# Patient Record
Sex: MALE | Race: WHITE | NOT HISPANIC OR LATINO | Employment: FULL TIME | URBAN - METROPOLITAN AREA
[De-identification: names, ages, dates, MRNs, and addresses within clinical notes are randomized per-mention and may not be internally consistent; named-entity substitution may affect disease eponyms.]

---

## 2017-10-10 ENCOUNTER — PATIENT MESSAGE (OUTPATIENT)
Dept: FAMILY MEDICINE | Facility: CLINIC | Age: 42
End: 2017-10-10

## 2017-10-10 DIAGNOSIS — Z00.00 ROUTINE MEDICAL EXAM: Primary | ICD-10-CM

## 2017-10-10 NOTE — TELEPHONE ENCOUNTER
"Fasting labs in, okay to schedule patient for his "physical" anytime and might be good to schedule him at 4 PM as an established patient and please make the appointment 40 minutes which will cover the 4:20 appointment slot as well and he can be the last the patient of the day    Contact him if he wants to since a later appointment may work better for his schedule  "

## 2017-10-24 ENCOUNTER — LAB VISIT (OUTPATIENT)
Dept: LAB | Facility: HOSPITAL | Age: 42
End: 2017-10-24
Attending: INTERNAL MEDICINE
Payer: COMMERCIAL

## 2017-10-24 DIAGNOSIS — Z00.00 ROUTINE MEDICAL EXAM: ICD-10-CM

## 2017-10-24 LAB
25(OH)D3+25(OH)D2 SERPL-MCNC: 24 NG/ML
ALBUMIN SERPL BCP-MCNC: 3.9 G/DL
ALP SERPL-CCNC: 66 U/L
ALT SERPL W/O P-5'-P-CCNC: 18 U/L
ANION GAP SERPL CALC-SCNC: 5 MMOL/L
AST SERPL-CCNC: 18 U/L
BASOPHILS # BLD AUTO: 0.04 K/UL
BASOPHILS NFR BLD: 0.7 %
BILIRUB SERPL-MCNC: 0.8 MG/DL
BUN SERPL-MCNC: 15 MG/DL
CALCIUM SERPL-MCNC: 9.3 MG/DL
CHLORIDE SERPL-SCNC: 106 MMOL/L
CHOLEST SERPL-MCNC: 186 MG/DL
CHOLEST/HDLC SERPL: 4.7 {RATIO}
CO2 SERPL-SCNC: 30 MMOL/L
CREAT SERPL-MCNC: 1 MG/DL
DIFFERENTIAL METHOD: NORMAL
EOSINOPHIL # BLD AUTO: 0.2 K/UL
EOSINOPHIL NFR BLD: 3.9 %
ERYTHROCYTE [DISTWIDTH] IN BLOOD BY AUTOMATED COUNT: 12.2 %
EST. GFR  (AFRICAN AMERICAN): >60 ML/MIN/1.73 M^2
EST. GFR  (NON AFRICAN AMERICAN): >60 ML/MIN/1.73 M^2
ESTIMATED AVG GLUCOSE: 94 MG/DL
GLUCOSE SERPL-MCNC: 86 MG/DL
HBA1C MFR BLD HPLC: 4.9 %
HCT VFR BLD AUTO: 41.1 %
HDLC SERPL-MCNC: 40 MG/DL
HDLC SERPL: 21.5 %
HGB BLD-MCNC: 14.3 G/DL
LDLC SERPL CALC-MCNC: 124.4 MG/DL
LYMPHOCYTES # BLD AUTO: 2 K/UL
LYMPHOCYTES NFR BLD: 35.4 %
MCH RBC QN AUTO: 29.5 PG
MCHC RBC AUTO-ENTMCNC: 34.8 G/DL
MCV RBC AUTO: 85 FL
MONOCYTES # BLD AUTO: 0.7 K/UL
MONOCYTES NFR BLD: 11.4 %
NEUTROPHILS # BLD AUTO: 2.8 K/UL
NEUTROPHILS NFR BLD: 48.6 %
NONHDLC SERPL-MCNC: 146 MG/DL
PLATELET # BLD AUTO: 188 K/UL
PMV BLD AUTO: 10 FL
POTASSIUM SERPL-SCNC: 4 MMOL/L
PROT SERPL-MCNC: 7.1 G/DL
RBC # BLD AUTO: 4.84 M/UL
SODIUM SERPL-SCNC: 141 MMOL/L
TRIGL SERPL-MCNC: 108 MG/DL
TSH SERPL DL<=0.005 MIU/L-ACNC: 1.56 UIU/ML
WBC # BLD AUTO: 5.71 K/UL

## 2017-10-24 PROCEDURE — 80061 LIPID PANEL: CPT

## 2017-10-24 PROCEDURE — 36415 COLL VENOUS BLD VENIPUNCTURE: CPT

## 2017-10-24 PROCEDURE — 80053 COMPREHEN METABOLIC PANEL: CPT

## 2017-10-24 PROCEDURE — 85025 COMPLETE CBC W/AUTO DIFF WBC: CPT

## 2017-10-24 PROCEDURE — 84443 ASSAY THYROID STIM HORMONE: CPT

## 2017-10-24 PROCEDURE — 82306 VITAMIN D 25 HYDROXY: CPT

## 2017-10-24 PROCEDURE — 83036 HEMOGLOBIN GLYCOSYLATED A1C: CPT

## 2017-11-02 ENCOUNTER — OFFICE VISIT (OUTPATIENT)
Dept: FAMILY MEDICINE | Facility: CLINIC | Age: 42
End: 2017-11-02
Payer: COMMERCIAL

## 2017-11-02 VITALS
OXYGEN SATURATION: 99 % | DIASTOLIC BLOOD PRESSURE: 80 MMHG | SYSTOLIC BLOOD PRESSURE: 120 MMHG | HEART RATE: 60 BPM | HEIGHT: 72 IN | BODY MASS INDEX: 24.16 KG/M2 | WEIGHT: 178.38 LBS | TEMPERATURE: 98 F

## 2017-11-02 DIAGNOSIS — Z00.00 ROUTINE MEDICAL EXAM: Primary | ICD-10-CM

## 2017-11-02 DIAGNOSIS — J30.2 SEASONAL ALLERGIC RHINITIS, UNSPECIFIED CHRONICITY, UNSPECIFIED TRIGGER: ICD-10-CM

## 2017-11-02 DIAGNOSIS — E78.6 HIGH-DENSITY LIPOPROTEIN DEFICIENCY: ICD-10-CM

## 2017-11-02 DIAGNOSIS — Z80.0 FAMILY HISTORY OF COLON CANCER: ICD-10-CM

## 2017-11-02 DIAGNOSIS — E55.9 VITAMIN D DEFICIENCY DISEASE: ICD-10-CM

## 2017-11-02 PROCEDURE — 99999 PR PBB SHADOW E&M-EST. PATIENT-LVL III: CPT | Mod: PBBFAC,,, | Performed by: INTERNAL MEDICINE

## 2017-11-02 PROCEDURE — 99396 PREV VISIT EST AGE 40-64: CPT | Mod: S$GLB,,, | Performed by: INTERNAL MEDICINE

## 2017-11-02 RX ORDER — ACETAMINOPHEN 500 MG
1 TABLET ORAL DAILY
COMMUNITY

## 2017-11-02 NOTE — PROGRESS NOTES
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  Exercises regularly without problems.  Does get some occasional palpitations and elevated heart rate  which response to vagal maneuvers.  His EKG done shows an incomplete right bundle branch block which was not there in 2011.  He has no cardiopulmonary symptoms.  He does have a strong family history of colon polyps we discussed possibly pursuing a colonoscopy at age 45 or so.  No first degree relatives with colon cancer better paternal grandfather had colon cancer at age 53 and an uncle with colon cancer.  His father and 2 uncles had colon polyps.  He has chronic ALLERGIES and sees Dr. Reed in ENT.  He has had nosebleeds with Flonase.  Labs reviewed revealing some vitamin D deficient and we discussed 2000 unit supplement which worked in the past and with the drop in vitamin D of supplement he has restarted.  He was not been able to exercise in the remote past secondary to an Achilles tendon injury and his HDL did drop to 38.      ROS:   CONST: weight stable. EYES: no vision change. ENT: no sore throat. CV: no chest pain w/ exertion. RESP: no shortness of breath. GI: no nausea, vomiting, diarrhea. No dysphagia. : no urinary issues. MUSCULOSKELETAL: no new myalgias or arthralgias. SKIN: no new changes. NEURO: no focal deficits. PSYCH: no new issues. ENDOCRINE: no polyuria. HEME: no lymph nodes. ALLERGY: no general pruritis.    Past medical history:  1.  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  Hearing loss secondary to prior ear infections  3.  Anaphylaxis to penicillin when treated for meningitis  4.  Possible SVT or other tachyarrhythmia that responds to vagal maneuvers  5.  Incomplete right bundle-branch block on EKG  6.  Vitamin D deficiency  7.  Low HDL at 38  8.  Achilles tendinitis  9.  Chronic ALLERGIES and history of sinusitis, Dr. Reed    Past  "surgical history:  1.  Tonsillectomy    Family history: Mother  at age 42 of breast cancer.  Father   of COPD exacerbation and had underlying heart disease by an angiogram.  Father had colon polyps and hypertension.  One sister with benign breast disease.  Colon polyps in father and an uncle and a paternal grandfather and a paternal uncle with colon cancer.      Social history: Rare alcohol at home maybe once or twice a week.  Never smoked.   with children.  Active in martial arts.  Works as a psychiatrist within our system next    Vital signs as above  Gen: no distress  EYES: conjunctiva clear, non-icteric, PERRL  ENT: nose clear, nasal mucosa normal, oropharynx clear and moist, teeth good  NECK:supple, thyroid non-palpable  RESP: effort is good, lungs clear  CV: heart RRR w/o murmur, gallops or rubs; no carotid bruits, no edema  GI: abdomen soft, non-distended, non-tender, no hepatosplenomegaly  MS: gait normal, no clubbing or cyanosis of the digits  SKIN: no rashes, warm to touch      Labs reviewed     Ramon was seen today for annual exam.    Diagnoses and all orders for this visit:    Routine medical exam, continue to exercise, periodic lab work, restart vitamin D, consider colonoscopy at age 45 given the fairly strong family history of polyps and colon cancer and he would be agreeable to that    Vitamin D deficiency disease    High-density lipoprotein deficiency    Family history of colon cancer    Seasonal allergic rhinitis, unspecified chronicity, unspecified trigger     "This note will not be shared with the patient."  "

## 2018-10-29 ENCOUNTER — PATIENT MESSAGE (OUTPATIENT)
Dept: FAMILY MEDICINE | Facility: CLINIC | Age: 43
End: 2018-10-29

## 2018-10-29 NOTE — TELEPHONE ENCOUNTER
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  Perhaps call over there and assess if they have it,adacel, or at the [facility] which might be on his way home etc.  He probably does not need to come all this way.

## 2018-10-30 ENCOUNTER — PATIENT MESSAGE (OUTPATIENT)
Dept: FAMILY MEDICINE | Facility: CLINIC | Age: 43
End: 2018-10-30

## 2018-11-27 ENCOUNTER — PATIENT MESSAGE (OUTPATIENT)
Dept: FAMILY MEDICINE | Facility: CLINIC | Age: 43
End: 2018-11-27

## 2018-11-27 DIAGNOSIS — Z00.00 ROUTINE MEDICAL EXAM: Primary | ICD-10-CM

## 2018-11-29 ENCOUNTER — PATIENT MESSAGE (OUTPATIENT)
Dept: FAMILY MEDICINE | Facility: CLINIC | Age: 43
End: 2018-11-29

## 2018-12-12 ENCOUNTER — LAB VISIT (OUTPATIENT)
Dept: LAB | Facility: HOSPITAL | Age: 43
End: 2018-12-12
Attending: INTERNAL MEDICINE
Payer: COMMERCIAL

## 2018-12-12 DIAGNOSIS — Z00.00 ROUTINE MEDICAL EXAM: ICD-10-CM

## 2018-12-12 LAB
25(OH)D3+25(OH)D2 SERPL-MCNC: 27 NG/ML
ALBUMIN SERPL BCP-MCNC: 4.3 G/DL
ALP SERPL-CCNC: 58 U/L
ALT SERPL W/O P-5'-P-CCNC: 26 U/L
ANION GAP SERPL CALC-SCNC: 5 MMOL/L
AST SERPL-CCNC: 20 U/L
BASOPHILS # BLD AUTO: 0.02 K/UL
BASOPHILS NFR BLD: 0.4 %
BILIRUB SERPL-MCNC: 0.6 MG/DL
BUN SERPL-MCNC: 18 MG/DL
CALCIUM SERPL-MCNC: 9.4 MG/DL
CHLORIDE SERPL-SCNC: 105 MMOL/L
CHOLEST SERPL-MCNC: 207 MG/DL
CHOLEST/HDLC SERPL: 5.2 {RATIO}
CO2 SERPL-SCNC: 30 MMOL/L
CREAT SERPL-MCNC: 1 MG/DL
DIFFERENTIAL METHOD: NORMAL
EOSINOPHIL # BLD AUTO: 0.1 K/UL
EOSINOPHIL NFR BLD: 2.2 %
ERYTHROCYTE [DISTWIDTH] IN BLOOD BY AUTOMATED COUNT: 12.6 %
EST. GFR  (AFRICAN AMERICAN): >60 ML/MIN/1.73 M^2
EST. GFR  (NON AFRICAN AMERICAN): >60 ML/MIN/1.73 M^2
ESTIMATED AVG GLUCOSE: 97 MG/DL
GLUCOSE SERPL-MCNC: 90 MG/DL
HBA1C MFR BLD HPLC: 5 %
HCT VFR BLD AUTO: 43 %
HDLC SERPL-MCNC: 40 MG/DL
HDLC SERPL: 19.3 %
HGB BLD-MCNC: 14.9 G/DL
LDLC SERPL CALC-MCNC: 144.4 MG/DL
LYMPHOCYTES # BLD AUTO: 1.9 K/UL
LYMPHOCYTES NFR BLD: 36.3 %
MCH RBC QN AUTO: 29.6 PG
MCHC RBC AUTO-ENTMCNC: 34.7 G/DL
MCV RBC AUTO: 85 FL
MONOCYTES # BLD AUTO: 0.6 K/UL
MONOCYTES NFR BLD: 11.2 %
NEUTROPHILS # BLD AUTO: 2.5 K/UL
NEUTROPHILS NFR BLD: 49.9 %
NONHDLC SERPL-MCNC: 167 MG/DL
PLATELET # BLD AUTO: 201 K/UL
PMV BLD AUTO: 10.6 FL
POTASSIUM SERPL-SCNC: 4.1 MMOL/L
PROT SERPL-MCNC: 7.3 G/DL
RBC # BLD AUTO: 5.04 M/UL
SODIUM SERPL-SCNC: 140 MMOL/L
TRIGL SERPL-MCNC: 113 MG/DL
TSH SERPL DL<=0.005 MIU/L-ACNC: 1.29 UIU/ML
WBC # BLD AUTO: 5.09 K/UL

## 2018-12-12 PROCEDURE — 83036 HEMOGLOBIN GLYCOSYLATED A1C: CPT

## 2018-12-12 PROCEDURE — 80061 LIPID PANEL: CPT

## 2018-12-12 PROCEDURE — 85025 COMPLETE CBC W/AUTO DIFF WBC: CPT

## 2018-12-12 PROCEDURE — 80053 COMPREHEN METABOLIC PANEL: CPT

## 2018-12-12 PROCEDURE — 36415 COLL VENOUS BLD VENIPUNCTURE: CPT

## 2018-12-12 PROCEDURE — 82306 VITAMIN D 25 HYDROXY: CPT

## 2018-12-12 PROCEDURE — 84443 ASSAY THYROID STIM HORMONE: CPT

## 2018-12-17 ENCOUNTER — OFFICE VISIT (OUTPATIENT)
Dept: FAMILY MEDICINE | Facility: CLINIC | Age: 43
End: 2018-12-17
Payer: COMMERCIAL

## 2018-12-17 VITALS
WEIGHT: 180.75 LBS | HEIGHT: 71 IN | SYSTOLIC BLOOD PRESSURE: 126 MMHG | OXYGEN SATURATION: 98 % | DIASTOLIC BLOOD PRESSURE: 80 MMHG | TEMPERATURE: 98 F | HEART RATE: 61 BPM | BODY MASS INDEX: 25.31 KG/M2

## 2018-12-17 DIAGNOSIS — Z00.00 ROUTINE MEDICAL EXAM: Primary | ICD-10-CM

## 2018-12-17 DIAGNOSIS — E55.9 VITAMIN D DEFICIENCY DISEASE: ICD-10-CM

## 2018-12-17 DIAGNOSIS — E78.6 HIGH-DENSITY LIPOPROTEIN DEFICIENCY: ICD-10-CM

## 2018-12-17 DIAGNOSIS — Z80.0 FAMILY HISTORY OF COLON CANCER: ICD-10-CM

## 2018-12-17 PROCEDURE — 99999 PR PBB SHADOW E&M-EST. PATIENT-LVL III: CPT | Mod: PBBFAC,,, | Performed by: INTERNAL MEDICINE

## 2018-12-17 PROCEDURE — 99396 PREV VISIT EST AGE 40-64: CPT | Mod: S$GLB,,, | Performed by: INTERNAL MEDICINE

## 2018-12-17 NOTE — PROGRESS NOTES
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  .  He has no cardiopulmonary symptoms.  He does have a strong family history of colon polyps we discussed possibly pursuing a colonoscopy at age 45 or so.  No first degree relatives with colon cancer better paternal grandfather had colon cancer at age 53 and an uncle with colon cancer.  His father and 2 uncles had colon polyps.  He has chronic ALLERGIES and sees Dr. Reed in ENT.  He has had nosebleeds with Flonase.  Labs reviewed revealing some vitamin D deficient and we discussed increasing vitamin-D supplement of 5000 units  He was not been able to exercise in the past few months due to his work schedule.  and his HDL did drop to 38 but now 40.      ROS:   CONST: weight stable. EYES: no vision change. ENT: no sore throat. CV: no chest pain w/ exertion. RESP: no shortness of breath. GI: no nausea, vomiting, diarrhea. No dysphagia. : no urinary issues. MUSCULOSKELETAL: no new myalgias or arthralgias. SKIN: no new changes. NEURO: no focal deficits. PSYCH: no new issues. ENDOCRINE: no polyuria. HEME: no lymph nodes. ALLERGY: no general pruritis.    Past medical history:  1.  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  Hearing loss secondary to prior ear infections  3.  Anaphylaxis to penicillin when treated for meningitis  4.  Possible SVT or other tachyarrhythmia that responds to vagal maneuvers  5.  Incomplete right bundle-branch block on EKG  6.  Vitamin D deficiency  7.  Low HDL at 38  8.  Achilles tendinitis  9.  Chronic ALLERGIES and history of sinusitis, Dr. Reed    Past surgical history:  1.  Tonsillectomy    Family history: Mother  at age 42 of breast cancer.  Father   of COPD exacerbation and had underlying heart disease by an angiogram.  Father had colon polyps and hypertension.  One sister with benign breast disease.  Colon polyps in father  "and an uncle and a paternal grandfather and a paternal uncle with colon cancer.      Social history: Rare alcohol at home maybe once or twice a week.  Never smoked.   with children.  Active in martial arts.  Works as a psychiatrist within our system next    Vital signs as above  Gen: no distress  EYES: conjunctiva clear, non-icteric, PERRL  ENT: nose clear, nasal mucosa normal, oropharynx clear and moist, teeth good  NECK:supple, thyroid non-palpable  RESP: effort is good, lungs clear  CV: heart RRR w/o murmur, gallops or rubs; no carotid bruits, no edema  GI: abdomen soft, non-distended, non-tender, no hepatosplenomegaly  MS: gait normal, no clubbing or cyanosis of the digits  SKIN: no rashes, warm to touch      Labs reviewed     Ramon was seen today for annual exam.        Routine medical exam, continue to exercise, periodic lab work, increase vitamin-D to 5000 units, planning for colonoscopy at age 45 given the fairly strong family history of polyps and colon cancer and he would be agreeable to that    "This note will not be shared with the patient."      "

## 2019-01-14 ENCOUNTER — OFFICE VISIT (OUTPATIENT)
Dept: FAMILY MEDICINE | Facility: CLINIC | Age: 44
End: 2019-01-14
Payer: COMMERCIAL

## 2019-01-14 DIAGNOSIS — R68.89 FLU-LIKE SYMPTOMS: ICD-10-CM

## 2019-01-14 DIAGNOSIS — J01.01 ACUTE RECURRENT MAXILLARY SINUSITIS: Primary | ICD-10-CM

## 2019-01-14 LAB
CTP QC/QA: YES
FLUAV AG NPH QL: NEGATIVE
FLUBV AG NPH QL: NEGATIVE

## 2019-01-14 PROCEDURE — 96372 THER/PROPH/DIAG INJ SC/IM: CPT | Mod: S$GLB,,, | Performed by: FAMILY MEDICINE

## 2019-01-14 PROCEDURE — 99214 OFFICE O/P EST MOD 30 MIN: CPT | Mod: 25,S$GLB,, | Performed by: FAMILY MEDICINE

## 2019-01-14 PROCEDURE — 96372 PR INJECTION,THERAP/PROPH/DIAG2ST, IM OR SUBCUT: ICD-10-PCS | Mod: S$GLB,,, | Performed by: FAMILY MEDICINE

## 2019-01-14 PROCEDURE — 99214 PR OFFICE/OUTPT VISIT, EST, LEVL IV, 30-39 MIN: ICD-10-PCS | Mod: 25,S$GLB,, | Performed by: FAMILY MEDICINE

## 2019-01-14 PROCEDURE — 87804 POCT INFLUENZA A/B: ICD-10-PCS | Mod: QW,S$GLB,, | Performed by: FAMILY MEDICINE

## 2019-01-14 PROCEDURE — 87804 INFLUENZA ASSAY W/OPTIC: CPT | Mod: QW,S$GLB,, | Performed by: FAMILY MEDICINE

## 2019-01-14 PROCEDURE — 99999 PR PBB SHADOW E&M-EST. PATIENT-LVL II: ICD-10-PCS | Mod: PBBFAC,,, | Performed by: FAMILY MEDICINE

## 2019-01-14 PROCEDURE — 99999 PR PBB SHADOW E&M-EST. PATIENT-LVL II: CPT | Mod: PBBFAC,,, | Performed by: FAMILY MEDICINE

## 2019-01-14 RX ORDER — DEXAMETHASONE SODIUM PHOSPHATE 4 MG/ML
8 INJECTION, SOLUTION INTRA-ARTICULAR; INTRALESIONAL; INTRAMUSCULAR; INTRAVENOUS; SOFT TISSUE
Status: COMPLETED | OUTPATIENT
Start: 2019-01-14 | End: 2019-01-14

## 2019-01-14 RX ORDER — LEVOFLOXACIN 750 MG/1
750 TABLET ORAL DAILY
Qty: 10 TABLET | Refills: 0 | Status: SHIPPED | OUTPATIENT
Start: 2019-01-14 | End: 2019-02-18

## 2019-01-14 RX ADMIN — DEXAMETHASONE SODIUM PHOSPHATE 8 MG: 4 INJECTION, SOLUTION INTRA-ARTICULAR; INTRALESIONAL; INTRAMUSCULAR; INTRAVENOUS; SOFT TISSUE at 12:01

## 2019-01-14 NOTE — PROGRESS NOTES
(12 PM) - Decadron was given IM in the right gluteus hola. Tolerated well. 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

## 2019-01-14 NOTE — PROGRESS NOTES
Routine Office Visit    Patient Name: Ramon Carlos    : 1975  MRN: [id number]    Subjective:  Ramon is a 43 y.o. male who presents today for:    1. Sinus pressure and fever  Patient presenting today with fevers, chills, and congestion as well as facial pain.  He states that symptoms began the week before juanito and he thought it was a cold.  Symptoms didn't improve, so he started a round of doxycycline.  There has been no improvement.  He has pain on palpation of his right face and states that his teeth and gums are sore.  There is a dry cough.  His wife has also been sick and is currently on her 3rd round of antibiotics.      Past Medical History  Past Medical History:   Diagnosis Date    Allergic rhinitis, seasonal     Calcaneus fracture     Family history of colon cancer     High-density lipoprotein deficiency     Incomplete right bundle branch block     Meningitis     Rotator cuff injury     Vitamin D deficiency disease        Past Surgical History  Past Surgical History:   Procedure Laterality Date    ADENOIDECTOMY      TONSILLECTOMY         Family History  Family History   Problem Relation Age of Onset    Cancer Mother         breast    Hyperlipidemia Father     Hypertension Father     Cancer Paternal Uncle         colon    Hyperlipidemia Maternal Grandmother     Hypertension Maternal Grandmother     Alcohol abuse Maternal Grandfather     Depression Paternal Grandmother     Cancer Paternal Grandfather         colon       Social History  Social History     Socioeconomic History    Marital status:      Spouse name: Not on file    Number of children: Not on file    Years of education: Not on file    Highest education level: Not on file   Social Needs    Financial resource strain: Not on file    Food insecurity - worry: Not on file    Food insecurity - inability: Not on file    Transportation needs - medical: Not on file    Transportation needs - non-medical:  Not on file   Occupational History    Not on file   Tobacco Use    Smoking status: Never Smoker    Smokeless tobacco: Never Used   Substance and Sexual Activity    Alcohol use: Yes     Alcohol/week: 0.0 oz     Comment: social    Drug use: No    Sexual activity: Not on file   Other Topics Concern    Not on file   Social History Narrative    Not on file       Current Medications  Current Outpatient Medications on File Prior to Visit   Medication Sig Dispense Refill    ascorbic acid (VITAMIN C) 100 MG tablet Take 1000 mg daily      ascorbic acid (VITAMIN C) 100 MG tablet Take 100 mg by mouth once daily.      azelastine (ASTELIN) 137 mcg (0.1 %) nasal spray 1 spray (137 mcg total) by Nasal route 2 (two) times daily. 30 mL 0    cholecalciferol, vitamin D3, (VITAMIN D3) 2,000 unit Cap Take 1 capsule by mouth once daily.      fexofenadine (ALLEGRA) 180 MG tablet Take 180 mg by mouth once daily.        fish oil-omega-3 fatty acids 300-1,000 mg capsule Take 2 g by mouth once daily.      multivitamin capsule Take 1 capsule by mouth once daily.       Current Facility-Administered Medications on File Prior to Visit   Medication Dose Route Frequency Provider Last Rate Last Dose    DIPH,PERTUSS(ACEL),TET VAC(PF)(ADULT)(ADACEL)(TDaP)  0.5 mL Intramuscular vaccine x 1 dose Bandar Michel MD           Allergies   Review of patient's allergies indicates:   Allergen Reactions    Codeine     Pcn [penicillins]     Sulfa (sulfonamide antibiotics)        Review of Systems (Pertinent positives)  Review of Systems   Constitutional: Positive for chills, fever and malaise/fatigue. Negative for weight loss.   HENT: Positive for congestion and sinus pain. Negative for ear pain and sore throat.    Eyes: Negative.    Respiratory: Positive for cough and hemoptysis. Negative for shortness of breath and wheezing.    Cardiovascular: Negative for chest pain.   Gastrointestinal: Negative for heartburn.   Musculoskeletal:  Positive for myalgias.   Skin: Negative for rash.   Neurological: Positive for headaches.   Endo/Heme/Allergies: Negative for environmental allergies.         There were no vitals taken for this visit.    GENERAL APPEARANCE: in no apparent distress and well developed and well nourished  HEENT: PERRLA EOMI, Sclera clear, anicteric, Oropharynx clear, no lesions, Neck supple with midline trachea; pain on palpation of or right maxillary sinus  NECK: normal, supple, no adenopathy, thyroid normal in size  RESPIRATORY: appears well, vitals normal, no respiratory distress, acyanotic, normal RR, chest clear, no wheezing, crepitations, rhonchi, normal symmetric air entry  HEART: regular rate and rhythm, S1, S2 normal, no murmur, click, rub or gallop.    ABDOMEN: abdomen is soft without tenderness, no masses, no hernias, no organomegaly, no rebound, no guarding. Suprapubic tenderness absent. No CVA tenderness.  SKIN: no rashes, no wounds, no other lesions  PSYCH: Alert, oriented x 3, thought content appropriate, speech normal, pleasant and cooperative, good eye contact, well groomed    Assessment/Plan:  Ramon Carlos is a 43 y.o. male who presents today for :    Diagnoses and all orders for this visit:    Acute recurrent maxillary sinusitis  -     levoFLOXacin (LEVAQUIN) 750 MG tablet; Take 1 tablet (750 mg total) by mouth once daily.  -     dexamethasone injection 8 mg    Flu-like symptoms  -     POCT Influenza A/B      1.  Rapid flu negative  2.  Failed doxycycline therapy and has severe PCN allergy, so will start on Levaquin  3.  Recommended he get CT sinuses if symptoms persist  4.  Decadron for symptom relief      Kyler Melara MD

## 2019-02-18 ENCOUNTER — OFFICE VISIT (OUTPATIENT)
Dept: ORTHOPEDICS | Facility: CLINIC | Age: 44
End: 2019-02-18
Payer: COMMERCIAL

## 2019-02-18 ENCOUNTER — TELEPHONE (OUTPATIENT)
Dept: SPORTS MEDICINE | Facility: CLINIC | Age: 44
End: 2019-02-18

## 2019-02-18 VITALS
DIASTOLIC BLOOD PRESSURE: 80 MMHG | HEIGHT: 71 IN | WEIGHT: 178.38 LBS | SYSTOLIC BLOOD PRESSURE: 122 MMHG | HEART RATE: 69 BPM | BODY MASS INDEX: 24.97 KG/M2

## 2019-02-18 DIAGNOSIS — M25.521 RIGHT ELBOW PAIN: Primary | ICD-10-CM

## 2019-02-18 PROCEDURE — 99999 PR PBB SHADOW E&M-EST. PATIENT-LVL III: CPT | Mod: PBBFAC,,, | Performed by: ORTHOPAEDIC SURGERY

## 2019-02-18 PROCEDURE — 99203 PR OFFICE/OUTPT VISIT, NEW, LEVL III, 30-44 MIN: ICD-10-PCS | Mod: S$GLB,,, | Performed by: ORTHOPAEDIC SURGERY

## 2019-02-18 PROCEDURE — 99203 OFFICE O/P NEW LOW 30 MIN: CPT | Mod: S$GLB,,, | Performed by: ORTHOPAEDIC SURGERY

## 2019-02-18 PROCEDURE — 99999 PR PBB SHADOW E&M-EST. PATIENT-LVL III: ICD-10-PCS | Mod: PBBFAC,,, | Performed by: ORTHOPAEDIC SURGERY

## 2019-02-18 PROCEDURE — 3008F PR BODY MASS INDEX (BMI) DOCUMENTED: ICD-10-PCS | Mod: CPTII,S$GLB,, | Performed by: ORTHOPAEDIC SURGERY

## 2019-02-18 PROCEDURE — 3008F BODY MASS INDEX DOCD: CPT | Mod: CPTII,S$GLB,, | Performed by: ORTHOPAEDIC SURGERY

## 2019-02-18 NOTE — PROGRESS NOTES
Chief Complaint   Patient presents with    Right Elbow - Injury, Swelling, Pain       This patient was seen in consultation at the request of No ref. provider found     HPI: Ramon Carlos is a 43 y.o. male who presents today complaining of right elbow pain   Duration of symptoms:  Occurred over the weekend   Recent course of Levaquin after failing doxycycline for URI -- mid January   Felt a pop when reaching behind his head to adjust a pillow while sitting on the couch this weekend. Has pain if he does an overhead tricep extension without resistance. Was sick with a GI bug over the weekend.  Trauma or new activity: no  Pain is intermittent  Aggravating factors: extension of elbow against gravity or resistance   Relieving factors:  Rest  Radicular symptoms: no numbness and paresthesias   Associated symptoms:  Mild swelling.  No ecchymosis  Prior treatment:  None   Pain does not interfere with duties at work.    This is the extent of the patient's complaints at this time.     Hand dominance: Right     Occupation:  Physician at [employer]    Review of Systems   All other systems reviewed and are negative.       Review of patient's allergies indicates:   Allergen Reactions    Codeine     Pcn [penicillins]     Sulfa (sulfonamide antibiotics)          Current Outpatient Medications:     ascorbic acid (VITAMIN C) 100 MG tablet, Take 1000 mg daily, Disp: , Rfl:     ascorbic acid (VITAMIN C) 100 MG tablet, Take 100 mg by mouth once daily., Disp: , Rfl:     cholecalciferol, vitamin D3, (VITAMIN D3) 2,000 unit Cap, Take 1 capsule by mouth once daily., Disp: , Rfl:     fexofenadine (ALLEGRA) 180 MG tablet, Take 180 mg by mouth once daily.  , Disp: , Rfl:     fish oil-omega-3 fatty acids 300-1,000 mg capsule, Take 2 g by mouth once daily., Disp: , Rfl:     multivitamin capsule, Take 1 capsule by mouth once daily., Disp: , Rfl:     azelastine (ASTELIN) 137 mcg (0.1 %) nasal spray, 1 spray (137 mcg total) by Nasal  route 2 (two) times daily., Disp: 30 mL, Rfl: 0    Current Facility-Administered Medications:     DIPH,PERTUSS(ACEL),TET VAC(PF)(ADULT)(ADACEL)(TDaP), 0.5 mL, Intramuscular, vaccine x 1 dose, Bandar Michel MD    Past Medical History:   Diagnosis Date    Allergic rhinitis, seasonal     Calcaneus fracture     Family history of colon cancer     High-density lipoprotein deficiency     Incomplete right bundle branch block     Meningitis     Rotator cuff injury     Vitamin D deficiency disease        Patient Active Problem List   Diagnosis    Vitamin D deficiency disease    High-density lipoprotein deficiency    Allergic rhinitis, seasonal    Family history of colon cancer    Incomplete right bundle branch block       Past Surgical History:   Procedure Laterality Date    ADENOIDECTOMY      TONSILLECTOMY         Social History     Tobacco Use    Smoking status: Never Smoker    Smokeless tobacco: Never Used   Substance Use Topics    Alcohol use: Yes     Alcohol/week: 0.0 oz     Comment: social    Drug use: No       Family History   Problem Relation Age of Onset    Cancer Mother         breast    Hyperlipidemia Father     Hypertension Father     Cancer Paternal Uncle         colon    Hyperlipidemia Maternal Grandmother     Hypertension Maternal Grandmother     Alcohol abuse Maternal Grandfather     Depression Paternal Grandmother     Cancer Paternal Grandfather         colon       Physical Exam:     Vitals:    02/18/19 1314   BP: 122/80   Pulse: 69           General: Weight: 80.9 kg (178 lb 5.6 oz) Body mass index is 24.88 kg/m².  Patient is alert, awake and oriented to time, place and person. Mood and affect are appropriate.  Patient does not appear to be in any distress, denies any constitutional symptoms and appears stated age.   HEENT: Pupils are equal and round, sclera are not injected. External examination of ears and nose reveals no abnormalities. Cranial nerves II-X are grossly  intact  Skin: no rashes, abrasions or open wounds on the affected extremity   Resp: No respiratory distress or audible wheezing   CV: 2+  pulses, all extremities warm and well perfused   Right Elbow   +defect over the elbow in area of triceps tendon. There are still triceps fibers intact  No ecchymosis or abrasions  Weakness with resisted extension of the elbow compared to the contralateral extremity  Full range of motion of the elbow  Light touch sensation intact m/ u /r  2+ RP  + EPL, IO, FDS, FDP    Imaging:  Three views of the right elbow show a triceps enthyseophyte with a possible fracture through the base -- is difficult to tell if this is acute or not     Assessment: 43 y.o. male with possible right triceps rupture    I explained my diagnostic impression and the reasoning behind it in detail, using layman's terms.  Models and/or pictures were used to help in the explanation.    Plan:   - MRI right elbow without contrast  - I will call him with the results of the MRI    All questions were answered in detail. The patient is in full agreement with the treatment plan and will proceed accordingly.    A note notifying No ref. provider found of my findings was sent via the electronic medical record     This note was created by combination of typed  and M-Modal dictation. Transcription and phonetic errors may be present.  If there are any questions, please contact me.

## 2019-02-18 NOTE — TELEPHONE ENCOUNTER
Spoke with patient to let him know the PA he had scheduled with does not treat elbow pain. Told him I can call him back this afternoon to see if another provider can see him this week

## 2019-02-19 ENCOUNTER — HOSPITAL ENCOUNTER (OUTPATIENT)
Dept: RADIOLOGY | Facility: HOSPITAL | Age: 44
Discharge: HOME | End: 2019-02-19
Attending: ORTHOPAEDIC SURGERY
Payer: COMMERCIAL

## 2019-02-19 DIAGNOSIS — M25.521 RIGHT ELBOW PAIN: ICD-10-CM

## 2019-02-19 PROCEDURE — 73221 MRI JOINT UPR EXTREM W/O DYE: CPT | Mod: TC,RT

## 2019-02-19 PROCEDURE — 73221 MRI JOINT UPR EXTREM W/O DYE: CPT | Mod: 26,RT,, | Performed by: RADIOLOGY

## 2019-02-19 PROCEDURE — 73221 MRI ELBOW WITHOUT CONTRAST RIGHT: ICD-10-PCS | Mod: 26,RT,, | Performed by: RADIOLOGY

## 2019-02-20 ENCOUNTER — PATIENT MESSAGE (OUTPATIENT)
Dept: ORTHOPEDICS | Facility: CLINIC | Age: 44
End: 2019-02-20

## 2019-02-20 ENCOUNTER — TELEPHONE (OUTPATIENT)
Dept: ORTHOPEDICS | Facility: CLINIC | Age: 44
End: 2019-02-20

## 2019-05-22 ENCOUNTER — OFFICE VISIT (OUTPATIENT)
Dept: FAMILY MEDICINE | Facility: CLINIC | Age: 44
End: 2019-05-22
Payer: COMMERCIAL

## 2019-05-22 VITALS
WEIGHT: 175.69 LBS | SYSTOLIC BLOOD PRESSURE: 120 MMHG | HEIGHT: 71 IN | TEMPERATURE: 98 F | RESPIRATION RATE: 17 BRPM | HEART RATE: 71 BPM | DIASTOLIC BLOOD PRESSURE: 84 MMHG | BODY MASS INDEX: 24.6 KG/M2 | OXYGEN SATURATION: 98 %

## 2019-05-22 DIAGNOSIS — R74.8 ELEVATED LIVER ENZYMES: Primary | ICD-10-CM

## 2019-05-22 DIAGNOSIS — R35.89 POLYURIA: Primary | ICD-10-CM

## 2019-05-22 DIAGNOSIS — N30.01 ACUTE CYSTITIS WITH HEMATURIA: Primary | ICD-10-CM

## 2019-05-22 DIAGNOSIS — R61 DIAPHORESIS: ICD-10-CM

## 2019-05-22 PROCEDURE — 99999 PR PBB SHADOW E&M-EST. PATIENT-LVL III: CPT | Mod: PBBFAC,,, | Performed by: FAMILY MEDICINE

## 2019-05-22 PROCEDURE — 99999 PR PBB SHADOW E&M-EST. PATIENT-LVL III: ICD-10-PCS | Mod: PBBFAC,,, | Performed by: FAMILY MEDICINE

## 2019-05-22 PROCEDURE — 99214 PR OFFICE/OUTPT VISIT, EST, LEVL IV, 30-39 MIN: ICD-10-PCS | Mod: S$GLB,,, | Performed by: FAMILY MEDICINE

## 2019-05-22 PROCEDURE — 3008F PR BODY MASS INDEX (BMI) DOCUMENTED: ICD-10-PCS | Mod: CPTII,S$GLB,, | Performed by: FAMILY MEDICINE

## 2019-05-22 PROCEDURE — 3008F BODY MASS INDEX DOCD: CPT | Mod: CPTII,S$GLB,, | Performed by: FAMILY MEDICINE

## 2019-05-22 PROCEDURE — 99214 OFFICE O/P EST MOD 30 MIN: CPT | Mod: S$GLB,,, | Performed by: FAMILY MEDICINE

## 2019-05-22 RX ORDER — DOXYCYCLINE 100 MG/1
100 CAPSULE ORAL EVERY 12 HOURS
Qty: 20 CAPSULE | Refills: 0 | Status: SHIPPED | OUTPATIENT
Start: 2019-05-22 | End: 2019-05-23

## 2019-05-22 RX ORDER — CIPROFLOXACIN 500 MG/1
500 TABLET ORAL EVERY 12 HOURS
Qty: 14 TABLET | Refills: 0 | Status: SHIPPED | OUTPATIENT
Start: 2019-05-22 | End: 2019-05-29

## 2019-05-23 ENCOUNTER — OFFICE VISIT (OUTPATIENT)
Dept: UROLOGY | Facility: CLINIC | Age: 44
End: 2019-05-23
Payer: COMMERCIAL

## 2019-05-23 ENCOUNTER — LAB VISIT (OUTPATIENT)
Dept: LAB | Facility: HOSPITAL | Age: 44
End: 2019-05-23
Attending: FAMILY MEDICINE
Payer: COMMERCIAL

## 2019-05-23 VITALS
WEIGHT: 177.56 LBS | BODY MASS INDEX: 24.86 KG/M2 | SYSTOLIC BLOOD PRESSURE: 120 MMHG | HEIGHT: 71 IN | DIASTOLIC BLOOD PRESSURE: 78 MMHG

## 2019-05-23 DIAGNOSIS — R97.20 ELEVATED PSA: ICD-10-CM

## 2019-05-23 DIAGNOSIS — R31.0 HEMATURIA, GROSS: Primary | ICD-10-CM

## 2019-05-23 DIAGNOSIS — N30.01 ACUTE CYSTITIS WITH HEMATURIA: ICD-10-CM

## 2019-05-23 DIAGNOSIS — R74.8 ELEVATED LIVER ENZYMES: ICD-10-CM

## 2019-05-23 LAB
ALBUMIN SERPL BCP-MCNC: 3.6 G/DL (ref 3.5–5.2)
ALP SERPL-CCNC: 88 U/L (ref 55–135)
ALT SERPL W/O P-5'-P-CCNC: 93 U/L (ref 10–44)
ANION GAP SERPL CALC-SCNC: 7 MMOL/L (ref 8–16)
AST SERPL-CCNC: 45 U/L (ref 10–40)
BILIRUB SERPL-MCNC: 0.3 MG/DL (ref 0.1–1)
BUN SERPL-MCNC: 12 MG/DL (ref 6–20)
CALCIUM SERPL-MCNC: 9.4 MG/DL (ref 8.7–10.5)
CHLORIDE SERPL-SCNC: 102 MMOL/L (ref 95–110)
CO2 SERPL-SCNC: 30 MMOL/L (ref 23–29)
CREAT SERPL-MCNC: 1 MG/DL (ref 0.5–1.4)
EST. GFR  (AFRICAN AMERICAN): >60 ML/MIN/1.73 M^2
EST. GFR  (NON AFRICAN AMERICAN): >60 ML/MIN/1.73 M^2
GGT SERPL-CCNC: 119 U/L (ref 8–55)
GLUCOSE SERPL-MCNC: 115 MG/DL (ref 70–110)
POTASSIUM SERPL-SCNC: 3.8 MMOL/L (ref 3.5–5.1)
PROT SERPL-MCNC: 7.3 G/DL (ref 6–8.4)
SODIUM SERPL-SCNC: 139 MMOL/L (ref 136–145)

## 2019-05-23 PROCEDURE — 99999 PR PBB SHADOW E&M-EST. PATIENT-LVL III: ICD-10-PCS | Mod: PBBFAC,,, | Performed by: UROLOGY

## 2019-05-23 PROCEDURE — 99999 PR PBB SHADOW E&M-EST. PATIENT-LVL III: CPT | Mod: PBBFAC,,, | Performed by: UROLOGY

## 2019-05-23 PROCEDURE — 36415 COLL VENOUS BLD VENIPUNCTURE: CPT | Mod: PN

## 2019-05-23 PROCEDURE — 82977 ASSAY OF GGT: CPT

## 2019-05-23 PROCEDURE — 99244 PR OFFICE CONSULTATION,LEVEL IV: ICD-10-PCS | Mod: S$GLB,,, | Performed by: UROLOGY

## 2019-05-23 PROCEDURE — 99244 OFF/OP CNSLTJ NEW/EST MOD 40: CPT | Mod: S$GLB,,, | Performed by: UROLOGY

## 2019-05-23 PROCEDURE — 80053 COMPREHEN METABOLIC PANEL: CPT

## 2019-05-23 NOTE — PROGRESS NOTES
"Subjective:       Ramon Carlos is a 43 y.o. male who is a new patient who was referred by Dr Melara for evaluation of hematuria, UTI, elevated PSA.      He was seen by PCP with c/o frequency, low volume voids, sensation of CARLEE, fatigue. No prior  history. Dark urine noted. Fever, chills, night sweats several days ago. Mild constipation over weekend. UCx with E coli - started on doxy yesterday, changed to Cipro x 30d. He has h/o bursitis while being on Levaquin in the past. Denies perineal pain.     LFTs also elevated. Seeing PCP. CMP repeated today, pending.     PVR (bladder scan) today - 0cc    Urine "red" though UA micro with 0 RBCs. +many bacteria/WBC.  UCx preliminary positive for E coli (50-99k), sens pending.     PSA 5/19 - 41.2. No prior PSAs. No family history of PCa.       The following portions of the patient's history were reviewed and updated as appropriate: allergies, current medications, past family history, past medical history, past social history, past surgical history and problem list.    Review of Systems  Constitutional: no fever or chills  ENT: no nasal congestion or sore throat  Respiratory: no cough or shortness of breath  Cardiovascular: no chest pain or palpitations  Gastrointestinal: no nausea or vomiting, tolerating diet  Genitourinary: as per HPI  Hematologic/Lymphatic: no easy bruising or lymphadenopathy  Musculoskeletal: no arthralgias or myalgias  Skin: no rashes or lesions  Neurological: no seizures or tremors  Behavioral/Psych: no auditory or visual hallucinations       Objective:    Vitals: /78   Ht 5' 10.9" (1.801 m)   Wt 80.6 kg (177 lb 9.3 oz)   BMI 24.84 kg/m²     Physical Exam   General: well developed, well nourished in no acute distress  Head: normocephalic, atraumatic  Neck: supple, trachea midline, no obvious enlargement of thyroid  HEENT: EOMI, mucus membranes moist, sclera anicteric, no hearing impairment  Lungs: symmetric expansion, non-labored " breathing  Cardiovascular: regular rate and rhythm, normal pulses  Abdomen: soft, non tender, non distended, no palpable masses, no hepatosplenomegaly, no hernias, bladder nonpalpable. No CVA tenderness.  Musculoskeletal: no peripheral edema, normal ROM in bilateral upper and lower extremities  Lymphatics: no cervical or inguinal lymphadenopathy  Skin: no rashes or lesions  Neuro: alert and oriented x 3, no gross deficits  Psych: normal judgment and insight, normal mood/affect and non-anxious  Genitourinary:    patient declined exam   BERNARDA: patient declined exam      Lab Review   Urine analysis today in clinic shows +blood 5-10     Lab Results   Component Value Date    WBC 2.75 (L) 05/22/2019    HGB 14.6 05/22/2019    HCT 43.2 05/22/2019    MCV 87 05/22/2019     05/22/2019     Lab Results   Component Value Date    CREATININE 1.0 05/22/2019    BUN 14 05/22/2019     Lab Results   Component Value Date    PSA 41.2 (H) 05/22/2019     No results found for: PSADIAG    Imaging  NA       Assessment/Plan:      1. Possible gross hematuria    - Urine red but 0 RBCs on micro. ?Rhabdo - no strong risk factors.   - UCx - E coli, sens pending   - Cytology - not indicated   - CT urogram   - Office cystoscopy - will obtain results from cultures and scans first. Not necessary at this time.      2. Acute cystitis with hematuria    - E coli UTI vs prostatitis   - Started on doxy yesterday, changed to Cipro. Await sensitivities to adjust.   - Discussed SE with Cipro. Allergies limiting other options.   - CT uro to evaluate upper tracts   - Consider cysto     3. Elevated PSA    - Suspect due to UTI/prostatitis   - Recheck in ~6 weeks       Follow up pending CT scan

## 2019-05-23 NOTE — LETTER
May 23, 2019      Kyler Melara MD  [address]  [place] [address]           [facility] - Urology  [address] [facility] [address]  [place] [address]  Phone: [phone]  Fax: [phone]          Patient: Ramon Carlos   MR Number: [id number]   YOB: 1975   Date of Visit: 5/23/2019       Dear Dr. Kyler DACOSTA Page:    Thank you for referring Ramon Carlos to me for evaluation. Attached you will find relevant portions of my assessment and plan of care.    If you have questions, please do not hesitate to call me. I look forward to following Ramon Carlos along with you.    Sincerely,    Sheila Duran MD    Enclosure  CC:  No Recipients    If you would like to receive this communication electronically, please contact [email or web address] or [phone] to request more information on [employer] Link access.    For providers and/or their staff who would like to refer a patient to [facility], please contact us through our one-stop-shop provider referral line, [facility], at [phone].    If you feel you have received this communication in error or would no longer like to receive these types of communications, please e-mail [email or web address]

## 2019-05-27 ENCOUNTER — PATIENT MESSAGE (OUTPATIENT)
Dept: UROLOGY | Facility: CLINIC | Age: 44
End: 2019-05-27

## 2019-05-27 ENCOUNTER — HOSPITAL ENCOUNTER (OUTPATIENT)
Dept: RADIOLOGY | Facility: HOSPITAL | Age: 44
Discharge: HOME | End: 2019-05-27
Attending: UROLOGY
Payer: COMMERCIAL

## 2019-05-27 DIAGNOSIS — N30.01 ACUTE CYSTITIS WITH HEMATURIA: ICD-10-CM

## 2019-05-27 PROCEDURE — 25500020 PHARM REV CODE 255: Performed by: UROLOGY

## 2019-05-27 PROCEDURE — 74178 CT ABD&PLV WO CNTR FLWD CNTR: CPT | Mod: TC

## 2019-05-27 PROCEDURE — 74178 CT UROGRAM ABD PELVIS W WO: ICD-10-PCS | Mod: 26,,, | Performed by: RADIOLOGY

## 2019-05-27 PROCEDURE — 74178 CT ABD&PLV WO CNTR FLWD CNTR: CPT | Mod: 26,,, | Performed by: RADIOLOGY

## 2019-05-27 RX ADMIN — IOHEXOL 125 ML: 350 INJECTION, SOLUTION INTRAVENOUS at 02:05

## 2019-05-28 ENCOUNTER — PATIENT MESSAGE (OUTPATIENT)
Dept: UROLOGY | Facility: CLINIC | Age: 44
End: 2019-05-28

## 2019-05-28 RX ORDER — CIPROFLOXACIN 500 MG/1
500 TABLET ORAL 2 TIMES DAILY
Qty: 42 TABLET | Refills: 0 | Status: SHIPPED | OUTPATIENT
Start: 2019-05-28 | End: 2019-06-18

## 2019-11-18 ENCOUNTER — PATIENT MESSAGE (OUTPATIENT)
Dept: FAMILY MEDICINE | Facility: CLINIC | Age: 44
End: 2019-11-18

## 2019-11-18 ENCOUNTER — PATIENT MESSAGE (OUTPATIENT)
Dept: UROLOGY | Facility: CLINIC | Age: 44
End: 2019-11-18

## 2019-11-18 DIAGNOSIS — R97.20 ELEVATED PSA: Primary | ICD-10-CM

## 2019-11-21 ENCOUNTER — LAB VISIT (OUTPATIENT)
Dept: LAB | Facility: HOSPITAL | Age: 44
End: 2019-11-21
Attending: UROLOGY
Payer: COMMERCIAL

## 2019-11-21 DIAGNOSIS — R97.20 ELEVATED PSA: ICD-10-CM

## 2019-11-21 PROCEDURE — 36415 COLL VENOUS BLD VENIPUNCTURE: CPT

## 2019-11-21 PROCEDURE — 84153 ASSAY OF PSA TOTAL: CPT

## 2019-11-22 ENCOUNTER — PATIENT MESSAGE (OUTPATIENT)
Dept: UROLOGY | Facility: CLINIC | Age: 44
End: 2019-11-22

## 2019-11-22 DIAGNOSIS — R97.20 ELEVATED PSA: Primary | ICD-10-CM

## 2019-11-22 LAB — COMPLEXED PSA SERPL-MCNC: 4 NG/ML (ref 0–4)

## 2019-12-13 ENCOUNTER — OFFICE VISIT (OUTPATIENT)
Dept: FAMILY MEDICINE | Facility: CLINIC | Age: 44
End: 2019-12-13
Payer: COMMERCIAL

## 2019-12-13 ENCOUNTER — LAB VISIT (OUTPATIENT)
Dept: LAB | Facility: HOSPITAL | Age: 44
End: 2019-12-13
Attending: INTERNAL MEDICINE
Payer: COMMERCIAL

## 2019-12-13 VITALS
DIASTOLIC BLOOD PRESSURE: 80 MMHG | BODY MASS INDEX: 25.25 KG/M2 | HEART RATE: 54 BPM | WEIGHT: 176.38 LBS | TEMPERATURE: 98 F | SYSTOLIC BLOOD PRESSURE: 130 MMHG | OXYGEN SATURATION: 98 % | HEIGHT: 70 IN

## 2019-12-13 DIAGNOSIS — R97.20 ELEVATED PSA: ICD-10-CM

## 2019-12-13 DIAGNOSIS — E55.9 VITAMIN D DEFICIENCY DISEASE: ICD-10-CM

## 2019-12-13 DIAGNOSIS — Z00.00 ROUTINE MEDICAL EXAM: ICD-10-CM

## 2019-12-13 DIAGNOSIS — Z80.0 FAMILY HISTORY OF COLON CANCER: ICD-10-CM

## 2019-12-13 DIAGNOSIS — R74.8 ELEVATED LIVER ENZYMES: ICD-10-CM

## 2019-12-13 DIAGNOSIS — E78.6 HIGH-DENSITY LIPOPROTEIN DEFICIENCY: ICD-10-CM

## 2019-12-13 DIAGNOSIS — J30.2 SEASONAL ALLERGIC RHINITIS, UNSPECIFIED TRIGGER: ICD-10-CM

## 2019-12-13 DIAGNOSIS — Z00.00 ROUTINE MEDICAL EXAM: Primary | ICD-10-CM

## 2019-12-13 PROCEDURE — 82550 ASSAY OF CK (CPK): CPT

## 2019-12-13 PROCEDURE — 36415 COLL VENOUS BLD VENIPUNCTURE: CPT | Mod: PO

## 2019-12-13 PROCEDURE — 84439 ASSAY OF FREE THYROXINE: CPT

## 2019-12-13 PROCEDURE — 80061 LIPID PANEL: CPT

## 2019-12-13 PROCEDURE — 85025 COMPLETE CBC W/AUTO DIFF WBC: CPT

## 2019-12-13 PROCEDURE — 99396 PR PREVENTIVE VISIT,EST,40-64: ICD-10-PCS | Mod: S$GLB,,, | Performed by: INTERNAL MEDICINE

## 2019-12-13 PROCEDURE — 99999 PR PBB SHADOW E&M-EST. PATIENT-LVL III: CPT | Mod: PBBFAC,,, | Performed by: INTERNAL MEDICINE

## 2019-12-13 PROCEDURE — 99999 PR PBB SHADOW E&M-EST. PATIENT-LVL III: ICD-10-PCS | Mod: PBBFAC,,, | Performed by: INTERNAL MEDICINE

## 2019-12-13 PROCEDURE — 80053 COMPREHEN METABOLIC PANEL: CPT

## 2019-12-13 PROCEDURE — 82306 VITAMIN D 25 HYDROXY: CPT

## 2019-12-13 PROCEDURE — 99396 PREV VISIT EST AGE 40-64: CPT | Mod: S$GLB,,, | Performed by: INTERNAL MEDICINE

## 2019-12-13 PROCEDURE — 84443 ASSAY THYROID STIM HORMONE: CPT

## 2019-12-13 NOTE — PROGRESS NOTES
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  .  He has no cardiopulmonary symptoms.  He does have a strong family history of colon polyps we discussed possibly pursuing a colonoscopy at age 45 or so.  No first degree relatives with colon cancer better paternal grandfather had colon cancer at age 53 and an uncle with colon cancer.  His father and 2 uncles had colon polyps.     Reviewed the history that in May of 2019 he likely had significant prostatitis.  He has some AST and ALT elevation.  He may well have had some rhabdo at the time based upon the urinalysis.  He has no further urinary problems.  He took several weeks of Cipro without problems.  His PSA did decrease down to 4.0 and has a follow-up PSA plan with Urology.  He may well need repeat assessment for prostatitis, eventual biopsy and so forth and he is well aware of that and keeping in touch with the urologist.    Knee problems or better having discontinue doing martial arts.  He continues on vitamin-D and we will reassess the vitamin-D deficiency.      He has had an HDL deficiency in the past that may or may not be influenced by his exercise and we will reassess obviously not exercising as much at this time.    Does have some external hemorrhoids but no bleeding.  We discussed fiber.  He was planning to have a colonoscopy at age 45 and if hemorrhoids are still a problem we discussed seeing colon and rectal surgery 1st and they may well be able to treat internal hemorrhoids and so forth during the colonoscopy if so indicated.    ROS:   CONST: weight stable. EYES: no vision change. ENT: no sore throat. CV: no chest pain w/ exertion. RESP: no shortness of breath. GI: no nausea, vomiting, diarrhea. No dysphagia. : no urinary issues. MUSCULOSKELETAL: no new myalgias or arthralgias. SKIN: no new changes. NEURO: no focal deficits. PSYCH: no new  issues. ENDOCRINE: no polyuria. HEME: no lymph nodes. ALLERGY: no general pruritis.    Past medical history:  1.  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  Hearing loss secondary to prior ear infections  3.  Anaphylaxis to penicillin when treated for meningitis  4.  Possible SVT or other tachyarrhythmia that responds to vagal maneuvers  5.  Incomplete right bundle-branch block on EKG  6.  Vitamin D deficiency  7.  Low HDL at 38  8.  Achilles tendinitis  9.  Chronic ALLERGIES and history of sinusitis, Dr. Reed  10.  Clinical prostatitis and jose juan PSA   -seen Roger    Past surgical history:  1.  Tonsillectomy    Family history: Mother  at age 42 of breast cancer.  Father   of COPD exacerbation and had underlying heart disease by an angiogram.  Father had colon polyps and hypertension.  One sister with benign breast disease.  Colon polyps in father and an uncle and a paternal grandfather and a paternal uncle with colon cancer.      PSA 41 to 4    Social history: Rare alcohol at home maybe once or twice a week.  Never smoked.   with children.  Active in martial arts.  Works as a psychiatrist within our system next    Vital signs as above  Gen: no distress  EYES: conjunctiva clear, non-icteric, PERRL  ENT: nose clear, nasal mucosa normal, oropharynx clear and moist, teeth good  NECK:supple, thyroid non-palpable  RESP: effort is good, lungs clear  CV: heart RRR w/o murmur, gallops or rubs; no carotid bruits, no edema  GI: abdomen soft, non-distended, non-tender, no hepatosplenomegaly  MS: gait normal, no clubbing or cyanosis of the digits  SKIN: no rashes, warm to touch      Labs reviewed     Ramon was seen today for annual exam.        Routine medical exam, continue to exercise, periodic lab work, increase vitamin-D to 5000 units, planning for colonoscopy at age 45 given the fairly strong family history of polyps and colon cancer and he would be agreeable to that      Raomn was seen today for annual  "exam.    Diagnoses and all orders for this visit:    Routine medical exam, colonoscopy planned for age 45, keep follow-up with Urology to work out the elevated PSA issue.  -     Comprehensive metabolic panel; Future  -     CK; Future  -     TSH; Future  -     T4, free; Future  -     CBC auto differential; Future  -     Lipid panel; Future  -     Urinalysis; Future    Vitamin D deficiency disease, reassess on supplement  -     Vitamin D; Future    High-density lipoprotein deficiency, reassess    Family history of colon cancer    Seasonal allergic rhinitis, unspecified trigger, chronic and stable    Elevated PSA    Elevated liver enzymes, may well have been secondary to elevated muscle inflammation, possibly some rhabdomyolysis secondary to the illness at that time, fever, sweating and so forth.  Reassessed.  Check CK., repeat urinalysis                               Access may not be therapeutic given the discussion of the differential above including prostate cancer regarding the elevated PSA"This note will not be shared with the patient."    "

## 2019-12-14 LAB
25(OH)D3+25(OH)D2 SERPL-MCNC: 43 NG/ML (ref 30–96)
ALBUMIN SERPL BCP-MCNC: 4.5 G/DL (ref 3.5–5.2)
ALP SERPL-CCNC: 60 U/L (ref 55–135)
ALT SERPL W/O P-5'-P-CCNC: 24 U/L (ref 10–44)
ANION GAP SERPL CALC-SCNC: 10 MMOL/L (ref 8–16)
ANISOCYTOSIS BLD QL SMEAR: SLIGHT
AST SERPL-CCNC: 21 U/L (ref 10–40)
BASOPHILS # BLD AUTO: 0.05 K/UL (ref 0–0.2)
BASOPHILS NFR BLD: 0.8 % (ref 0–1.9)
BILIRUB SERPL-MCNC: 0.8 MG/DL (ref 0.1–1)
BUN SERPL-MCNC: 15 MG/DL (ref 6–20)
CALCIUM SERPL-MCNC: 9.8 MG/DL (ref 8.7–10.5)
CHLORIDE SERPL-SCNC: 102 MMOL/L (ref 95–110)
CHOLEST SERPL-MCNC: 223 MG/DL (ref 120–199)
CHOLEST/HDLC SERPL: 5.2 {RATIO} (ref 2–5)
CK SERPL-CCNC: 45 U/L (ref 20–200)
CO2 SERPL-SCNC: 27 MMOL/L (ref 23–29)
CREAT SERPL-MCNC: 1 MG/DL (ref 0.5–1.4)
DIFFERENTIAL METHOD: ABNORMAL
EOSINOPHIL # BLD AUTO: 0.1 K/UL (ref 0–0.5)
EOSINOPHIL NFR BLD: 1.5 % (ref 0–8)
ERYTHROCYTE [DISTWIDTH] IN BLOOD BY AUTOMATED COUNT: 12.2 % (ref 11.5–14.5)
EST. GFR  (AFRICAN AMERICAN): >60 ML/MIN/1.73 M^2
EST. GFR  (NON AFRICAN AMERICAN): >60 ML/MIN/1.73 M^2
GLUCOSE SERPL-MCNC: 89 MG/DL (ref 70–110)
HCT VFR BLD AUTO: 46.1 % (ref 40–54)
HDLC SERPL-MCNC: 43 MG/DL (ref 40–75)
HDLC SERPL: 19.3 % (ref 20–50)
HGB BLD-MCNC: 15.2 G/DL (ref 14–18)
HYPOCHROMIA BLD QL SMEAR: ABNORMAL
IMM GRANULOCYTES # BLD AUTO: 0.01 K/UL (ref 0–0.04)
IMM GRANULOCYTES NFR BLD AUTO: 0.2 % (ref 0–0.5)
LDLC SERPL CALC-MCNC: 162.6 MG/DL (ref 63–159)
LYMPHOCYTES # BLD AUTO: 2.6 K/UL (ref 1–4.8)
LYMPHOCYTES NFR BLD: 39.9 % (ref 18–48)
MCH RBC QN AUTO: 29.1 PG (ref 27–31)
MCHC RBC AUTO-ENTMCNC: 33 G/DL (ref 32–36)
MCV RBC AUTO: 88 FL (ref 82–98)
MONOCYTES # BLD AUTO: 0.7 K/UL (ref 0.3–1)
MONOCYTES NFR BLD: 10 % (ref 4–15)
NEUTROPHILS # BLD AUTO: 3.1 K/UL (ref 1.8–7.7)
NEUTROPHILS NFR BLD: 47.6 % (ref 38–73)
NONHDLC SERPL-MCNC: 180 MG/DL
NRBC BLD-RTO: 0 /100 WBC
PLATELET # BLD AUTO: 146 K/UL (ref 150–350)
PLATELET BLD QL SMEAR: ABNORMAL
PMV BLD AUTO: 12.9 FL (ref 9.2–12.9)
POTASSIUM SERPL-SCNC: 4.1 MMOL/L (ref 3.5–5.1)
PROT SERPL-MCNC: 7.6 G/DL (ref 6–8.4)
RBC # BLD AUTO: 5.22 M/UL (ref 4.6–6.2)
SODIUM SERPL-SCNC: 139 MMOL/L (ref 136–145)
T4 FREE SERPL-MCNC: 1.4 NG/DL (ref 0.71–1.51)
TRIGL SERPL-MCNC: 87 MG/DL (ref 30–150)
TSH SERPL DL<=0.005 MIU/L-ACNC: 0.84 UIU/ML (ref 0.4–4)
WBC # BLD AUTO: 6.51 K/UL (ref 3.9–12.7)

## 2020-03-06 ENCOUNTER — LAB VISIT (OUTPATIENT)
Dept: LAB | Facility: HOSPITAL | Age: 45
End: 2020-03-06
Attending: UROLOGY
Payer: COMMERCIAL

## 2020-03-06 DIAGNOSIS — R97.20 ELEVATED PSA: ICD-10-CM

## 2020-03-06 PROCEDURE — 84154 ASSAY OF PSA FREE: CPT

## 2020-03-06 PROCEDURE — 36415 COLL VENOUS BLD VENIPUNCTURE: CPT

## 2020-03-09 ENCOUNTER — TELEPHONE (OUTPATIENT)
Dept: UROLOGY | Facility: CLINIC | Age: 45
End: 2020-03-09

## 2020-03-09 DIAGNOSIS — R97.20 ELEVATED PSA: Primary | ICD-10-CM

## 2020-03-09 LAB
PROSTATE SPECIFIC ANTIGEN, TOTAL: 1.5 NG/ML (ref 0–4)
PSA FREE MFR SERPL: 24 %
PSA FREE SERPL-MCNC: 0.36 NG/ML (ref 0.01–1.5)

## 2020-03-16 ENCOUNTER — OFFICE VISIT (OUTPATIENT)
Dept: OTOLARYNGOLOGY | Facility: CLINIC | Age: 45
End: 2020-03-16
Payer: COMMERCIAL

## 2020-03-16 ENCOUNTER — CLINICAL SUPPORT (OUTPATIENT)
Dept: AUDIOLOGY | Facility: CLINIC | Age: 45
End: 2020-03-16
Payer: COMMERCIAL

## 2020-03-16 VITALS
WEIGHT: 176 LBS | DIASTOLIC BLOOD PRESSURE: 70 MMHG | SYSTOLIC BLOOD PRESSURE: 122 MMHG | HEIGHT: 70 IN | BODY MASS INDEX: 25.2 KG/M2

## 2020-03-16 DIAGNOSIS — J30.1 SEASONAL ALLERGIC RHINITIS DUE TO POLLEN: ICD-10-CM

## 2020-03-16 DIAGNOSIS — H90.A32 MIXED CONDUCTIVE AND SENSORINEURAL HEARING LOSS OF LEFT EAR WITH RESTRICTED HEARING OF RIGHT EAR: Primary | ICD-10-CM

## 2020-03-16 PROCEDURE — 99203 PR OFFICE/OUTPT VISIT, NEW, LEVL III, 30-44 MIN: ICD-10-PCS | Mod: S$GLB,,, | Performed by: OTOLARYNGOLOGY

## 2020-03-16 PROCEDURE — 3008F BODY MASS INDEX DOCD: CPT | Mod: CPTII,S$GLB,, | Performed by: OTOLARYNGOLOGY

## 2020-03-16 PROCEDURE — 92557 COMPREHENSIVE HEARING TEST: CPT | Mod: S$GLB,,, | Performed by: AUDIOLOGIST

## 2020-03-16 PROCEDURE — 92557 PR COMPREHENSIVE HEARING TEST: ICD-10-PCS | Mod: S$GLB,,, | Performed by: AUDIOLOGIST

## 2020-03-16 PROCEDURE — 99203 OFFICE O/P NEW LOW 30 MIN: CPT | Mod: S$GLB,,, | Performed by: OTOLARYNGOLOGY

## 2020-03-16 PROCEDURE — 92550 PR TYMPANOMETRY AND REFLEX THRESHOLD MEASUREMENTS: ICD-10-PCS | Mod: S$GLB,,, | Performed by: AUDIOLOGIST

## 2020-03-16 PROCEDURE — 92550 TYMPANOMETRY & REFLEX THRESH: CPT | Mod: S$GLB,,, | Performed by: AUDIOLOGIST

## 2020-03-16 PROCEDURE — 3008F PR BODY MASS INDEX (BMI) DOCUMENTED: ICD-10-PCS | Mod: CPTII,S$GLB,, | Performed by: OTOLARYNGOLOGY

## 2020-03-16 RX ORDER — FLUTICASONE PROPIONATE 50 MCG
1 SPRAY, SUSPENSION (ML) NASAL 2 TIMES DAILY
Qty: 15.8 ML | Refills: 3 | Status: SHIPPED | OUTPATIENT
Start: 2020-03-16 | End: 2020-12-16 | Stop reason: SDUPTHER

## 2020-03-16 RX ORDER — AZELASTINE 1 MG/ML
1 SPRAY, METERED NASAL 2 TIMES DAILY
Qty: 30 ML | Refills: 3 | Status: SHIPPED | OUTPATIENT
Start: 2020-03-16 | End: 2020-12-16 | Stop reason: SDUPTHER

## 2020-03-16 RX ORDER — CLINDAMYCIN PHOSPHATE 10 MG/G
GEL TOPICAL
COMMUNITY
Start: 2020-03-12 | End: 2021-03-04

## 2020-03-16 NOTE — PATIENT INSTRUCTIONS
"Information and instructions from your visit with me today:    · Start using the following medication nasal sprays:   · Fluticasone spray:    This medication is a steroid spray. It stays within the nose and does not have absorption into the body that leads to side effects that one has with oral steroid medication. Fluticasone nasal spray is the same as the Flonase brand nasal spray. Discuss with your pharmacist if the price is lower over the counter or with a prescription ( this varies depending on insurance). The medication that is over the counter is the same as the prescription medication. Use this medication as instructed on the prescription, 2 sprays on each side of your nose once daily.     · Azelastine  spray:  This medication is an antihistamine used to treat nasal symptoms of allergy, which works specifically in the nose unlike antihistamine pills which have more of an effect on the whole body. Use this medication as instructed on the prescription, 1 spray on each side of your nose twice daily.     Additional instructions for medication sprays  Place the tip of the medication bottle in your nose and aim slightly up and out on each side to get medication high and deep into your nose and sinuses, and not have it all deposit in the very front of your nose. Aim the tip of the nozzle towards the outer corner of your eye . You can imagine aiming towards the back of your eyeball on each side for this, as opposed to straight back to the center of your nose and head.     You need to use this medication every day regardless of symptoms, as it takes time ( a few weeks) to work and get the benefits. It does not work on an "as needed" basis like taking a decongestant. If your symptoms only occur in a particular season, then the medication can be used seasonally instead of year long. For seasonal symptoms, you should start using the spray twice daily a month before when you normally have symptoms ( for example, if " symptoms start in August, should start at the end of June).     · Start nasal irrigations with saline solution:    SALINE SINUS RINSE (Randy Med brand): You should do a full bottle, half on one side of your nose and half on the other, 1-2 times per day (or more if able to, you cannot do this too much). Follow the instructions on the box: mix the salt packet with clean water (bottle, previously boiled, distilled, etc -- not tap water) to the line on the bottle to make the irrigation.     NASAL SALINE SPRAY ( 0.9%) There are several different brands found in the cold and flu aisle of the pharmacy. You can also use simply saline or other brand of saline spray that delivers saline by gentle mist if you have difficulty or discomfort with neilmed rinse. Always rinse your nose with saline prior to using medication sprays and wait a couple of hours before using again.      · Do not use nasal decongestant sprays such as Afrin or similar products.  This can cause long term physical nasal addiction. Afrin should only be used if having nose bleeds and should not be used for more than 2-3 days in a row . It is a not a medication that should be used for a long period of time.     It was nice meeting you today, and I look forward to helping you feel better soon. Please don't hesitate to call if you have any other questions or concerns, or if I can be of any assistance in the meantime.      Megan Borden MD    [facility]    Phone  [phone]    Fax      [phone]        Megan Borden MD  Otorhinolaryngology

## 2020-03-16 NOTE — PROGRESS NOTES
Otolaryngology Clinic Note  Date:  03/16/2020     Chief complaint:  Hearing loss    History of Present Illness  Ramon Carlos is a 44 y.o. male  presenting today for a new evaluation and treatment of   Hearing loss. Has noted worsening of left sided hearing  Has meningococcal meningitis as a child. 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 Has tinnitus described as wooshing sound. Unclear which ear  Right handed shooter and has had noise exposure ( mostly has been bilateral besides hunting)  No otorrhea. No dizziness. No autophony   No family history of hearing loss at young age.     He also has seasonal allergies with sneezing/itchy eyes /congestion. Has tried astelin in the past , taste made him gag. He is currently on flonase and has not tried flonase and astelin together. Also takes claritin. Has had allergy testing in the past which was positive for several things including grass and cats. Has not undergone SCIT ever.     He was previously seen by Dr. Reed, unfortunately his records have not been uploaded into the [facility] system yet.     Past Medical History  Past Medical History:   Diagnosis Date    Allergic rhinitis, seasonal     Calcaneus fracture     Family history of colon cancer     High-density lipoprotein deficiency     Incomplete right bundle branch block     Meningitis     Rotator cuff injury     Vitamin D deficiency disease         Past Surgical History  Past Surgical History:   Procedure Laterality Date    ADENOIDECTOMY      TONSILLECTOMY          Medications  Current Outpatient Medications on File Prior to Visit   Medication Sig Dispense Refill    ascorbic acid (VITAMIN C) 100 MG tablet Take 1000 mg daily      cholecalciferol, vitamin D3, (VITAMIN D3) 2,000 unit Cap Take 1 capsule by mouth once daily.    "   clindamycin phosphate 1% (CLINDAGEL) 1 % gel       fish oil-omega-3 fatty acids 300-1,000 mg capsule Take 2 g by mouth once daily.       No current facility-administered medications on file prior to visit.        Review of Systems  Review of Systems   Constitutional: Negative for fever.   HENT: Positive for hearing loss and tinnitus. Negative for ear discharge and nosebleeds.    Eyes: Positive for blurred vision (wears contact, unchanged).   Respiratory:        Sneezing when exposed to allergies   Skin: Positive for itching (eyes itch when gets seasonal allergies).   Neurological: Negative for dizziness and headaches.   Endo/Heme/Allergies: Positive for environmental allergies.        Social History   reports that he has never smoked. He has never used smokeless tobacco. He reports that he drinks alcohol. He reports that he does not use drugs.     Family History  Family History   Problem Relation Age of Onset    Cancer Mother         breast    Hyperlipidemia Father     Hypertension Father     Cancer Paternal Uncle         colon    Hyperlipidemia Maternal Grandmother     Hypertension Maternal Grandmother     Alcohol abuse Maternal Grandfather     Depression Paternal Grandmother     Cancer Paternal Grandfather         colon        Physical Exam   Vitals:    03/16/20 1324   BP: 122/70    Body mass index is 25.25 kg/m².  Weight: 79.8 kg (176 lb)   Height: 5' 10" (177.8 cm)     GENERAL: alert, no acute distress.  HEAD: normocephalic.   SKIN: no lesions of skin of head and neck area.  EYES: lids and lashes normal. Pupils equal . No proptosis  EARS: external ear without lesion, normal pinna shape and position.  External auditory canal with normal cerumen. Bilateral tympanic membrane retraction, no middle ear effusion. Left TM with significant scar band inferiorly extending to head of malleus. No perforation  NOSE: external nose without abnormality, septum with mild deviation, turbinates with mild bogginess " and erythema  ORAL CAVITY/OROPHARYNX: dentition good,no mass or lesion of gingiva/buccal mucosa/floor of mouth/tongue. tongue midline and mobile. No fasciculations. Symmetric palate rise. Uvula midline.   NECK: trachea midline.   LYMPH NODES:No cervical lymphadenopathy.  RESPIRATORY: no stridor, no stertor. Voice normal. Respirations nonlabored.  NEURO: alert, responds to questions appropriately.   Cranial nerve exam as indicated in above sections and additionally  Facial movement symmetric with good eye closure and symmetric smile.   PSYCH:mood appropriate    AUDIOLOGY RESULTS  Audiometric evaluation including audiogram, tympanometry, acoustic reflexes, and speech discrimination which was performed  was personally reviewed and interpreted.  Notable findings on the audiogram were asymmetric hearing loss in high frequency with right moderate sloping to severe and left moderate loss. Also shows low frequency conductive loss of left ear.  Tympanometry revealed Type A tympanogram on the left and Type A tympanogram on the right. Speech discrimination was 88% when presented at 75 decibels on the left, and 96% when presented at 50 decibels on the right.     Report of the audiologist performing this audiometric testing was also reviewed     Imaging:  The patient does not have any pertinent and/or recent imaging of the head and neck.     Labs:  CBC  Recent Labs   Lab 12/12/18  0715 05/22/19  1137 12/13/19  1630   WBC 5.09 2.75 L 6.51   Hemoglobin 14.9 14.6 15.2   Hematocrit 43.0 43.2 46.1   Mean Corpuscular Volume 85 87 88   Platelets 201 173 146 L     BMP  Recent Labs   Lab 05/22/19  1137 05/23/19  1056 12/13/19  1630   Glucose 80 115 H 89   Sodium 137 139 139   Potassium 4.3 3.8 4.1   Chloride 99 102 102   CO2 28 30 H 27   BUN, Bld 14 12 15   Creatinine 1.0 1.0 1.0   Calcium 10.0 9.4 9.8         Assessment  1. Asymmetrical hearing loss of right ear  - MRI IAC/Temporal Bones W W/O Contrast; Future    2. Seasonal allergic  rhinitis due to pollen       Plan:  Discussed plan of care with patient in detail and all questions answered. Patient reported understanding of plan of care.     Asymmetric hearing loss- MRI< may need ct temporal bone as well - unclear etiology of low frequency conductive loss on left     Allergic rhinitis: dual nasal spray therapy as combo of steroid and antihistamine nasal spray has been shown in evidence based studies to be better than either alone. Discussed medication administration technique ( point toward outer corner of eye and not towards nasal septum) and use nasal saline prior to medication sprays. If still having difficulty with taste of astelin after changing technique, can stop    Follow up 3 months if needed, will message with mri results

## 2020-03-16 NOTE — PROGRESS NOTES
Click on link to view Audiogram  Document on 3/16/2020 11:23 AM by Tisha Bose MA CCC-A: Audiogram    Ramon Carlos was seen today for an audiologic evaluation for hearing loss.  He reported a history of hearing loss due to middle ear problems since childhood but feels that his hearing is getting worse.    Tympanometry revealed a Type A tympanogram bilaterally.  Audiogram results revealed a moderate to severe high frequency sensorineural hearing loss in the right ear and a mild to moderate mixed hearing loss in the left ear.  Speech audiometry revealed a speech reception threshold at 35dBHL and a word recognition score of 88% for the left ear and a speech reception threshold at 10dBHL and a word recognition score of 96% for the right ear.    Recommendations:  1. Otologic evaluation  2. Hearing aid consult  3. Annual Audiogram

## 2020-03-17 ENCOUNTER — HOSPITAL ENCOUNTER (OUTPATIENT)
Dept: RADIOLOGY | Facility: HOSPITAL | Age: 45
Discharge: HOME | End: 2020-03-17
Attending: OTOLARYNGOLOGY
Payer: COMMERCIAL

## 2020-03-17 PROCEDURE — A9585 GADOBUTROL INJECTION: HCPCS | Performed by: OTOLARYNGOLOGY

## 2020-03-17 PROCEDURE — 70553 MRI BRAIN STEM W/O & W/DYE: CPT | Mod: TC

## 2020-03-17 PROCEDURE — 70553 MRI BRAIN STEM W/O & W/DYE: CPT | Mod: 26,,, | Performed by: RADIOLOGY

## 2020-03-17 PROCEDURE — 25500020 PHARM REV CODE 255: Performed by: OTOLARYNGOLOGY

## 2020-03-17 PROCEDURE — 70553 MRI IAC/TEMPORAL BONES W W/O CONTRAST: ICD-10-PCS | Mod: 26,,, | Performed by: RADIOLOGY

## 2020-03-17 RX ORDER — GADOBUTROL 604.72 MG/ML
8 INJECTION INTRAVENOUS
Status: COMPLETED | OUTPATIENT
Start: 2020-03-17 | End: 2020-03-17

## 2020-03-17 RX ADMIN — GADOBUTROL 8 ML: 604.72 INJECTION INTRAVENOUS at 06:03

## 2020-03-20 ENCOUNTER — TELEPHONE (OUTPATIENT)
Dept: OTOLARYNGOLOGY | Facility: CLINIC | Age: 45
End: 2020-03-20

## 2020-03-20 NOTE — TELEPHONE ENCOUNTER
----- Message from Megan Borden MD sent at 3/20/2020  9:55 AM CDT -----  Please call the patient back with normal results .

## 2020-04-21 DIAGNOSIS — Z01.84 ANTIBODY RESPONSE EXAMINATION: ICD-10-CM

## 2020-04-22 ENCOUNTER — LAB VISIT (OUTPATIENT)
Dept: LAB | Facility: HOSPITAL | Age: 45
End: 2020-04-22
Attending: INTERNAL MEDICINE
Payer: COMMERCIAL

## 2020-04-22 DIAGNOSIS — Z01.84 ANTIBODY RESPONSE EXAMINATION: ICD-10-CM

## 2020-04-22 LAB — SARS-COV-2 IGG SERPL QL IA: NEGATIVE

## 2020-04-22 PROCEDURE — 86769 SARS-COV-2 COVID-19 ANTIBODY: CPT

## 2020-04-22 PROCEDURE — 36415 COLL VENOUS BLD VENIPUNCTURE: CPT

## 2020-05-13 ENCOUNTER — PATIENT MESSAGE (OUTPATIENT)
Dept: OTOLARYNGOLOGY | Facility: CLINIC | Age: 45
End: 2020-05-13

## 2020-05-15 ENCOUNTER — CLINICAL SUPPORT (OUTPATIENT)
Dept: AUDIOLOGY | Facility: CLINIC | Age: 45
End: 2020-05-15
Payer: COMMERCIAL

## 2020-05-15 DIAGNOSIS — H90.A32 MIXED CONDUCTIVE AND SENSORINEURAL HEARING LOSS OF LEFT EAR WITH RESTRICTED HEARING OF RIGHT EAR: Primary | ICD-10-CM

## 2020-05-15 PROCEDURE — 99499 NO LOS: ICD-10-PCS | Mod: S$GLB,,, | Performed by: AUDIOLOGIST

## 2020-05-15 PROCEDURE — 99499 UNLISTED E&M SERVICE: CPT | Mod: S$GLB,,, | Performed by: AUDIOLOGIST

## 2020-05-15 NOTE — PROGRESS NOTES
Ramon Carlos was seen today for a hearing aid consult.  I discussed in detail the technology level/pricing with the patient.  A recommendation was made for a pair of premium ART aids- rechargeable and the patient decided to place the order.  I will order a pair of Audeo M90-R aids from [facility] and contact him for a hearing aid evaluation when the aids arrive.  A purchase agreement was signed and a copy was given to the patient.

## 2020-05-28 ENCOUNTER — CLINICAL SUPPORT (OUTPATIENT)
Dept: AUDIOLOGY | Facility: CLINIC | Age: 45
End: 2020-05-28

## 2020-05-28 DIAGNOSIS — H90.3 SENSORINEURAL HEARING LOSS, BILATERAL: Primary | ICD-10-CM

## 2020-05-28 PROCEDURE — 99499 NO LOS: ICD-10-PCS | Mod: S$GLB,,, | Performed by: AUDIOLOGIST

## 2020-05-28 PROCEDURE — 99499 UNLISTED E&M SERVICE: CPT | Mod: S$GLB,,, | Performed by: AUDIOLOGIST

## 2020-05-28 NOTE — PROGRESS NOTES
Ramon Carlos was seen today for a hearing aid fitting.  He was fit bilaterally with Phonak Audeo M90-R hearing aids.  REM and FB tests were completed and the hearing aids were programmed.  The fit was good and he reported good subjective benefit.  I reviewed the use/care manual with the patient and he practiced insertion/removal of the hearing aids.  I bluetooth paired his mobile phone with his hearing aids and demonstrated how to answer/reject phone calls.  He also downloaded the [employer] berenice on his mobile phone and I bluetooth paired the hearing aids to his berenice.  A two week follow up was scheduled 6/11/20 at 3:00.  He paid in full for the hearing aids.    Right hearing aid:   Phonak Audeo M90-R   SN [id number]   #2, med    P8 color   Large open dome   Warranty until 8/16/23    Left hearing aid:   Phonak Audeo M90-R   SN [id number]   #2, med    P8 color   Large open dome   Warranty until 8/16/23

## 2020-06-11 ENCOUNTER — CLINICAL SUPPORT (OUTPATIENT)
Dept: AUDIOLOGY | Facility: CLINIC | Age: 45
End: 2020-06-11
Payer: COMMERCIAL

## 2020-06-11 DIAGNOSIS — H90.3 SENSORINEURAL HEARING LOSS, BILATERAL: Primary | ICD-10-CM

## 2020-06-11 PROCEDURE — 99499 NO LOS: ICD-10-PCS | Mod: S$GLB,,, | Performed by: AUDIOLOGIST

## 2020-06-11 PROCEDURE — 99499 UNLISTED E&M SERVICE: CPT | Mod: S$GLB,,, | Performed by: AUDIOLOGIST

## 2020-10-02 ENCOUNTER — PATIENT MESSAGE (OUTPATIENT)
Dept: FAMILY MEDICINE | Facility: CLINIC | Age: 45
End: 2020-10-02

## 2020-11-03 ENCOUNTER — TELEPHONE (OUTPATIENT)
Dept: AUDIOLOGY | Facility: CLINIC | Age: 45
End: 2020-11-03

## 2020-11-03 NOTE — TELEPHONE ENCOUNTER
----- Message from Tara Portillo sent at 11/3/2020 11:15 AM CST -----  Regarding: self [phone]  .Type: Patient Call Back    Who called: self     What is the request in detail:  Having problems setting up hearing aid on new phone    Can the clinic reply by [facility]? Call back     Would the patient rather a call back or a response via [facility]?  Call back     Best call back number  [phone]

## 2020-11-06 ENCOUNTER — CLINICAL SUPPORT (OUTPATIENT)
Dept: AUDIOLOGY | Facility: CLINIC | Age: 45
End: 2020-11-06
Payer: COMMERCIAL

## 2020-11-06 DIAGNOSIS — H90.3 SENSORINEURAL HEARING LOSS, BILATERAL: Primary | ICD-10-CM

## 2020-11-06 PROCEDURE — 99499 UNLISTED E&M SERVICE: CPT | Mod: S$GLB,,, | Performed by: AUDIOLOGIST

## 2020-11-06 PROCEDURE — 99499 NO LOS: ICD-10-PCS | Mod: S$GLB,,, | Performed by: AUDIOLOGIST

## 2020-11-09 ENCOUNTER — CLINICAL SUPPORT (OUTPATIENT)
Dept: AUDIOLOGY | Facility: CLINIC | Age: 45
End: 2020-11-09
Payer: COMMERCIAL

## 2020-11-09 DIAGNOSIS — H90.3 SENSORINEURAL HEARING LOSS, BILATERAL: Primary | ICD-10-CM

## 2020-11-09 PROCEDURE — 99499 UNLISTED E&M SERVICE: CPT | Mod: S$GLB,,, | Performed by: AUDIOLOGIST

## 2020-11-09 PROCEDURE — 99499 NO LOS: ICD-10-PCS | Mod: S$GLB,,, | Performed by: AUDIOLOGIST

## 2020-11-09 NOTE — PROGRESS NOTES
Ramon Carlos was seen today for a hearing aid adjustment.  He reported that since getting the iphone 12 he is unable to connect to his hearing aids through the [employer] berenice.  He also reported that certain sounds like when his keys fall onto the tile floor is uncomfortably loud.  He also reported that he still hears a crackle when he is connected via bluetooth and having a phone conversation or streaming music.    I called [facility] to inquire about the berenice.  She was able to talk me through the steps of deleting and then adding the Phonak berenice and connecting to the hearing aids.  I adjusted the gain for high frequency soft sounds to help with the problem of his keys being uncomfortably loud.  He reported better sound quality.  I called [facility] again for suggestions on getting rid of the crackle when connected to his bluetooth.  There was no headroom between gain and MPO in the low frequencies in the phone program for the left hearing aid.  I decreased the gain in the low frequencies in that program and it improved the sound quality when listening to music and having a phone conversation.  However, the [employer] rep had me uninstall the berenice and reinstall the berenice in the process and after multiple attempts, only the left hearing aid would connect in the berenice.  The appointment ended without a resolution and another appointment was scheduled for Monday 11/9 to resolve the issue.

## 2020-11-09 NOTE — PROGRESS NOTES
Ramon Carlos was seen today for a hearing aid adjustment.  He reported that high frequency sounds like a spoon hitting a coffee cup or keys rattling are still bothersome.  I decreased the high frequency gain for soft sounds a little more.  I also increased the gain in his bluetooth settings for media and speech because he felt like in general things were not sounding as balanced and loud in the left hearing aid.  In doing this, the hearing aid once again has a crackle sound quality when listening to music or phone calls.  He will try at these settings and decide if he wants me to decrease gain to get rid of crackle or deal with the crackle for an overall better sound quality.  I was still unable to connect both hearing aids the his [employer] berenice.  I was able to connect both hearing aids to the [employer] berenice on my phone.  [facility] recommended that he do the ios update to 14.2 and then try to connect to the berenice.  If that still doesn't work then we will set hearing aids back to factory setting and start the programming process all over.  He will contact me in the next few days to let me know if he was able to connect to the berenice and how the sound quality of the hearing aids are.

## 2020-11-30 ENCOUNTER — PATIENT OUTREACH (OUTPATIENT)
Dept: ADMINISTRATIVE | Facility: HOSPITAL | Age: 45
End: 2020-11-30

## 2020-12-14 ENCOUNTER — OFFICE VISIT (OUTPATIENT)
Dept: FAMILY MEDICINE | Facility: CLINIC | Age: 45
End: 2020-12-14
Payer: COMMERCIAL

## 2020-12-14 VITALS
WEIGHT: 178.13 LBS | DIASTOLIC BLOOD PRESSURE: 74 MMHG | SYSTOLIC BLOOD PRESSURE: 124 MMHG | RESPIRATION RATE: 14 BRPM | BODY MASS INDEX: 24.13 KG/M2 | HEART RATE: 55 BPM | TEMPERATURE: 98 F | OXYGEN SATURATION: 97 % | HEIGHT: 72 IN

## 2020-12-14 DIAGNOSIS — E78.6 HIGH-DENSITY LIPOPROTEIN DEFICIENCY: ICD-10-CM

## 2020-12-14 DIAGNOSIS — Z80.0 FAMILY HISTORY OF COLON CANCER: ICD-10-CM

## 2020-12-14 DIAGNOSIS — Z12.12 SCREENING FOR COLORECTAL CANCER: ICD-10-CM

## 2020-12-14 DIAGNOSIS — J30.2 SEASONAL ALLERGIC RHINITIS, UNSPECIFIED TRIGGER: ICD-10-CM

## 2020-12-14 DIAGNOSIS — Z12.11 SCREENING FOR COLORECTAL CANCER: ICD-10-CM

## 2020-12-14 DIAGNOSIS — Z12.5 SCREENING FOR PROSTATE CANCER: ICD-10-CM

## 2020-12-14 DIAGNOSIS — E55.9 VITAMIN D DEFICIENCY DISEASE: ICD-10-CM

## 2020-12-14 DIAGNOSIS — Z00.00 ROUTINE MEDICAL EXAM: Primary | ICD-10-CM

## 2020-12-14 PROCEDURE — 1126F AMNT PAIN NOTED NONE PRSNT: CPT | Mod: S$GLB,,, | Performed by: INTERNAL MEDICINE

## 2020-12-14 PROCEDURE — 3008F PR BODY MASS INDEX (BMI) DOCUMENTED: ICD-10-PCS | Mod: CPTII,S$GLB,, | Performed by: INTERNAL MEDICINE

## 2020-12-14 PROCEDURE — 99999 PR PBB SHADOW E&M-EST. PATIENT-LVL III: CPT | Mod: PBBFAC,,, | Performed by: INTERNAL MEDICINE

## 2020-12-14 PROCEDURE — 3008F BODY MASS INDEX DOCD: CPT | Mod: CPTII,S$GLB,, | Performed by: INTERNAL MEDICINE

## 2020-12-14 PROCEDURE — 1126F PR PAIN SEVERITY QUANTIFIED, NO PAIN PRESENT: ICD-10-PCS | Mod: S$GLB,,, | Performed by: INTERNAL MEDICINE

## 2020-12-14 PROCEDURE — 99396 PREV VISIT EST AGE 40-64: CPT | Mod: S$GLB,,, | Performed by: INTERNAL MEDICINE

## 2020-12-14 PROCEDURE — 99396 PR PREVENTIVE VISIT,EST,40-64: ICD-10-PCS | Mod: S$GLB,,, | Performed by: INTERNAL MEDICINE

## 2020-12-14 PROCEDURE — 99999 PR PBB SHADOW E&M-EST. PATIENT-LVL III: ICD-10-PCS | Mod: PBBFAC,,, | Performed by: INTERNAL MEDICINE

## 2020-12-14 RX ORDER — TURMERIC 400 MG
1 CAPSULE ORAL DAILY
COMMUNITY

## 2020-12-14 NOTE — PROGRESS NOTES
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  .  He has no cardiopulmonary symptoms.  He does have a strong family history of colon polyps and willing to pursue colonoscopy since will be 45.  No first degree relatives with colon cancer better paternal grandfather had colon cancer at age 53 and an uncle with colon cancer.  His father and 2 uncles had colon polyps.     Reviewed the history that in May of 2019 he likely had significant prostatitis.  Thinks it is possibly related to constipation at that time.  No hemorrhoid problems lately.  No prostatitis or other urinary symptoms lately.    Knee problems or better having discontinue doing martial arts.  He does use turmeric.  He continues on vitamin-D and we will reassess the vitamin-D deficiency.      He has had an HDL deficiency in the past that may or may not be influenced by his exercise and we will reassess obviously not exercising as much at this time.  We discussed his LDL elevation could well be genetic and we discussed the benefits of Crestor.  Apparently his father had a lot of myalgias on statin.  If indeed he was to developed that we discussed that we could try others and possibly even try Zetia      ROS:   CONST: weight stable. EYES: no vision change. ENT: no sore throat. CV: no chest pain w/ exertion. RESP: no shortness of breath. GI: no nausea, vomiting, diarrhea. No dysphagia. : no urinary issues. MUSCULOSKELETAL: no new myalgias or arthralgias. SKIN: no new changes. NEURO: no focal deficits. PSYCH: no new issues. ENDOCRINE: no polyuria. HEME: no lymph nodes. ALLERGY: no general pruritis.    Past medical history:  1.  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  Hearing loss secondary to prior ear infections, now with hearing aids  3.  Anaphylaxis to penicillin when treated for meningitis  4.  Possible SVT or other tachyarrhythmia that  responds to vagal maneuvers  5.  Incomplete right bundle-branch block on EKG  6.  Vitamin D deficiency  7.  Low HDL at 38  8.  Achilles tendinitis  9.  Chronic ALLERGIES and history of sinusitis, Dr. Reed  10.  Clinical prostatitis and jose juan PSA   -seen Roger    Past surgical history:  1.  Tonsillectomy    Family history: Mother  at age 42 of breast cancer.  Father   of COPD exacerbation and had underlying heart disease by an angiogram.  Father had colon polyps and hypertension.  One sister with benign breast disease.  Colon polyps in father and an uncle and a paternal grandfather and a paternal uncle with colon cancer.      PSA 41 to 4    Social history: Rare alcohol at home maybe once or twice a week.  Never smoked.   with children.  Active in martial arts.  Works as a psychiatrist within our system next    Vital signs as above  Gen: no distress  EYES: conjunctiva clear, non-icteric, PERRL  ENT: nose clear, nasal mucosa normal, oropharynx clear and moist, teeth good  NECK:supple, thyroid non-palpable  RESP: effort is good, lungs clear  CV: heart RRR w/o murmur, gallops or rubs; no carotid bruits, no edema  GI: abdomen soft, non-distended, non-tender, no hepatosplenomegaly  MS: gait normal, no clubbing or cyanosis of the digits  SKIN: no rashes, warm to touch      Labs reviewed      Ramon was seen today for annual exam.    Diagnoses and all orders for this visit:    Routine medical exam, update labs and PSA, pursue colonoscopy at the [facility] per his choice  -     Prostate Specific Antigen, Diagnostic; Future  -     TSH; Future  -     Lipid Panel; Future  -     CBC Auto Differential; Future  -     Vitamin D; Future  -     Comprehensive Metabolic Panel; Future  -     Hemoglobin A1C; Future    Screening for prostate cancer    Vitamin D deficiency disease    High-density lipoprotein deficiency    Family history of colon cancer    Seasonal allergic rhinitis, unspecified trigger    Screening for  "colorectal cancer  -     Case Request Endoscopy: COLONOSCOPY                           Access may not be therapeutic given the discussion of the differential above ooo"This note will not be shared with the patient."    "

## 2020-12-15 ENCOUNTER — LAB VISIT (OUTPATIENT)
Dept: LAB | Facility: HOSPITAL | Age: 45
End: 2020-12-15
Attending: INTERNAL MEDICINE
Payer: COMMERCIAL

## 2020-12-15 ENCOUNTER — PATIENT MESSAGE (OUTPATIENT)
Dept: FAMILY MEDICINE | Facility: CLINIC | Age: 45
End: 2020-12-15

## 2020-12-15 DIAGNOSIS — Z00.00 ROUTINE MEDICAL EXAM: Primary | ICD-10-CM

## 2020-12-15 DIAGNOSIS — Z00.00 ROUTINE MEDICAL EXAM: ICD-10-CM

## 2020-12-15 LAB
25(OH)D3+25(OH)D2 SERPL-MCNC: 40 NG/ML (ref 30–96)
ALBUMIN SERPL BCP-MCNC: 4.3 G/DL (ref 3.5–5.2)
ALP SERPL-CCNC: 52 U/L (ref 55–135)
ALT SERPL W/O P-5'-P-CCNC: 22 U/L (ref 10–44)
ANION GAP SERPL CALC-SCNC: 8 MMOL/L (ref 8–16)
AST SERPL-CCNC: 23 U/L (ref 10–40)
BASOPHILS # BLD AUTO: 0.04 K/UL (ref 0–0.2)
BASOPHILS NFR BLD: 0.8 % (ref 0–1.9)
BILIRUB SERPL-MCNC: 0.5 MG/DL (ref 0.1–1)
BUN SERPL-MCNC: 18 MG/DL (ref 6–20)
CALCIUM SERPL-MCNC: 9.5 MG/DL (ref 8.7–10.5)
CHLORIDE SERPL-SCNC: 104 MMOL/L (ref 95–110)
CHOLEST SERPL-MCNC: 240 MG/DL (ref 120–199)
CHOLEST/HDLC SERPL: 5.3 {RATIO} (ref 2–5)
CO2 SERPL-SCNC: 29 MMOL/L (ref 23–29)
COMPLEXED PSA SERPL-MCNC: 1.3 NG/ML (ref 0–4)
CREAT SERPL-MCNC: 1 MG/DL (ref 0.5–1.4)
DIFFERENTIAL METHOD: NORMAL
EOSINOPHIL # BLD AUTO: 0.1 K/UL (ref 0–0.5)
EOSINOPHIL NFR BLD: 2.1 % (ref 0–8)
ERYTHROCYTE [DISTWIDTH] IN BLOOD BY AUTOMATED COUNT: 12.2 % (ref 11.5–14.5)
EST. GFR  (AFRICAN AMERICAN): >60 ML/MIN/1.73 M^2
EST. GFR  (NON AFRICAN AMERICAN): >60 ML/MIN/1.73 M^2
ESTIMATED AVG GLUCOSE: 94 MG/DL (ref 68–131)
GLUCOSE SERPL-MCNC: 98 MG/DL (ref 70–110)
HBA1C MFR BLD HPLC: 4.9 % (ref 4–5.6)
HCT VFR BLD AUTO: 44.6 % (ref 40–54)
HDLC SERPL-MCNC: 45 MG/DL (ref 40–75)
HDLC SERPL: 18.8 % (ref 20–50)
HGB BLD-MCNC: 14.9 G/DL (ref 14–18)
IMM GRANULOCYTES # BLD AUTO: 0.01 K/UL (ref 0–0.04)
IMM GRANULOCYTES NFR BLD AUTO: 0.2 % (ref 0–0.5)
LDLC SERPL CALC-MCNC: 174.8 MG/DL (ref 63–159)
LYMPHOCYTES # BLD AUTO: 2.2 K/UL (ref 1–4.8)
LYMPHOCYTES NFR BLD: 43.2 % (ref 18–48)
MCH RBC QN AUTO: 29.2 PG (ref 27–31)
MCHC RBC AUTO-ENTMCNC: 33.4 G/DL (ref 32–36)
MCV RBC AUTO: 88 FL (ref 82–98)
MONOCYTES # BLD AUTO: 0.5 K/UL (ref 0.3–1)
MONOCYTES NFR BLD: 9.7 % (ref 4–15)
NEUTROPHILS # BLD AUTO: 2.3 K/UL (ref 1.8–7.7)
NEUTROPHILS NFR BLD: 44 % (ref 38–73)
NONHDLC SERPL-MCNC: 195 MG/DL
NRBC BLD-RTO: 0 /100 WBC
PLATELET # BLD AUTO: 200 K/UL (ref 150–350)
PMV BLD AUTO: 10.1 FL (ref 9.2–12.9)
POTASSIUM SERPL-SCNC: 4 MMOL/L (ref 3.5–5.1)
PROT SERPL-MCNC: 7.5 G/DL (ref 6–8.4)
RBC # BLD AUTO: 5.1 M/UL (ref 4.6–6.2)
SODIUM SERPL-SCNC: 141 MMOL/L (ref 136–145)
TRIGL SERPL-MCNC: 101 MG/DL (ref 30–150)
TSH SERPL DL<=0.005 MIU/L-ACNC: 1.29 UIU/ML (ref 0.4–4)
WBC # BLD AUTO: 5.14 K/UL (ref 3.9–12.7)

## 2020-12-15 PROCEDURE — 83036 HEMOGLOBIN GLYCOSYLATED A1C: CPT

## 2020-12-15 PROCEDURE — 82306 VITAMIN D 25 HYDROXY: CPT

## 2020-12-15 PROCEDURE — 85025 COMPLETE CBC W/AUTO DIFF WBC: CPT

## 2020-12-15 PROCEDURE — 36415 COLL VENOUS BLD VENIPUNCTURE: CPT

## 2020-12-15 PROCEDURE — 84153 ASSAY OF PSA TOTAL: CPT

## 2020-12-15 PROCEDURE — 80061 LIPID PANEL: CPT

## 2020-12-15 PROCEDURE — 80053 COMPREHEN METABOLIC PANEL: CPT

## 2020-12-15 PROCEDURE — 84443 ASSAY THYROID STIM HORMONE: CPT

## 2020-12-15 RX ORDER — EZETIMIBE 10 MG/1
10 TABLET ORAL DAILY
Qty: 30 TABLET | Refills: 12 | Status: SHIPPED | OUTPATIENT
Start: 2020-12-15 | End: 2021-11-03 | Stop reason: SDUPTHER

## 2020-12-16 ENCOUNTER — PATIENT MESSAGE (OUTPATIENT)
Dept: FAMILY MEDICINE | Facility: CLINIC | Age: 45
End: 2020-12-16

## 2020-12-16 ENCOUNTER — IMMUNIZATION (OUTPATIENT)
Dept: OBSTETRICS AND GYNECOLOGY | Facility: CLINIC | Age: 45
End: 2020-12-16
Payer: COMMERCIAL

## 2020-12-16 DIAGNOSIS — Z23 NEED FOR VACCINATION: ICD-10-CM

## 2020-12-16 PROCEDURE — 0001A COVID-19, MRNA, LNP-S, PF, 30 MCG/0.3 ML DOSE VACCINE: CPT | Mod: CV19,,, | Performed by: FAMILY MEDICINE

## 2020-12-16 PROCEDURE — 0001A COVID-19, MRNA, LNP-S, PF, 30 MCG/0.3 ML DOSE VACCINE: ICD-10-PCS | Mod: CV19,,, | Performed by: FAMILY MEDICINE

## 2020-12-16 PROCEDURE — 91300 COVID-19, MRNA, LNP-S, PF, 30 MCG/0.3 ML DOSE VACCINE: ICD-10-PCS | Mod: ,,, | Performed by: FAMILY MEDICINE

## 2020-12-16 PROCEDURE — 91300 COVID-19, MRNA, LNP-S, PF, 30 MCG/0.3 ML DOSE VACCINE: CPT | Mod: ,,, | Performed by: FAMILY MEDICINE

## 2020-12-16 RX ORDER — FLUTICASONE PROPIONATE 50 MCG
1 SPRAY, SUSPENSION (ML) NASAL 2 TIMES DAILY
Qty: 16 G | Refills: 3 | Status: SHIPPED | OUTPATIENT
Start: 2020-12-16 | End: 2023-12-13 | Stop reason: SDUPTHER

## 2020-12-16 RX ORDER — AZELASTINE 1 MG/ML
1 SPRAY, METERED NASAL 2 TIMES DAILY
Qty: 30 ML | Refills: 3 | Status: SHIPPED | OUTPATIENT
Start: 2020-12-16 | End: 2023-12-13 | Stop reason: SDUPTHER

## 2020-12-21 ENCOUNTER — CLINICAL SUPPORT (OUTPATIENT)
Dept: AUDIOLOGY | Facility: CLINIC | Age: 45
End: 2020-12-21
Payer: COMMERCIAL

## 2020-12-21 DIAGNOSIS — H90.3 SENSORINEURAL HEARING LOSS, BILATERAL: Primary | ICD-10-CM

## 2020-12-21 PROCEDURE — 99499 UNLISTED E&M SERVICE: CPT | Mod: S$GLB,,, | Performed by: AUDIOLOGIST

## 2020-12-21 PROCEDURE — 99499 NO LOS: ICD-10-PCS | Mod: S$GLB,,, | Performed by: AUDIOLOGIST

## 2020-12-21 NOTE — PROGRESS NOTES
Ramon Carlos was seen today for a hearing aid adjustment.  He has been experiencing a crackly/static sound when connected via bluetooth that the person on the other phone can hear.  I called him on his mobile phone and I could hear the crackly connection.  I reset his hearing aids to factory settings and then reprogrammed the hearing aids and performed the Real Ear and FB tests.  He reported a much better sound quality and when I spoke with him again on the phone there was not crackly sound and the connection sounded much better.  He will try at these settings.  I discussed with him about sending the hearing aids off repair for them to check the bluetooth component of the hearing aids.  He still experiences a static when he streams music.  He will contact me when he is ready to send them off to [facility] to be checked.

## 2021-01-06 ENCOUNTER — IMMUNIZATION (OUTPATIENT)
Dept: OBSTETRICS AND GYNECOLOGY | Facility: CLINIC | Age: 46
End: 2021-01-06
Payer: COMMERCIAL

## 2021-01-06 DIAGNOSIS — Z23 NEED FOR VACCINATION: ICD-10-CM

## 2021-01-06 PROCEDURE — 0002A COVID-19, MRNA, LNP-S, PF, 30 MCG/0.3 ML DOSE VACCINE: CPT | Mod: PBBFAC | Performed by: FAMILY MEDICINE

## 2021-01-06 PROCEDURE — 91300 COVID-19, MRNA, LNP-S, PF, 30 MCG/0.3 ML DOSE VACCINE: CPT | Mod: PBBFAC | Performed by: FAMILY MEDICINE

## 2021-02-10 ENCOUNTER — LAB VISIT (OUTPATIENT)
Dept: LAB | Facility: HOSPITAL | Age: 46
End: 2021-02-10
Attending: INTERNAL MEDICINE
Payer: COMMERCIAL

## 2021-02-10 ENCOUNTER — PATIENT MESSAGE (OUTPATIENT)
Dept: FAMILY MEDICINE | Facility: CLINIC | Age: 46
End: 2021-02-10

## 2021-02-10 DIAGNOSIS — Z00.00 ROUTINE MEDICAL EXAM: ICD-10-CM

## 2021-02-10 LAB
ALBUMIN SERPL BCP-MCNC: 4.1 G/DL (ref 3.5–5.2)
ALP SERPL-CCNC: 49 U/L (ref 55–135)
ALT SERPL W/O P-5'-P-CCNC: 25 U/L (ref 10–44)
ANION GAP SERPL CALC-SCNC: 5 MMOL/L (ref 8–16)
AST SERPL-CCNC: 20 U/L (ref 10–40)
BILIRUB SERPL-MCNC: 0.8 MG/DL (ref 0.1–1)
BUN SERPL-MCNC: 20 MG/DL (ref 6–20)
CALCIUM SERPL-MCNC: 8.5 MG/DL (ref 8.7–10.5)
CHLORIDE SERPL-SCNC: 105 MMOL/L (ref 95–110)
CHOLEST SERPL-MCNC: 179 MG/DL (ref 120–199)
CHOLEST/HDLC SERPL: 4.5 {RATIO} (ref 2–5)
CO2 SERPL-SCNC: 28 MMOL/L (ref 23–29)
CREAT SERPL-MCNC: 1 MG/DL (ref 0.5–1.4)
EST. GFR  (AFRICAN AMERICAN): >60 ML/MIN/1.73 M^2
EST. GFR  (NON AFRICAN AMERICAN): >60 ML/MIN/1.73 M^2
GLUCOSE SERPL-MCNC: 91 MG/DL (ref 70–110)
HDLC SERPL-MCNC: 40 MG/DL (ref 40–75)
HDLC SERPL: 22.3 % (ref 20–50)
LDLC SERPL CALC-MCNC: 121.8 MG/DL (ref 63–159)
NONHDLC SERPL-MCNC: 139 MG/DL
POTASSIUM SERPL-SCNC: 4.5 MMOL/L (ref 3.5–5.1)
PROT SERPL-MCNC: 6.9 G/DL (ref 6–8.4)
SODIUM SERPL-SCNC: 138 MMOL/L (ref 136–145)
TRIGL SERPL-MCNC: 86 MG/DL (ref 30–150)

## 2021-02-10 PROCEDURE — 80053 COMPREHEN METABOLIC PANEL: CPT

## 2021-02-10 PROCEDURE — 80061 LIPID PANEL: CPT

## 2021-02-10 PROCEDURE — 36415 COLL VENOUS BLD VENIPUNCTURE: CPT

## 2021-02-25 ENCOUNTER — TELEPHONE (OUTPATIENT)
Dept: FAMILY MEDICINE | Facility: CLINIC | Age: 46
End: 2021-02-25

## 2021-03-02 ENCOUNTER — HOSPITAL ENCOUNTER (EMERGENCY)
Facility: HOSPITAL | Age: 46
Discharge: HOME | End: 2021-03-02
Attending: EMERGENCY MEDICINE
Payer: COMMERCIAL

## 2021-03-02 ENCOUNTER — PATIENT MESSAGE (OUTPATIENT)
Dept: UROLOGY | Facility: CLINIC | Age: 46
End: 2021-03-02

## 2021-03-02 VITALS
SYSTOLIC BLOOD PRESSURE: 132 MMHG | RESPIRATION RATE: 18 BRPM | HEIGHT: 71 IN | TEMPERATURE: 99 F | OXYGEN SATURATION: 96 % | DIASTOLIC BLOOD PRESSURE: 76 MMHG | BODY MASS INDEX: 24.64 KG/M2 | HEART RATE: 51 BPM | WEIGHT: 176 LBS

## 2021-03-02 DIAGNOSIS — N20.1 LEFT URETERAL CALCULUS: Primary | ICD-10-CM

## 2021-03-02 LAB
ALBUMIN SERPL BCP-MCNC: 4.6 G/DL (ref 3.5–5.2)
ALP SERPL-CCNC: 60 U/L (ref 55–135)
ALT SERPL W/O P-5'-P-CCNC: 25 U/L (ref 10–44)
ANION GAP SERPL CALC-SCNC: 13 MMOL/L (ref 8–16)
AST SERPL-CCNC: 22 U/L (ref 10–40)
BASOPHILS # BLD AUTO: 0.05 K/UL (ref 0–0.2)
BASOPHILS NFR BLD: 0.7 % (ref 0–1.9)
BILIRUB SERPL-MCNC: 0.6 MG/DL (ref 0.1–1)
BILIRUB UR QL STRIP: NEGATIVE
BUN SERPL-MCNC: 18 MG/DL (ref 6–20)
CALCIUM SERPL-MCNC: 9.2 MG/DL (ref 8.7–10.5)
CHLORIDE SERPL-SCNC: 105 MMOL/L (ref 95–110)
CLARITY UR: CLEAR
CO2 SERPL-SCNC: 23 MMOL/L (ref 23–29)
COLOR UR: YELLOW
CREAT SERPL-MCNC: 1 MG/DL (ref 0.5–1.4)
DIFFERENTIAL METHOD: ABNORMAL
EOSINOPHIL # BLD AUTO: 0.1 K/UL (ref 0–0.5)
EOSINOPHIL NFR BLD: 1.3 % (ref 0–8)
ERYTHROCYTE [DISTWIDTH] IN BLOOD BY AUTOMATED COUNT: 11.9 % (ref 11.5–14.5)
EST. GFR  (AFRICAN AMERICAN): >60 ML/MIN/1.73 M^2
EST. GFR  (NON AFRICAN AMERICAN): >60 ML/MIN/1.73 M^2
GLUCOSE SERPL-MCNC: 101 MG/DL (ref 70–110)
GLUCOSE UR QL STRIP: NEGATIVE
HCT VFR BLD AUTO: 46.3 % (ref 40–54)
HGB BLD-MCNC: 15.3 G/DL (ref 14–18)
HGB UR QL STRIP: ABNORMAL
IMM GRANULOCYTES # BLD AUTO: 0.01 K/UL (ref 0–0.04)
IMM GRANULOCYTES NFR BLD AUTO: 0.1 % (ref 0–0.5)
KETONES UR QL STRIP: ABNORMAL
LEUKOCYTE ESTERASE UR QL STRIP: NEGATIVE
LYMPHOCYTES # BLD AUTO: 3.7 K/UL (ref 1–4.8)
LYMPHOCYTES NFR BLD: 49.2 % (ref 18–48)
MCH RBC QN AUTO: 28.7 PG (ref 27–31)
MCHC RBC AUTO-ENTMCNC: 33 G/DL (ref 32–36)
MCV RBC AUTO: 87 FL (ref 82–98)
MICROSCOPIC COMMENT: ABNORMAL
MONOCYTES # BLD AUTO: 0.7 K/UL (ref 0.3–1)
MONOCYTES NFR BLD: 9.8 % (ref 4–15)
NEUTROPHILS # BLD AUTO: 2.9 K/UL (ref 1.8–7.7)
NEUTROPHILS NFR BLD: 38.9 % (ref 38–73)
NITRITE UR QL STRIP: NEGATIVE
NRBC BLD-RTO: 0 /100 WBC
PH UR STRIP: 6 [PH] (ref 5–8)
PLATELET # BLD AUTO: 223 K/UL (ref 150–350)
PMV BLD AUTO: 10.2 FL (ref 9.2–12.9)
POTASSIUM SERPL-SCNC: 3.7 MMOL/L (ref 3.5–5.1)
PROT SERPL-MCNC: 7.8 G/DL (ref 6–8.4)
PROT UR QL STRIP: NEGATIVE
RBC # BLD AUTO: 5.34 M/UL (ref 4.6–6.2)
RBC #/AREA URNS HPF: >100 /HPF (ref 0–4)
SODIUM SERPL-SCNC: 141 MMOL/L (ref 136–145)
SP GR UR STRIP: 1.01 (ref 1–1.03)
URN SPEC COLLECT METH UR: ABNORMAL
UROBILINOGEN UR STRIP-ACNC: NEGATIVE EU/DL
WBC # BLD AUTO: 7.54 K/UL (ref 3.9–12.7)
WBC #/AREA URNS HPF: 0 /HPF (ref 0–5)

## 2021-03-02 PROCEDURE — 96374 THER/PROPH/DIAG INJ IV PUSH: CPT

## 2021-03-02 PROCEDURE — 96361 HYDRATE IV INFUSION ADD-ON: CPT

## 2021-03-02 PROCEDURE — 85025 COMPLETE CBC W/AUTO DIFF WBC: CPT

## 2021-03-02 PROCEDURE — 81000 URINALYSIS NONAUTO W/SCOPE: CPT

## 2021-03-02 PROCEDURE — 25000003 PHARM REV CODE 250: Performed by: EMERGENCY MEDICINE

## 2021-03-02 PROCEDURE — 80053 COMPREHEN METABOLIC PANEL: CPT

## 2021-03-02 PROCEDURE — 96375 TX/PRO/DX INJ NEW DRUG ADDON: CPT

## 2021-03-02 PROCEDURE — 63600175 PHARM REV CODE 636 W HCPCS: Performed by: EMERGENCY MEDICINE

## 2021-03-02 PROCEDURE — 99284 EMERGENCY DEPT VISIT MOD MDM: CPT | Mod: 25

## 2021-03-02 PROCEDURE — 96376 TX/PRO/DX INJ SAME DRUG ADON: CPT

## 2021-03-02 RX ORDER — ONDANSETRON 4 MG/1
4 TABLET, FILM COATED ORAL EVERY 8 HOURS PRN
Qty: 12 TABLET | Refills: 1 | Status: SHIPPED | OUTPATIENT
Start: 2021-03-02 | End: 2021-12-15

## 2021-03-02 RX ORDER — OXYCODONE AND ACETAMINOPHEN 5; 325 MG/1; MG/1
1 TABLET ORAL EVERY 4 HOURS PRN
Qty: 18 TABLET | Refills: 0 | Status: SHIPPED | OUTPATIENT
Start: 2021-03-02 | End: 2021-12-15

## 2021-03-02 RX ORDER — ONDANSETRON 4 MG/1
4 TABLET, ORALLY DISINTEGRATING ORAL
Status: COMPLETED | OUTPATIENT
Start: 2021-03-02 | End: 2021-03-02

## 2021-03-02 RX ORDER — DIPHENHYDRAMINE HYDROCHLORIDE 50 MG/ML
25 INJECTION INTRAMUSCULAR; INTRAVENOUS
Status: COMPLETED | OUTPATIENT
Start: 2021-03-02 | End: 2021-03-02

## 2021-03-02 RX ORDER — TAMSULOSIN HYDROCHLORIDE 0.4 MG/1
0.4 CAPSULE ORAL
Status: COMPLETED | OUTPATIENT
Start: 2021-03-02 | End: 2021-03-02

## 2021-03-02 RX ORDER — KETOROLAC TROMETHAMINE 30 MG/ML
30 INJECTION, SOLUTION INTRAMUSCULAR; INTRAVENOUS
Status: COMPLETED | OUTPATIENT
Start: 2021-03-02 | End: 2021-03-02

## 2021-03-02 RX ORDER — HYDROMORPHONE HYDROCHLORIDE 2 MG/ML
1 INJECTION, SOLUTION INTRAMUSCULAR; INTRAVENOUS; SUBCUTANEOUS
Status: COMPLETED | OUTPATIENT
Start: 2021-03-02 | End: 2021-03-02

## 2021-03-02 RX ORDER — TAMSULOSIN HYDROCHLORIDE 0.4 MG/1
0.4 CAPSULE ORAL DAILY
Qty: 10 CAPSULE | Refills: 0 | Status: SHIPPED | OUTPATIENT
Start: 2021-03-02 | End: 2021-12-15

## 2021-03-02 RX ADMIN — HYDROMORPHONE HYDROCHLORIDE 1 MG: 2 INJECTION INTRAMUSCULAR; INTRAVENOUS; SUBCUTANEOUS at 12:03

## 2021-03-02 RX ADMIN — TAMSULOSIN HYDROCHLORIDE 0.4 MG: 0.4 CAPSULE ORAL at 05:03

## 2021-03-02 RX ADMIN — SODIUM CHLORIDE 1000 ML: 0.9 INJECTION, SOLUTION INTRAVENOUS at 03:03

## 2021-03-02 RX ADMIN — ONDANSETRON 4 MG: 4 TABLET, ORALLY DISINTEGRATING ORAL at 02:03

## 2021-03-02 RX ADMIN — SODIUM CHLORIDE 1000 ML: 0.9 INJECTION, SOLUTION INTRAVENOUS at 12:03

## 2021-03-02 RX ADMIN — DIPHENHYDRAMINE HYDROCHLORIDE 25 MG: 50 INJECTION INTRAMUSCULAR; INTRAVENOUS at 12:03

## 2021-03-02 RX ADMIN — KETOROLAC TROMETHAMINE 30 MG: 30 INJECTION, SOLUTION INTRAMUSCULAR at 12:03

## 2021-03-02 RX ADMIN — HYDROMORPHONE HYDROCHLORIDE 1 MG: 2 INJECTION INTRAMUSCULAR; INTRAVENOUS; SUBCUTANEOUS at 02:03

## 2021-03-02 RX ADMIN — ONDANSETRON 4 MG: 4 TABLET, ORALLY DISINTEGRATING ORAL at 12:03

## 2021-03-04 ENCOUNTER — HOSPITAL ENCOUNTER (OUTPATIENT)
Dept: RADIOLOGY | Facility: HOSPITAL | Age: 46
Discharge: HOME | End: 2021-03-04
Attending: UROLOGY
Payer: COMMERCIAL

## 2021-03-04 ENCOUNTER — OFFICE VISIT (OUTPATIENT)
Dept: UROLOGY | Facility: CLINIC | Age: 46
End: 2021-03-04
Payer: COMMERCIAL

## 2021-03-04 VITALS
DIASTOLIC BLOOD PRESSURE: 80 MMHG | HEIGHT: 71 IN | WEIGHT: 176 LBS | BODY MASS INDEX: 24.64 KG/M2 | SYSTOLIC BLOOD PRESSURE: 134 MMHG

## 2021-03-04 DIAGNOSIS — R97.20 ELEVATED PSA: ICD-10-CM

## 2021-03-04 DIAGNOSIS — R31.0 HEMATURIA, GROSS: ICD-10-CM

## 2021-03-04 DIAGNOSIS — N20.1 URETERAL CALCULUS: ICD-10-CM

## 2021-03-04 DIAGNOSIS — N20.1 URETERAL CALCULUS: Primary | ICD-10-CM

## 2021-03-04 PROCEDURE — 74176 CT RENAL STONE STUDY ABD PELVIS WO: ICD-10-PCS | Mod: 26,,, | Performed by: RADIOLOGY

## 2021-03-04 PROCEDURE — 3008F BODY MASS INDEX DOCD: CPT | Mod: CPTII,S$GLB,, | Performed by: UROLOGY

## 2021-03-04 PROCEDURE — 99214 OFFICE O/P EST MOD 30 MIN: CPT | Mod: S$GLB,,, | Performed by: UROLOGY

## 2021-03-04 PROCEDURE — 99999 PR PBB SHADOW E&M-EST. PATIENT-LVL IV: CPT | Mod: PBBFAC,,, | Performed by: UROLOGY

## 2021-03-04 PROCEDURE — 74176 CT ABD & PELVIS W/O CONTRAST: CPT | Mod: TC

## 2021-03-04 PROCEDURE — 99214 PR OFFICE/OUTPT VISIT, EST, LEVL IV, 30-39 MIN: ICD-10-PCS | Mod: S$GLB,,, | Performed by: UROLOGY

## 2021-03-04 PROCEDURE — 74176 CT ABD & PELVIS W/O CONTRAST: CPT | Mod: 26,,, | Performed by: RADIOLOGY

## 2021-03-04 PROCEDURE — 1125F PR PAIN SEVERITY QUANTIFIED, PAIN PRESENT: ICD-10-PCS | Mod: S$GLB,,, | Performed by: UROLOGY

## 2021-03-04 PROCEDURE — 1125F AMNT PAIN NOTED PAIN PRSNT: CPT | Mod: S$GLB,,, | Performed by: UROLOGY

## 2021-03-04 PROCEDURE — 3008F PR BODY MASS INDEX (BMI) DOCUMENTED: ICD-10-PCS | Mod: CPTII,S$GLB,, | Performed by: UROLOGY

## 2021-03-04 PROCEDURE — 99999 PR PBB SHADOW E&M-EST. PATIENT-LVL IV: ICD-10-PCS | Mod: PBBFAC,,, | Performed by: UROLOGY

## 2021-03-05 PROCEDURE — 82365 CALCULUS SPECTROSCOPY: CPT | Performed by: UROLOGY

## 2021-03-08 DIAGNOSIS — N20.1 URETERAL CALCULUS: Primary | ICD-10-CM

## 2021-03-09 ENCOUNTER — LAB VISIT (OUTPATIENT)
Dept: LAB | Facility: HOSPITAL | Age: 46
End: 2021-03-09
Attending: UROLOGY
Payer: COMMERCIAL

## 2021-03-09 DIAGNOSIS — N20.1 URETERAL CALCULUS: ICD-10-CM

## 2021-03-12 LAB
COMPN STONE: NORMAL
SPECIMEN SOURCE: NORMAL
STONE ANALYSIS IR-IMP: NORMAL

## 2021-08-09 ENCOUNTER — CLINICAL SUPPORT (OUTPATIENT)
Dept: AUDIOLOGY | Facility: CLINIC | Age: 46
End: 2021-08-09
Payer: COMMERCIAL

## 2021-08-09 DIAGNOSIS — H90.3 SENSORINEURAL HEARING LOSS, BILATERAL: Primary | ICD-10-CM

## 2021-08-09 PROCEDURE — 99499 NO LOS: ICD-10-PCS | Mod: S$GLB,,, | Performed by: AUDIOLOGIST

## 2021-08-09 PROCEDURE — 99499 UNLISTED E&M SERVICE: CPT | Mod: S$GLB,,, | Performed by: AUDIOLOGIST

## 2021-10-14 ENCOUNTER — PATIENT MESSAGE (OUTPATIENT)
Dept: FAMILY MEDICINE | Facility: CLINIC | Age: 46
End: 2021-10-14

## 2021-10-25 ENCOUNTER — IMMUNIZATION (OUTPATIENT)
Dept: OBSTETRICS AND GYNECOLOGY | Facility: CLINIC | Age: 46
End: 2021-10-25
Payer: COMMERCIAL

## 2021-10-25 DIAGNOSIS — Z23 NEED FOR VACCINATION: Primary | ICD-10-CM

## 2021-10-25 PROCEDURE — 91300 COVID-19, MRNA, LNP-S, PF, 30 MCG/0.3 ML DOSE VACCINE: CPT | Mod: PBBFAC | Performed by: FAMILY MEDICINE

## 2021-10-25 PROCEDURE — 0003A COVID-19, MRNA, LNP-S, PF, 30 MCG/0.3 ML DOSE VACCINE: CPT | Mod: PBBFAC | Performed by: FAMILY MEDICINE

## 2021-11-03 RX ORDER — EZETIMIBE 10 MG/1
10 TABLET ORAL DAILY
Qty: 30 TABLET | Refills: 12 | Status: SHIPPED | OUTPATIENT
Start: 2021-11-03 | End: 2021-12-15 | Stop reason: SDUPTHER

## 2021-12-15 ENCOUNTER — OFFICE VISIT (OUTPATIENT)
Dept: FAMILY MEDICINE | Facility: CLINIC | Age: 46
End: 2021-12-15
Payer: COMMERCIAL

## 2021-12-15 DIAGNOSIS — Z12.12 SCREENING FOR COLORECTAL CANCER: ICD-10-CM

## 2021-12-15 DIAGNOSIS — E78.6 HIGH-DENSITY LIPOPROTEIN DEFICIENCY: ICD-10-CM

## 2021-12-15 DIAGNOSIS — Z12.11 SCREENING FOR COLORECTAL CANCER: ICD-10-CM

## 2021-12-15 DIAGNOSIS — E55.9 VITAMIN D DEFICIENCY DISEASE: ICD-10-CM

## 2021-12-15 DIAGNOSIS — Z80.0 FAMILY HISTORY OF COLON CANCER: ICD-10-CM

## 2021-12-15 DIAGNOSIS — Z12.5 SCREENING FOR PROSTATE CANCER: ICD-10-CM

## 2021-12-15 DIAGNOSIS — Z00.00 ROUTINE MEDICAL EXAM: Primary | ICD-10-CM

## 2021-12-15 DIAGNOSIS — Z87.438 HISTORY OF PROSTATITIS: ICD-10-CM

## 2021-12-15 PROCEDURE — 99396 PR PREVENTIVE VISIT,EST,40-64: ICD-10-PCS | Mod: S$GLB,,, | Performed by: INTERNAL MEDICINE

## 2021-12-15 PROCEDURE — 99999 PR PBB SHADOW E&M-EST. PATIENT-LVL II: ICD-10-PCS | Mod: PBBFAC,,, | Performed by: INTERNAL MEDICINE

## 2021-12-15 PROCEDURE — 99999 PR PBB SHADOW E&M-EST. PATIENT-LVL II: CPT | Mod: PBBFAC,,, | Performed by: INTERNAL MEDICINE

## 2021-12-15 PROCEDURE — 99396 PREV VISIT EST AGE 40-64: CPT | Mod: S$GLB,,, | Performed by: INTERNAL MEDICINE

## 2021-12-15 RX ORDER — EZETIMIBE 10 MG/1
10 TABLET ORAL DAILY
Qty: 30 TABLET | Refills: 12 | Status: SHIPPED | OUTPATIENT
Start: 2021-12-15 | End: 2023-01-10 | Stop reason: SDUPTHER

## 2021-12-20 ENCOUNTER — LAB VISIT (OUTPATIENT)
Dept: LAB | Facility: HOSPITAL | Age: 46
End: 2021-12-20
Attending: INTERNAL MEDICINE
Payer: COMMERCIAL

## 2021-12-20 DIAGNOSIS — Z00.00 ROUTINE MEDICAL EXAM: ICD-10-CM

## 2021-12-20 LAB
25(OH)D3+25(OH)D2 SERPL-MCNC: 52 NG/ML (ref 30–96)
ALBUMIN SERPL BCP-MCNC: 4.3 G/DL (ref 3.5–5.2)
ALP SERPL-CCNC: 59 U/L (ref 55–135)
ALT SERPL W/O P-5'-P-CCNC: 33 U/L (ref 10–44)
ANION GAP SERPL CALC-SCNC: 7 MMOL/L (ref 8–16)
AST SERPL-CCNC: 22 U/L (ref 10–40)
BASOPHILS # BLD AUTO: 0.04 K/UL (ref 0–0.2)
BASOPHILS NFR BLD: 0.8 % (ref 0–1.9)
BILIRUB SERPL-MCNC: 1.1 MG/DL (ref 0.1–1)
BUN SERPL-MCNC: 13 MG/DL (ref 6–20)
CALCIUM SERPL-MCNC: 9.1 MG/DL (ref 8.7–10.5)
CHLORIDE SERPL-SCNC: 106 MMOL/L (ref 95–110)
CHOLEST SERPL-MCNC: 164 MG/DL (ref 120–199)
CHOLEST/HDLC SERPL: 4.1 {RATIO} (ref 2–5)
CO2 SERPL-SCNC: 29 MMOL/L (ref 23–29)
COMPLEXED PSA SERPL-MCNC: 1.1 NG/ML (ref 0–4)
CREAT SERPL-MCNC: 1.1 MG/DL (ref 0.5–1.4)
DIFFERENTIAL METHOD: NORMAL
EOSINOPHIL # BLD AUTO: 0.1 K/UL (ref 0–0.5)
EOSINOPHIL NFR BLD: 2.3 % (ref 0–8)
ERYTHROCYTE [DISTWIDTH] IN BLOOD BY AUTOMATED COUNT: 12.3 % (ref 11.5–14.5)
EST. GFR  (AFRICAN AMERICAN): >60 ML/MIN/1.73 M^2
EST. GFR  (NON AFRICAN AMERICAN): >60 ML/MIN/1.73 M^2
ESTIMATED AVG GLUCOSE: 100 MG/DL (ref 68–131)
GLUCOSE SERPL-MCNC: 96 MG/DL (ref 70–110)
HBA1C MFR BLD: 5.1 % (ref 4–5.6)
HCT VFR BLD AUTO: 44.2 % (ref 40–54)
HDLC SERPL-MCNC: 40 MG/DL (ref 40–75)
HDLC SERPL: 24.4 % (ref 20–50)
HGB BLD-MCNC: 14.7 G/DL (ref 14–18)
IMM GRANULOCYTES # BLD AUTO: 0.01 K/UL (ref 0–0.04)
IMM GRANULOCYTES NFR BLD AUTO: 0.2 % (ref 0–0.5)
LDLC SERPL CALC-MCNC: 109.4 MG/DL (ref 63–159)
LYMPHOCYTES # BLD AUTO: 2.1 K/UL (ref 1–4.8)
LYMPHOCYTES NFR BLD: 39.2 % (ref 18–48)
MCH RBC QN AUTO: 29.5 PG (ref 27–31)
MCHC RBC AUTO-ENTMCNC: 33.3 G/DL (ref 32–36)
MCV RBC AUTO: 89 FL (ref 82–98)
MONOCYTES # BLD AUTO: 0.6 K/UL (ref 0.3–1)
MONOCYTES NFR BLD: 10.3 % (ref 4–15)
NEUTROPHILS # BLD AUTO: 2.5 K/UL (ref 1.8–7.7)
NEUTROPHILS NFR BLD: 47.2 % (ref 38–73)
NONHDLC SERPL-MCNC: 124 MG/DL
NRBC BLD-RTO: 0 /100 WBC
PLATELET # BLD AUTO: 201 K/UL (ref 150–450)
PMV BLD AUTO: 9.9 FL (ref 9.2–12.9)
POTASSIUM SERPL-SCNC: 4.4 MMOL/L (ref 3.5–5.1)
PROT SERPL-MCNC: 7.1 G/DL (ref 6–8.4)
RBC # BLD AUTO: 4.98 M/UL (ref 4.6–6.2)
SODIUM SERPL-SCNC: 142 MMOL/L (ref 136–145)
TRIGL SERPL-MCNC: 73 MG/DL (ref 30–150)
TSH SERPL DL<=0.005 MIU/L-ACNC: 1.15 UIU/ML (ref 0.4–4)
WBC # BLD AUTO: 5.33 K/UL (ref 3.9–12.7)

## 2021-12-20 PROCEDURE — 85025 COMPLETE CBC W/AUTO DIFF WBC: CPT | Performed by: INTERNAL MEDICINE

## 2021-12-20 PROCEDURE — 80053 COMPREHEN METABOLIC PANEL: CPT | Performed by: INTERNAL MEDICINE

## 2021-12-20 PROCEDURE — 84443 ASSAY THYROID STIM HORMONE: CPT | Performed by: INTERNAL MEDICINE

## 2021-12-20 PROCEDURE — 82306 VITAMIN D 25 HYDROXY: CPT | Performed by: INTERNAL MEDICINE

## 2021-12-20 PROCEDURE — 84153 ASSAY OF PSA TOTAL: CPT | Performed by: INTERNAL MEDICINE

## 2021-12-20 PROCEDURE — 83036 HEMOGLOBIN GLYCOSYLATED A1C: CPT | Performed by: INTERNAL MEDICINE

## 2021-12-20 PROCEDURE — 36415 COLL VENOUS BLD VENIPUNCTURE: CPT | Performed by: INTERNAL MEDICINE

## 2021-12-20 PROCEDURE — 80061 LIPID PANEL: CPT | Performed by: INTERNAL MEDICINE

## 2022-08-31 ENCOUNTER — PATIENT MESSAGE (OUTPATIENT)
Dept: FAMILY MEDICINE | Facility: CLINIC | Age: 47
End: 2022-08-31
Payer: OTHER GOVERNMENT

## 2022-08-31 DIAGNOSIS — D22.9 MULTIPLE ATYPICAL SKIN MOLES: Primary | ICD-10-CM

## 2022-10-25 ENCOUNTER — PATIENT MESSAGE (OUTPATIENT)
Dept: FAMILY MEDICINE | Facility: CLINIC | Age: 47
End: 2022-10-25
Payer: OTHER GOVERNMENT

## 2022-10-25 DIAGNOSIS — Z00.00 ROUTINE MEDICAL EXAM: Primary | ICD-10-CM

## 2022-10-26 ENCOUNTER — PATIENT MESSAGE (OUTPATIENT)
Dept: ADMINISTRATIVE | Facility: HOSPITAL | Age: 47
End: 2022-10-26
Payer: OTHER GOVERNMENT

## 2022-12-01 ENCOUNTER — PATIENT MESSAGE (OUTPATIENT)
Dept: FAMILY MEDICINE | Facility: CLINIC | Age: 47
End: 2022-12-01
Payer: OTHER GOVERNMENT

## 2022-12-01 DIAGNOSIS — Z00.00 ROUTINE MEDICAL EXAM: Primary | ICD-10-CM

## 2022-12-05 ENCOUNTER — LAB VISIT (OUTPATIENT)
Dept: LAB | Facility: HOSPITAL | Age: 47
End: 2022-12-05
Attending: INTERNAL MEDICINE
Payer: OTHER GOVERNMENT

## 2022-12-05 DIAGNOSIS — Z00.00 ROUTINE MEDICAL EXAM: ICD-10-CM

## 2022-12-05 LAB
25(OH)D3+25(OH)D2 SERPL-MCNC: 55 NG/ML (ref 30–96)
ALBUMIN SERPL BCP-MCNC: 4.6 G/DL (ref 3.5–5.2)
ALP SERPL-CCNC: 64 U/L (ref 55–135)
ALT SERPL W/O P-5'-P-CCNC: 27 U/L (ref 10–44)
ANION GAP SERPL CALC-SCNC: 7 MMOL/L (ref 8–16)
AST SERPL-CCNC: 21 U/L (ref 10–40)
BASOPHILS # BLD AUTO: 0.05 K/UL (ref 0–0.2)
BASOPHILS NFR BLD: 0.9 % (ref 0–1.9)
BILIRUB SERPL-MCNC: 0.9 MG/DL (ref 0.1–1)
BUN SERPL-MCNC: 16 MG/DL (ref 6–20)
CALCIUM SERPL-MCNC: 9.6 MG/DL (ref 8.7–10.5)
CHLORIDE SERPL-SCNC: 104 MMOL/L (ref 95–110)
CHOLEST SERPL-MCNC: 211 MG/DL (ref 120–199)
CHOLEST/HDLC SERPL: 4.4 {RATIO} (ref 2–5)
CO2 SERPL-SCNC: 30 MMOL/L (ref 23–29)
COMPLEXED PSA SERPL-MCNC: 1.3 NG/ML (ref 0–4)
CREAT SERPL-MCNC: 1.1 MG/DL (ref 0.5–1.4)
DIFFERENTIAL METHOD: NORMAL
EOSINOPHIL # BLD AUTO: 0.1 K/UL (ref 0–0.5)
EOSINOPHIL NFR BLD: 1.3 % (ref 0–8)
ERYTHROCYTE [DISTWIDTH] IN BLOOD BY AUTOMATED COUNT: 11.9 % (ref 11.5–14.5)
EST. GFR  (NO RACE VARIABLE): >60 ML/MIN/1.73 M^2
ESTIMATED AVG GLUCOSE: 103 MG/DL (ref 68–131)
GLUCOSE SERPL-MCNC: 88 MG/DL (ref 70–110)
HBA1C MFR BLD: 5.2 % (ref 4–5.6)
HCT VFR BLD AUTO: 45.7 % (ref 40–54)
HDLC SERPL-MCNC: 48 MG/DL (ref 40–75)
HDLC SERPL: 22.7 % (ref 20–50)
HGB BLD-MCNC: 15.3 G/DL (ref 14–18)
IMM GRANULOCYTES # BLD AUTO: 0.01 K/UL (ref 0–0.04)
IMM GRANULOCYTES NFR BLD AUTO: 0.2 % (ref 0–0.5)
LDLC SERPL CALC-MCNC: 143.4 MG/DL (ref 63–159)
LYMPHOCYTES # BLD AUTO: 2.1 K/UL (ref 1–4.8)
LYMPHOCYTES NFR BLD: 38.5 % (ref 18–48)
MCH RBC QN AUTO: 28.7 PG (ref 27–31)
MCHC RBC AUTO-ENTMCNC: 33.5 G/DL (ref 32–36)
MCV RBC AUTO: 86 FL (ref 82–98)
MONOCYTES # BLD AUTO: 0.4 K/UL (ref 0.3–1)
MONOCYTES NFR BLD: 7.5 % (ref 4–15)
NEUTROPHILS # BLD AUTO: 2.8 K/UL (ref 1.8–7.7)
NEUTROPHILS NFR BLD: 51.6 % (ref 38–73)
NONHDLC SERPL-MCNC: 163 MG/DL
NRBC BLD-RTO: 0 /100 WBC
PLATELET # BLD AUTO: 196 K/UL (ref 150–450)
PMV BLD AUTO: 10.1 FL (ref 9.2–12.9)
POTASSIUM SERPL-SCNC: 4.2 MMOL/L (ref 3.5–5.1)
PROT SERPL-MCNC: 7.7 G/DL (ref 6–8.4)
RBC # BLD AUTO: 5.34 M/UL (ref 4.6–6.2)
SODIUM SERPL-SCNC: 141 MMOL/L (ref 136–145)
TRIGL SERPL-MCNC: 98 MG/DL (ref 30–150)
TSH SERPL DL<=0.005 MIU/L-ACNC: 1.34 UIU/ML (ref 0.4–4)
WBC # BLD AUTO: 5.48 K/UL (ref 3.9–12.7)

## 2022-12-05 PROCEDURE — 84153 ASSAY OF PSA TOTAL: CPT | Performed by: INTERNAL MEDICINE

## 2022-12-05 PROCEDURE — 80053 COMPREHEN METABOLIC PANEL: CPT | Performed by: INTERNAL MEDICINE

## 2022-12-05 PROCEDURE — 86480 TB TEST CELL IMMUN MEASURE: CPT | Performed by: INTERNAL MEDICINE

## 2022-12-05 PROCEDURE — 36415 COLL VENOUS BLD VENIPUNCTURE: CPT | Performed by: INTERNAL MEDICINE

## 2022-12-05 PROCEDURE — 82306 VITAMIN D 25 HYDROXY: CPT | Performed by: INTERNAL MEDICINE

## 2022-12-05 PROCEDURE — 85025 COMPLETE CBC W/AUTO DIFF WBC: CPT | Performed by: INTERNAL MEDICINE

## 2022-12-05 PROCEDURE — 84443 ASSAY THYROID STIM HORMONE: CPT | Performed by: INTERNAL MEDICINE

## 2022-12-05 PROCEDURE — 83036 HEMOGLOBIN GLYCOSYLATED A1C: CPT | Performed by: INTERNAL MEDICINE

## 2022-12-05 PROCEDURE — 80061 LIPID PANEL: CPT | Performed by: INTERNAL MEDICINE

## 2022-12-07 LAB
GAMMA INTERFERON BACKGROUND BLD IA-ACNC: 0.02 IU/ML
M TB IFN-G CD4+ BCKGRND COR BLD-ACNC: 0.02 IU/ML
MITOGEN IGNF BCKGRD COR BLD-ACNC: 5.31 IU/ML
TB GOLD PLUS: NEGATIVE
TB2 - NIL: 0.01 IU/ML

## 2022-12-13 ENCOUNTER — TELEPHONE (OUTPATIENT)
Dept: FAMILY MEDICINE | Facility: CLINIC | Age: 47
End: 2022-12-13
Payer: OTHER GOVERNMENT

## 2022-12-14 ENCOUNTER — OFFICE VISIT (OUTPATIENT)
Dept: FAMILY MEDICINE | Facility: CLINIC | Age: 47
End: 2022-12-14
Payer: OTHER GOVERNMENT

## 2022-12-14 VITALS
WEIGHT: 181 LBS | BODY MASS INDEX: 25.34 KG/M2 | OXYGEN SATURATION: 96 % | DIASTOLIC BLOOD PRESSURE: 72 MMHG | HEART RATE: 85 BPM | HEIGHT: 71 IN | SYSTOLIC BLOOD PRESSURE: 126 MMHG | TEMPERATURE: 98 F

## 2022-12-14 DIAGNOSIS — Z12.11 SCREENING FOR COLORECTAL CANCER: ICD-10-CM

## 2022-12-14 DIAGNOSIS — Z12.12 SCREENING FOR COLORECTAL CANCER: ICD-10-CM

## 2022-12-14 DIAGNOSIS — Z00.00 ROUTINE MEDICAL EXAM: Primary | ICD-10-CM

## 2022-12-14 DIAGNOSIS — J30.2 SEASONAL ALLERGIC RHINITIS, UNSPECIFIED TRIGGER: ICD-10-CM

## 2022-12-14 DIAGNOSIS — E78.5 HYPERLIPIDEMIA, UNSPECIFIED HYPERLIPIDEMIA TYPE: ICD-10-CM

## 2022-12-14 DIAGNOSIS — Z87.438 HISTORY OF PROSTATITIS: ICD-10-CM

## 2022-12-14 DIAGNOSIS — Z12.5 SCREENING FOR PROSTATE CANCER: ICD-10-CM

## 2022-12-14 DIAGNOSIS — Z80.0 FAMILY HISTORY OF COLON CANCER: ICD-10-CM

## 2022-12-14 DIAGNOSIS — E55.9 VITAMIN D DEFICIENCY DISEASE: ICD-10-CM

## 2022-12-14 PROCEDURE — 99396 PREV VISIT EST AGE 40-64: CPT | Mod: S$PBB,,, | Performed by: INTERNAL MEDICINE

## 2022-12-14 PROCEDURE — 99999 PR PBB SHADOW E&M-EST. PATIENT-LVL IV: CPT | Mod: PBBFAC,,, | Performed by: INTERNAL MEDICINE

## 2022-12-14 PROCEDURE — 99396 PR PREVENTIVE VISIT,EST,40-64: ICD-10-PCS | Mod: S$PBB,,, | Performed by: INTERNAL MEDICINE

## 2022-12-14 PROCEDURE — 99999 PR PBB SHADOW E&M-EST. PATIENT-LVL IV: ICD-10-PCS | Mod: PBBFAC,,, | Performed by: INTERNAL MEDICINE

## 2022-12-14 PROCEDURE — 99214 OFFICE O/P EST MOD 30 MIN: CPT | Mod: PBBFAC,PO | Performed by: INTERNAL MEDICINE

## 2022-12-14 NOTE — PROGRESS NOTES
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  .  He has no cardiopulmonary symptoms.  He does have a strong family history of colon polyps and willing to pursue colonoscopy since over 45.  No first degree relatives with colon cancer better paternal grandfather had colon cancer at age 53 and an uncle with colon cancer.  His father and 2 uncles had colon polyps.     Patient does get some postnasal drip when he gets in the shower causing him to gag.  Otherwise some allergy symptoms when around cats now.  We discussed starting his Allegra antihistamine again and continuing with the Flonase but he may also want to try some Astelin and nasal saline especially trying nasal saline prior to shower.  May need to use some Mucinex during the day to assist as well.    Reviewed the history that in May of 2019 he likely had significant prostatitis.  Thinks it is possibly related to constipation at that time.  No hemorrhoid problems lately.  No prostatitis or other urinary symptoms lately.  He did have some acute right flank pain and was diagnosed with a kidney stone which eventually did past.  He had no residual stones we reviewed the two CT scans.  He did visit the issue with Urology.    Knee problems are better having discontinued doing martial arts.  He does use turmeric.  He continues on vitamin-D and we will reassess the vitamin-D deficiency.  He also takes 1000 a vitamin-C and drinks orange juice and maybe should run the tolerable amount of vitamin-C by the urologist in regards to kidney stones.    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  He is exercising at the gym five days per week and doing less running but plans to restart running.  LDL increase.  Wife was on a low carb diet and may have increased cholesterol intake.  He will make adjustments.      He has had an HDL deficiency in the past  that may or may not be influenced by his exercise and we will reassess obviously not exercising as much at this time.  We discussed his LDL elevation could well be genetic and we discussed the benefits of Crestor.  Apparently his father had a lot of myalgias on statin.  If indeed he was to developed that we discussed that we would do zetia and he is on it      ROS:   CONST: weight stable. EYES: no vision change. ENT: no sore throat. CV: no chest pain w/ exertion. RESP: no shortness of breath. GI: no nausea, vomiting, diarrhea. No dysphagia. : no urinary issues. MUSCULOSKELETAL: no new myalgias or arthralgias. SKIN: no new changes. NEURO: no focal deficits. PSYCH: no new issues. ENDOCRINE: no polyuria. HEME: no lymph nodes. ALLERGY: no general pruritis.    Past medical history:  1.  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  Hearing loss secondary to prior ear infections, now with hearing aids  3.  Anaphylaxis to penicillin when treated for meningitis  4.  Possible SVT or other tachyarrhythmia that responds to vagal maneuvers  5.  Incomplete right bundle-branch block on EKG  6.  Vitamin D deficiency  7.  Low HDL at 38  8.  Achilles tendinitis  9.  Chronic ALLERGIES and history of sinusitis, Dr. Reed  10.  Clinical prostatitis and jose juan PSA   -seen Roger  11. Kidney stone 3/2021    Past surgical history:  1.  Tonsillectomy    Family history: Mother  at age 42 of breast cancer.  Father   of COPD exacerbation and had underlying heart disease by an angiogram.  Father had colon polyps and hypertension.  One sister with benign breast disease.  Colon polyps in father and an uncle and a paternal grandfather and a paternal uncle with colon cancer.      PSA 41 to 4 in past    Social history: Rare alcohol at home maybe once or twice a week.  Never smoked.   with children.  Active in martial arts.  Works as a psychiatrist within our system next    Vital signs as above  Gen: no distress  EYES: conjunctiva  clear, non-icteric, PERRL  ENT: nose clear, nasal mucosa normal, oropharynx clear and moist, teeth good, ear canals clear and tympanic membranes pearly  NECK:supple, thyroid non-palpable  RESP: effort is good, lungs clear  CV: heart RRR w/o murmur, gallops or rubs; no carotid bruits, no edema  GI: abdomen soft, non-distended, non-tender, no hepatosplenomegaly  MS: gait normal, no clubbing or cyanosis of the digits  SKIN: no rashes, warm to touch      Labs reviewed,  to 143    Arsalan was seen today for annual exam.    Diagnoses and all orders for this visit:    Routine medical exam, continue exercise, arrange colonoscopy, PSA is normal    Screening for prostate cancer    Screening for colorectal cancer  -     Ambulatory referral/consult to Endo Procedure ; Future    Vitamin D deficiency disease, continue supplement    Family history of colon cancer    History of prostatitis    Hyperlipidemia, unspecified hyperlipidemia type, increase LDL may be dietary, plans to start running and make adjustments, continue Zetia    Seasonal allergic rhinitis, unspecified trigger, plan as above

## 2023-03-13 ENCOUNTER — PATIENT OUTREACH (OUTPATIENT)
Dept: ADMINISTRATIVE | Facility: HOSPITAL | Age: 48
End: 2023-03-13
Payer: OTHER GOVERNMENT

## 2023-05-30 ENCOUNTER — CLINICAL SUPPORT (OUTPATIENT)
Dept: ENDOSCOPY | Facility: HOSPITAL | Age: 48
End: 2023-05-30
Attending: INTERNAL MEDICINE
Payer: OTHER GOVERNMENT

## 2023-05-30 VITALS — BODY MASS INDEX: 25.34 KG/M2 | WEIGHT: 181 LBS | HEIGHT: 71 IN

## 2023-05-30 DIAGNOSIS — Z12.12 SCREENING FOR COLORECTAL CANCER: ICD-10-CM

## 2023-05-30 DIAGNOSIS — Z12.11 SCREENING FOR COLORECTAL CANCER: ICD-10-CM

## 2023-05-30 RX ORDER — POLYETHYLENE GLYCOL 3350, SODIUM SULFATE ANHYDROUS, SODIUM BICARBONATE, SODIUM CHLORIDE, POTASSIUM CHLORIDE 236; 22.74; 6.74; 5.86; 2.97 G/4L; G/4L; G/4L; G/4L; G/4L
4 POWDER, FOR SOLUTION ORAL ONCE
Qty: 4000 ML | Refills: 0 | Status: SHIPPED | OUTPATIENT
Start: 2023-05-30 | End: 2023-05-30

## 2023-06-12 ENCOUNTER — PATIENT MESSAGE (OUTPATIENT)
Dept: OTOLARYNGOLOGY | Facility: CLINIC | Age: 48
End: 2023-06-12
Payer: OTHER GOVERNMENT

## 2023-07-05 ENCOUNTER — PATIENT MESSAGE (OUTPATIENT)
Dept: OTOLARYNGOLOGY | Facility: CLINIC | Age: 48
End: 2023-07-05
Payer: OTHER GOVERNMENT

## 2023-07-10 ENCOUNTER — ANESTHESIA EVENT (OUTPATIENT)
Dept: ENDOSCOPY | Facility: HOSPITAL | Age: 48
End: 2023-07-10
Payer: OTHER GOVERNMENT

## 2023-07-10 ENCOUNTER — ANESTHESIA (OUTPATIENT)
Dept: ENDOSCOPY | Facility: HOSPITAL | Age: 48
End: 2023-07-10
Payer: OTHER GOVERNMENT

## 2023-07-10 ENCOUNTER — HOSPITAL ENCOUNTER (OUTPATIENT)
Facility: HOSPITAL | Age: 48
Discharge: HOME | End: 2023-07-10
Attending: INTERNAL MEDICINE | Admitting: INTERNAL MEDICINE
Payer: OTHER GOVERNMENT

## 2023-07-10 VITALS
OXYGEN SATURATION: 98 % | TEMPERATURE: 98 F | HEIGHT: 71 IN | HEART RATE: 55 BPM | WEIGHT: 178 LBS | SYSTOLIC BLOOD PRESSURE: 117 MMHG | RESPIRATION RATE: 17 BRPM | BODY MASS INDEX: 24.92 KG/M2 | DIASTOLIC BLOOD PRESSURE: 72 MMHG

## 2023-07-10 DIAGNOSIS — Z12.11 SCREENING FOR COLON CANCER: ICD-10-CM

## 2023-07-10 PROCEDURE — 25000003 PHARM REV CODE 250: Performed by: NURSE ANESTHETIST, CERTIFIED REGISTERED

## 2023-07-10 PROCEDURE — E9220 PRA ENDO ANESTHESIA: ICD-10-PCS | Mod: 33,,, | Performed by: NURSE ANESTHETIST, CERTIFIED REGISTERED

## 2023-07-10 PROCEDURE — 63600175 PHARM REV CODE 636 W HCPCS: Performed by: NURSE ANESTHETIST, CERTIFIED REGISTERED

## 2023-07-10 PROCEDURE — 27201089 HC SNARE, DISP (ANY): Performed by: INTERNAL MEDICINE

## 2023-07-10 PROCEDURE — 45385 COLONOSCOPY W/LESION REMOVAL: CPT | Mod: 33,,, | Performed by: INTERNAL MEDICINE

## 2023-07-10 PROCEDURE — 00811 ANES LWR INTST NDSC NOS: CPT | Performed by: INTERNAL MEDICINE

## 2023-07-10 PROCEDURE — 45385 COLONOSCOPY W/LESION REMOVAL: CPT | Mod: PT | Performed by: INTERNAL MEDICINE

## 2023-07-10 PROCEDURE — 45385 PR COLONOSCOPY,REMV LESN,SNARE: ICD-10-PCS | Mod: 33,,, | Performed by: INTERNAL MEDICINE

## 2023-07-10 PROCEDURE — 88305 TISSUE EXAM BY PATHOLOGIST: CPT | Mod: 26,,, | Performed by: STUDENT IN AN ORGANIZED HEALTH CARE EDUCATION/TRAINING PROGRAM

## 2023-07-10 PROCEDURE — 37000009 HC ANESTHESIA EA ADD 15 MINS: Performed by: INTERNAL MEDICINE

## 2023-07-10 PROCEDURE — E9220 PRA ENDO ANESTHESIA: HCPCS | Mod: 33,,, | Performed by: NURSE ANESTHETIST, CERTIFIED REGISTERED

## 2023-07-10 PROCEDURE — 88305 TISSUE EXAM BY PATHOLOGIST: ICD-10-PCS | Mod: 26,,, | Performed by: STUDENT IN AN ORGANIZED HEALTH CARE EDUCATION/TRAINING PROGRAM

## 2023-07-10 PROCEDURE — 88305 TISSUE EXAM BY PATHOLOGIST: CPT | Performed by: STUDENT IN AN ORGANIZED HEALTH CARE EDUCATION/TRAINING PROGRAM

## 2023-07-10 PROCEDURE — 37000008 HC ANESTHESIA 1ST 15 MINUTES: Performed by: INTERNAL MEDICINE

## 2023-07-10 RX ORDER — LIDOCAINE HYDROCHLORIDE 20 MG/ML
INJECTION INTRAVENOUS
Status: DISCONTINUED | OUTPATIENT
Start: 2023-07-10 | End: 2023-07-10

## 2023-07-10 RX ORDER — PROPOFOL 10 MG/ML
VIAL (ML) INTRAVENOUS
Status: DISCONTINUED | OUTPATIENT
Start: 2023-07-10 | End: 2023-07-10

## 2023-07-10 RX ORDER — SODIUM CHLORIDE 9 MG/ML
INJECTION, SOLUTION INTRAVENOUS CONTINUOUS
Status: DISCONTINUED | OUTPATIENT
Start: 2023-07-10 | End: 2023-07-10 | Stop reason: HOSPADM

## 2023-07-10 RX ORDER — PROPOFOL 10 MG/ML
VIAL (ML) INTRAVENOUS CONTINUOUS PRN
Status: DISCONTINUED | OUTPATIENT
Start: 2023-07-10 | End: 2023-07-10

## 2023-07-10 RX ADMIN — LIDOCAINE HYDROCHLORIDE 50 MG: 20 INJECTION INTRAVENOUS at 01:07

## 2023-07-10 RX ADMIN — SODIUM CHLORIDE: 0.9 INJECTION, SOLUTION INTRAVENOUS at 01:07

## 2023-07-10 RX ADMIN — Medication 175 MCG/KG/MIN: at 01:07

## 2023-07-10 RX ADMIN — PROPOFOL 100 MG: 10 INJECTION, EMULSION INTRAVENOUS at 01:07

## 2023-07-10 RX ADMIN — PROPOFOL 25 MG: 10 INJECTION, EMULSION INTRAVENOUS at 01:07

## 2023-07-10 NOTE — H&P
Short Stay Endoscopy History and Physical    PCP - Bandar Michel MD    Procedure - Colonoscopy  ASA - 1  Mallampati - per anesthesia  History of Anesthesia problems - no  Family history Anesthesia problems -  no     HPI:  This is a 47 y.o. male here for evaluation of :     Average Risk Screening: yes  High risk screening: No  History of polyps: No  Anemia: No  Blood in stools: No  Diarrhea: No  Abdominal Pain: No    Review of Systems:  CONSTITUTIONAL: Denies weight change,  fatigue, fevers, chills, night sweats.  CARDIOVASCULAR: Denies chest pain, shortness of breath, orthopnea and edema.  RESPIRATORY: Denies cough, hemoptysis, dyspnea, and wheezing.  GI: See HPI.    Medical History:  Past Medical History:   Diagnosis Date    Allergic rhinitis, seasonal     Calcaneus fracture     Family history of colon cancer     High-density lipoprotein deficiency     Incomplete right bundle branch block     Meningitis     Rotator cuff injury     Vitamin D deficiency disease        Surgical History:   Past Surgical History:   Procedure Laterality Date    ADENOIDECTOMY      EYE SURGERY      TONSILLECTOMY         Family History:   Family History   Problem Relation Age of Onset    Cancer Mother         breast    Hyperlipidemia Father     Hypertension Father     Cancer Paternal Uncle         colon    Hyperlipidemia Maternal Grandmother     Hypertension Maternal Grandmother     Alcohol abuse Maternal Grandfather     Depression Paternal Grandmother     Cancer Paternal Grandfather         colon       Social History:   Social History     Tobacco Use    Smoking status: Never    Smokeless tobacco: Never   Substance Use Topics    Alcohol use: Yes     Alcohol/week: 0.0 standard drinks     Comment: social    Drug use: No       Allergies: Reviewed.    Medications:  No current facility-administered medications on file prior to encounter.     Current Outpatient Medications on File Prior to Encounter   Medication Sig Dispense Refill     ascorbic acid, vitamin C, (VITAMIN C) 100 MG tablet Take 1000 mg daily      azelastine (ASTELIN) 137 mcg (0.1 %) nasal spray 1 spray (137 mcg total) by Nasal route 2 (two) times daily. 30 mL 3    cholecalciferol, vitamin D3, (VITAMIN D3) 50 mcg (2,000 unit) Cap Take 1 capsule by mouth once daily.      ezetimibe (ZETIA) 10 mg tablet Take 1 tablet (10 mg total) by mouth once daily. 30 tablet 12    fish oil-omega-3 fatty acids 300-1,000 mg capsule Take 2 g by mouth once daily.      fluticasone propionate (FLONASE) 50 mcg/actuation nasal spray Use 1 spray (50 mcg total) by Each Nostril route 2 (two) times daily. 16 g 3    pilocarpine HCl (VUITY) 1.25 % Drop INSTILL 1 DROP INTO EACH EYE ONCE DAILY 2.5 mL 11    pilocarpine HCl (VUITY) 1.25 % Drop Instill 1 drop into both eyes once a day 2.5 mL 3    turmeric 400 mg Cap Take 1 capsule by mouth once daily.         Physical Exam:  Vital Signs:   Vitals:    07/10/23 1219   BP: (!) 145/75   Pulse: 80   Resp: 15   Temp: 98.2 °F (36.8 °C)     General Appearance: Well appearing in no acute distress  ENT: OP clear  Chest: CTA B  CV: RRR, no m/r/g  Abd: s/nt/nd/nabs  Ext: no edema    Labs:  Reviewed    IMPRESSION:    Screening    Plan:  I have explained the risks and benefits of colonoscopy to the patient including but not limited to bleeding, perforation, infection, and death. The patient wishes to proceed with colonoscopy.

## 2023-07-10 NOTE — ANESTHESIA POSTPROCEDURE EVALUATION
Anesthesia Post Evaluation    Patient: Ramon Carlos    Procedure(s) Performed: Procedure(s) (LRB):  COLONOSCOPY (N/A)    Final Anesthesia Type: general      Patient location during evaluation: PACU  Patient participation: Yes- Able to Participate  Level of consciousness: awake and alert and oriented  Pain management: adequate  Airway patency: patent    PONV status at discharge: No PONV  Anesthetic complications: no      Cardiovascular status: blood pressure returned to baseline and hemodynamically stable  Respiratory status: unassisted  Hydration status: euvolemic  Follow-up not needed.          Vitals Value Taken Time   /72 07/10/23 1417   Temp 36.6 °C (97.9 °F) 07/10/23 1347   Pulse 55 07/10/23 1417   Resp 17 07/10/23 1417   SpO2 98 % 07/10/23 1417         Event Time   Out of Recovery 14:21:36         Pain/Shayne Score: Shayne Score: 9 (7/10/2023  2:17 PM)

## 2023-07-10 NOTE — PROVATION PATIENT INSTRUCTIONS
Discharge Summary/Instructions after an Endoscopic Procedure  Patient Name: Ramon Carlos  Patient MRN: [id number]  Patient YOB: 1975  Monday, July 10, 2023  Laurent Terry MD  Dear patient,  As a result of recent federal legislation (The Federal Cures Act), you may   receive lab or pathology results from your procedure in your [facility]   account before your physician is able to contact you. Your physician or   their representative will relay the results to you with their   recommendations at their soonest availability.  Thank you,  RESTRICTIONS:  During your procedure today, you received medications for sedation.  These   medications may affect your judgment, balance and coordination.  Therefore,   for 24 hours, you have the following restrictions:   - DO NOT drive a car, operate machinery, make legal/financial decisions,   sign important papers or drink alcohol.    ACTIVITY:  Today: no heavy lifting, straining or running due to procedural   sedation/anesthesia.  The following day: return to full activity including work.  DIET:  Eat and drink normally unless instructed otherwise.     TREATMENT FOR COMMON SIDE EFFECTS:  - Mild abdominal pain, nausea, belching, bloating or excessive gas:  rest,   eat lightly and use a heating pad.  - Sore Throat: treat with throat lozenges and/or gargle with warm salt   water.  - Because air was used during the procedure, expelling large amounts of air   from your rectum or belching is normal.  - If a bowel prep was taken, you may not have a bowel movement for 1-3 days.    This is normal.  SYMPTOMS TO WATCH FOR AND REPORT TO YOUR PHYSICIAN:  1. Abdominal pain or bloating, other than gas cramps.  2. Chest pain.  3. Back pain.  4. Signs of infection such as: chills or fever occurring within 24 hours   after the procedure.  5. Rectal bleeding, which would show as bright red, maroon, or black stools.   (A tablespoon of blood from the rectum is not serious, especially  if   hemorrhoids are present.)  6. Vomiting.  7. Weakness or dizziness.  GO DIRECTLY TO THE NEAREST EMERGENCY ROOM IF YOU HAVE ANY OF THE FOLLOWING:      Difficulty breathing              Chills and/or fever over 101 F   Persistent vomiting and/or vomiting blood   Severe abdominal pain   Severe chest pain   Black, tarry stools   Bleeding- more than one tablespoon   Any other symptom or condition that you feel may need urgent attention  Your doctor recommends these additional instructions:  If any biopsies were taken, your doctors clinic will contact you in 1 to 2   weeks with any results.  - Discharge patient to home.   - Resume previous diet today.   - Continue present medications.   - Await pathology results.   - Repeat colonoscopy in 3 years for surveillance.   - Return to referring physician as previously scheduled.   - Patient has a contact number available for emergencies.  The signs and   symptoms of potential delayed complications were discussed with the   patient.  Return to normal activities tomorrow.  Written discharge   instructions were provided to the patient.  For questions, problems or results please call your physician - Laurent Terry MD at Work:  [phone].  [facility], EMERGENCY ROOM PHONE NUMBER: [phone]  IF A COMPLICATION OR EMERGENCY SITUATION ARISES AND YOU ARE UNABLE TO REACH   YOUR PHYSICIAN - GO DIRECTLY TO THE EMERGENCY ROOM.  Laurent Terry MD  7/10/2023 1:42:18 PM  This report has been verified and signed electronically.  Dear patient,  As a result of recent federal legislation (The Federal Cures Act), you may   receive lab or pathology results from your procedure in your [facility]   account before your physician is able to contact you. Your physician or   their representative will relay the results to you with their   recommendations at their soonest availability.  Thank you,  PROVATION

## 2023-07-10 NOTE — TRANSFER OF CARE
"Anesthesia Transfer of Care Note    Patient: Ramon Carlos    Procedure(s) Performed: Procedure(s) (LRB):  COLONOSCOPY (N/A)    Patient location: GI    Anesthesia Type: general    Transport from OR: Transported from OR on room air with adequate spontaneous ventilation    Post pain: adequate analgesia    Post assessment: no apparent anesthetic complications    Post vital signs: stable    Level of consciousness: awake    Nausea/Vomiting: no nausea/vomiting    Complications: none    Transfer of care protocol was followed      Last vitals:   Visit Vitals  BP (!) 145/75   Pulse 80   Temp 36.8 °C (98.2 °F)   Resp 15   Ht 5' 11" (1.803 m)   Wt 80.7 kg (178 lb)   SpO2 98%   BMI 24.83 kg/m²     "

## 2023-07-10 NOTE — ANESTHESIA PREPROCEDURE EVALUATION
07/10/2023  Ramon Carlos is a 47 y.o., male.  Past Medical History:   Diagnosis Date    Allergic rhinitis, seasonal     Calcaneus fracture     Family history of colon cancer     High-density lipoprotein deficiency     Incomplete right bundle branch block     Meningitis     Rotator cuff injury     Vitamin D deficiency disease      Past Surgical History:   Procedure Laterality Date    ADENOIDECTOMY      EYE SURGERY      TONSILLECTOMY         Pre-op Assessment    I have reviewed the Patient Summary Reports.     I have reviewed the Nursing Notes. I have reviewed the NPO Status.   I have reviewed the Medications.     Review of Systems  Anesthesia Hx:  No problems with previous Anesthesia  Denies Family Hx of Anesthesia complications.   Denies Personal Hx of Anesthesia complications.   Hematology/Oncology:  Hematology Normal   Oncology Normal     EENT/Dental:EENT/Dental Normal   Cardiovascular:   Dysrhythmias    Pulmonary:  Pulmonary Normal    Hepatic/GI:   Bowel Prep.    Musculoskeletal:  Musculoskeletal Normal    Neurological:  Neurology Normal    Endocrine:  Endocrine Normal    Dermatological:  Skin Normal    Psych:  Psychiatric Normal           Physical Exam  General: Well nourished    Airway:  Mallampati: II   Mouth Opening: Normal  TM Distance: Normal  Tongue: Normal  Neck ROM: Normal ROM    Dental:  Intact        Anesthesia Plan  Type of Anesthesia, risks & benefits discussed:    Anesthesia Type: Gen Natural Airway  Intra-op Monitoring Plan: Standard ASA Monitors  Informed Consent: Informed consent signed with the Patient and all parties understand the risks and agree with anesthesia plan.  All questions answered.   ASA Score: 2  Day of Surgery Review of History & Physical: H&P Update referred to the surgeon/provider.I have interviewed and examined the patient. I have reviewed the  patient's H&P dated: There are no significant changes.     Ready For Surgery From Anesthesia Perspective.     .

## 2023-07-12 LAB
FINAL PATHOLOGIC DIAGNOSIS: NORMAL
GROSS: NORMAL
Lab: NORMAL

## 2023-08-01 ENCOUNTER — CLINICAL SUPPORT (OUTPATIENT)
Dept: OTOLARYNGOLOGY | Facility: CLINIC | Age: 48
End: 2023-08-01
Payer: OTHER GOVERNMENT

## 2023-08-01 DIAGNOSIS — H90.3 SENSORINEURAL HEARING LOSS, BILATERAL: Primary | ICD-10-CM

## 2023-08-01 PROCEDURE — 99499 UNLISTED E&M SERVICE: CPT | Mod: S$PBB,,, | Performed by: AUDIOLOGIST

## 2023-08-01 PROCEDURE — 99499 NO LOS: ICD-10-PCS | Mod: S$PBB,,, | Performed by: AUDIOLOGIST

## 2023-08-01 NOTE — PROGRESS NOTES
Ramon Carlos was seen today in the clinic for a hearing aid follow up.  He reported that his left hearing aid  was broken in half.  His warranty ends 8/16/23 so I will send both hearing aids in for a clean/check and have them replace his left speaker.  He leaves this coming weekend for a month and will contact me when he gets back in town.  I will schedule him at that time for an annual audiogram and hearing aid follow up and do any hearing aid adjustments that are needed.  He reported he still has issues with his bluetooth connectivity so we will reconnect the hearing aids with his phone.  There is no charge for the in warranty repair.    Right hearing aid:              Phonak Audeo M90-R              SN [id number]              #2, med               P8 color              Large open dome              Warranty until 8/16/23     Left hearing aid:              Phonak Audeo M90-R              SN [id number]              #2, med               P8 color              Large open dome              Warranty until 8/16/23

## 2023-09-08 ENCOUNTER — PATIENT MESSAGE (OUTPATIENT)
Dept: OTOLARYNGOLOGY | Facility: CLINIC | Age: 48
End: 2023-09-08
Payer: OTHER GOVERNMENT

## 2023-09-11 ENCOUNTER — CLINICAL SUPPORT (OUTPATIENT)
Dept: OTOLARYNGOLOGY | Facility: CLINIC | Age: 48
End: 2023-09-11
Payer: OTHER GOVERNMENT

## 2023-09-11 DIAGNOSIS — H90.3 SENSORINEURAL HEARING LOSS, BILATERAL: Primary | ICD-10-CM

## 2023-09-11 PROCEDURE — 99999 PR PBB SHADOW E&M-EST. PATIENT-LVL I: ICD-10-PCS | Mod: PBBFAC,,, | Performed by: AUDIOLOGIST

## 2023-09-11 PROCEDURE — 92567 TYMPANOMETRY: CPT | Mod: PBBFAC,PN | Performed by: AUDIOLOGIST

## 2023-09-11 PROCEDURE — 99499 NO LOS: ICD-10-PCS | Mod: S$PBB,,, | Performed by: AUDIOLOGIST

## 2023-09-11 PROCEDURE — 92557 COMPREHENSIVE HEARING TEST: CPT | Mod: PBBFAC,PN | Performed by: AUDIOLOGIST

## 2023-09-11 PROCEDURE — 99211 OFF/OP EST MAY X REQ PHY/QHP: CPT | Mod: PBBFAC,PN | Performed by: AUDIOLOGIST

## 2023-09-11 PROCEDURE — 99999 PR PBB SHADOW E&M-EST. PATIENT-LVL I: CPT | Mod: PBBFAC,,, | Performed by: AUDIOLOGIST

## 2023-09-11 PROCEDURE — 99499 UNLISTED E&M SERVICE: CPT | Mod: S$PBB,,, | Performed by: AUDIOLOGIST

## 2023-09-11 NOTE — PROGRESS NOTES
Ramon Carlos was seen today in the clinic for a hearing aid follow up.  He picked up his hearing aids from repair and an updated hearing test was completed.  The hearing aids were reprogrammed to today's hearing test.  Real ear and feedback test was completed.  He reported good subjective benefit.  I checked the bluetooth connection to his phone and a phone call was successfully made.  We reinstalled his myPhonak and tried to pair the hearing aids to the berenice and it continues to only pair with the left hearing aid.  After much trouble shooting with [employer] technical support, we came to the conclusion that there was something going on with his phone/berenice because both hearing aids paired to the berenice on my phone.  I suggested that he contact [employer] for technical support with connecting to the berenice.  He will contact me with any further questions or concerns.    Right hearing aid:              Phonak Audeo M90-R              SN [id number]              #1, med               P8 color              Large open dome              Warranty until 8/16/23     Left hearing aid:              Phonak Audeo M90-R              SN [id number]              #1, med               P8 color              Large open dome              Warranty until 8/16/23

## 2023-09-11 NOTE — PROGRESS NOTES
Ramon Carlos was seen today in the clinic for an audiologic evaluation.  He reported a history of hearing loss and wears a pair of hearing aids purchased approximately 3 years ago at [facility].    Tympanometry revealed a Type A tympanogram for the left ear and a Type Ad tympanogram for the right ear.  Audiogram results revealed a moderate to severe high frequency sensorineural hearing loss for the right ear and a mild sloping to severe sensorineural hearing loss for the left ear.  Speech reception thresholds were obtained at 10dB for the right ear and 25dB for the left ear.  Speech discrimination scores were 100% for the right ear and 96% for the left ear.    Recommendations:  Otologic evaluation  Annual evaluation  Continue use of hearing aids  Hearing protection in noise

## 2023-10-03 ENCOUNTER — PATIENT MESSAGE (OUTPATIENT)
Dept: FAMILY MEDICINE | Facility: CLINIC | Age: 48
End: 2023-10-03
Payer: OTHER GOVERNMENT

## 2023-10-12 ENCOUNTER — OFFICE VISIT (OUTPATIENT)
Dept: PODIATRY | Facility: CLINIC | Age: 48
End: 2023-10-12
Payer: OTHER GOVERNMENT

## 2023-10-12 VITALS — HEIGHT: 71 IN | BODY MASS INDEX: 24.91 KG/M2 | WEIGHT: 177.94 LBS

## 2023-10-12 DIAGNOSIS — L60.0 INGROWN NAIL: Primary | ICD-10-CM

## 2023-10-12 DIAGNOSIS — M79.674 PAIN OF RIGHT GREAT TOE: ICD-10-CM

## 2023-10-12 PROCEDURE — 99999 PR PBB SHADOW E&M-EST. PATIENT-LVL III: CPT | Mod: PBBFAC,,, | Performed by: PODIATRIST

## 2023-10-12 PROCEDURE — 99999 PR PBB SHADOW E&M-EST. PATIENT-LVL III: ICD-10-PCS | Mod: PBBFAC,,, | Performed by: PODIATRIST

## 2023-10-12 PROCEDURE — 99203 OFFICE O/P NEW LOW 30 MIN: CPT | Mod: S$PBB,,, | Performed by: PODIATRIST

## 2023-10-12 PROCEDURE — 99213 OFFICE O/P EST LOW 20 MIN: CPT | Mod: PBBFAC,PO | Performed by: PODIATRIST

## 2023-10-12 PROCEDURE — 99203 PR OFFICE/OUTPT VISIT, NEW, LEVL III, 30-44 MIN: ICD-10-PCS | Mod: S$PBB,,, | Performed by: PODIATRIST

## 2023-10-14 NOTE — PROGRESS NOTES
Subjective:     Patient ID: Ramon Carlos is a 47 y.o. male.    Chief Complaint: Ingrown Toenail (Right great toenail)    Ramon is a 47 y.o. male who presents to the clinic complaining of painful ingrown toenail on the right foot.    Review of Systems   Constitutional: Negative for chills.   Cardiovascular:  Negative for chest pain and claudication.   Respiratory:  Negative for cough.    Skin:  Positive for color change, dry skin and nail changes.   Musculoskeletal:  Positive for joint pain.   Gastrointestinal:  Negative for nausea.   Neurological:  Positive for paresthesias. Negative for numbness.   Psychiatric/Behavioral:  The patient is not nervous/anxious.         Objective:     Physical Exam  Constitutional:       Appearance: He is well-developed.      Comments: Oriented to time, place, and person.   Cardiovascular:      Comments: DP and PT pulses are palpable bilaterally. 3 sec capillary refill time and toes and feet are warm to touch proximally .  There is  hair growth on the feet and toes b/l. There is no edema b/l. No spider veins or varicosities present b/l.     Musculoskeletal:      Comments: Equinus noted b/l ankles with < 10 deg DF noted. MMT 5/5 in DF/PF/Inv/Ev resistance with no reproduction of pain in any direction. Passive range of motion of ankle and pedal joints is painless b/l.     Feet:      Right foot:      Skin integrity: No callus or dry skin.      Left foot:      Skin integrity: No callus or dry skin.   Lymphadenopathy:      Comments: Negative lymphadenopathy bilateral popliteal fossa and tarsal tunnel.   Skin:     Comments: No open lesions, lacerations or wounds noted.Interdigital spaces clean, dry and intact b/l. No erythema noted to b/l foot.  Nails normal color and trophic qualities.    Medial  hallux nail margin of right  foot with ingrown nail plate. Surrounding erythema and minimal edema is noted.       Neurological:      Mental Status: He is alert.      Comments: Light  touch, proprioception, and sharp/dull sensation are all intact bilaterally. Protective threshold with the Ge[place]-Wienstein monofilament is intact bilaterally.    Psychiatric:         Behavior: Behavior is cooperative.           Assessment:      Encounter Diagnoses   Name Primary?    Ingrown nail Yes    Pain of right great toe      Plan:     Ramon was seen today for ingrown toenail.    Diagnoses and all orders for this visit:    Ingrown nail    Pain of right great toe      I counseled the patient on his conditions, their implications and medical management.      Discussed treatment options with patient. Options included soaking, avulsion and matrixectomy. Risks and benefits discussed and all questions were answered. The patient wishes to proceed with  Nail avulsion at a later date      In depth conversation on the treatment of ingrown nail; partial nail avulsion vs chemical matrixectomy vs conservative treatment of soaking and nail trimming    Utilizing sterile toenail clippers I aggressively trimmed  the offending right hallux medial   nail border approximately 3 mm from its edge and carried the nail plate incision down at an angle in order to wedge out the offending cryptotic portion of the nail plate. The offending border was then removed in total.     RTC PRN

## 2023-10-26 ENCOUNTER — PATIENT MESSAGE (OUTPATIENT)
Dept: PODIATRY | Facility: CLINIC | Age: 48
End: 2023-10-26

## 2023-10-26 ENCOUNTER — PROCEDURE VISIT (OUTPATIENT)
Dept: PODIATRY | Facility: CLINIC | Age: 48
End: 2023-10-26
Payer: OTHER GOVERNMENT

## 2023-10-26 VITALS
SYSTOLIC BLOOD PRESSURE: 136 MMHG | HEART RATE: 58 BPM | BODY MASS INDEX: 24.91 KG/M2 | DIASTOLIC BLOOD PRESSURE: 88 MMHG | HEIGHT: 71 IN | WEIGHT: 177.94 LBS

## 2023-10-26 DIAGNOSIS — L60.0 INGROWN NAIL: Primary | ICD-10-CM

## 2023-10-26 PROCEDURE — 11750 NAIL REMOVAL: ICD-10-PCS | Mod: T5,S$PBB,, | Performed by: PODIATRIST

## 2023-10-26 PROCEDURE — 11750 EXCISION NAIL&NAIL MATRIX: CPT | Mod: T5,PBBFAC,PO | Performed by: PODIATRIST

## 2023-10-26 NOTE — PROCEDURES
Nail Removal    Date/Time: 10/26/2023 2:45 PM    Performed by: Coco Henderson DPM  Authorized by: Coco Henderson DPM    Consent Done?:  Yes (Written)  Location:     Location:  Right foot    Location detail:  Right big toe  Anesthesia:     Anesthesia:  Digital block    Local anesthetic:  Lidocaine 1% without epinephrine and bupivacaine 0.25% without epinephrine    Anesthetic total (ml):  3  Procedure Details:     Preparation:  Skin prepped with alcohol and skin prepped with Betadine    Amount removed:  Partial    Side:  Medial    Wedge excision of skin of nail fold: No      Nail bed sutured?: No      Nail matrix removed:  Partial    Removal method:  Phenol and alcohol    Removed nail replaced and anchored: No      Dressing applied:  4x4, antibiotic ointment and gauze roll    Patient tolerance:  Patient tolerated the procedure well with no immediate complications

## 2023-11-09 ENCOUNTER — OFFICE VISIT (OUTPATIENT)
Dept: PODIATRY | Facility: CLINIC | Age: 48
End: 2023-11-09
Payer: OTHER GOVERNMENT

## 2023-11-09 DIAGNOSIS — Z98.890 S/P NAIL SURGERY: Primary | ICD-10-CM

## 2023-11-09 PROCEDURE — 99212 PR OFFICE/OUTPT VISIT, EST, LEVL II, 10-19 MIN: ICD-10-PCS | Mod: 95,,, | Performed by: PODIATRIST

## 2023-11-09 PROCEDURE — 99212 OFFICE O/P EST SF 10 MIN: CPT | Mod: 95,,, | Performed by: PODIATRIST

## 2023-11-09 NOTE — PROGRESS NOTES
The patient location is: home  The chief complaint leading to consultation is:  S/p phenol matrixectomy right hallux medial border    Visit type: audiovisual    Face to Face time with patient: 5  minutes of total time spent on the encounter, which includes face to face time and non-face to face time preparing to see the patient (eg, review of tests), Obtaining and/or reviewing separately obtained history, Documenting clinical information in the electronic or other health record, Independently interpreting results (not separately reported) and communicating results to the patient/family/caregiver, or Care coordination (not separately reported).         Each patient to whom he or she provides medical services by telemedicine is:  (1) informed of the relationship between the physician and patient and the respective role of any other health care provider with respect to management of the patient; and (2) notified that he or she may decline to receive medical services by telemedicine and may withdraw from such care at any time.    Notes:       SUBJECTIVE: Patient status post nail procedure.  Patient has no complaints of fever chills or sweats.  Minimal pain.  Patient has been dressing as instructed.    Physical examination: General: Pt. is in no acute distress, alert and oriented x 3.    Podiatric examination:Deferred virtual visit.     IMPRESSION: postop nail procedure with satisfactory progress no signs of infection.    PLAN: Patient to continue local care until completely healed with no drainage and no redness.  Patient should call the clinic immediately if any signs of infection such as fever chills sweats increased redness or pain but was otherwise discharged.

## 2023-12-13 ENCOUNTER — OFFICE VISIT (OUTPATIENT)
Dept: FAMILY MEDICINE | Facility: CLINIC | Age: 48
End: 2023-12-13
Payer: OTHER GOVERNMENT

## 2023-12-13 VITALS
WEIGHT: 186.31 LBS | BODY MASS INDEX: 26.08 KG/M2 | DIASTOLIC BLOOD PRESSURE: 80 MMHG | HEART RATE: 62 BPM | HEIGHT: 71 IN | OXYGEN SATURATION: 98 % | TEMPERATURE: 98 F | SYSTOLIC BLOOD PRESSURE: 126 MMHG

## 2023-12-13 DIAGNOSIS — E55.9 VITAMIN D DEFICIENCY DISEASE: ICD-10-CM

## 2023-12-13 DIAGNOSIS — Z87.438 HISTORY OF PROSTATITIS: ICD-10-CM

## 2023-12-13 DIAGNOSIS — Z86.010 HISTORY OF COLONIC POLYPS: ICD-10-CM

## 2023-12-13 DIAGNOSIS — J30.2 SEASONAL ALLERGIC RHINITIS, UNSPECIFIED TRIGGER: ICD-10-CM

## 2023-12-13 DIAGNOSIS — E78.6 HIGH-DENSITY LIPOPROTEIN DEFICIENCY: ICD-10-CM

## 2023-12-13 DIAGNOSIS — Z80.0 FAMILY HISTORY OF COLON CANCER: ICD-10-CM

## 2023-12-13 DIAGNOSIS — Z12.5 SCREENING FOR PROSTATE CANCER: ICD-10-CM

## 2023-12-13 DIAGNOSIS — Z00.00 ROUTINE MEDICAL EXAM: Primary | ICD-10-CM

## 2023-12-13 DIAGNOSIS — E78.5 HYPERLIPIDEMIA, UNSPECIFIED HYPERLIPIDEMIA TYPE: ICD-10-CM

## 2023-12-13 PROCEDURE — 99396 PREV VISIT EST AGE 40-64: CPT | Mod: S$PBB,,, | Performed by: INTERNAL MEDICINE

## 2023-12-13 PROCEDURE — 99999 PR PBB SHADOW E&M-EST. PATIENT-LVL III: ICD-10-PCS | Mod: PBBFAC,,, | Performed by: INTERNAL MEDICINE

## 2023-12-13 PROCEDURE — 99396 PR PREVENTIVE VISIT,EST,40-64: ICD-10-PCS | Mod: S$PBB,,, | Performed by: INTERNAL MEDICINE

## 2023-12-13 PROCEDURE — 99213 OFFICE O/P EST LOW 20 MIN: CPT | Mod: PBBFAC,PO | Performed by: INTERNAL MEDICINE

## 2023-12-13 PROCEDURE — 99999 PR PBB SHADOW E&M-EST. PATIENT-LVL III: CPT | Mod: PBBFAC,,, | Performed by: INTERNAL MEDICINE

## 2023-12-13 RX ORDER — EZETIMIBE 10 MG/1
10 TABLET ORAL DAILY
Qty: 90 TABLET | Refills: 12 | Status: SHIPPED | OUTPATIENT
Start: 2023-12-13 | End: 2024-01-27 | Stop reason: SDUPTHER

## 2023-12-13 RX ORDER — AZELASTINE 1 MG/ML
1 SPRAY, METERED NASAL 2 TIMES DAILY
Qty: 30 ML | Refills: 12 | Status: SHIPPED | OUTPATIENT
Start: 2023-12-13

## 2023-12-13 RX ORDER — FLUTICASONE PROPIONATE 50 MCG
1 SPRAY, SUSPENSION (ML) NASAL 2 TIMES DAILY
Qty: 16 G | Refills: 12 | Status: SHIPPED | OUTPATIENT
Start: 2023-12-13

## 2023-12-13 NOTE — PROGRESS NOTES
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  .  He has no cardiopulmonary symptoms.  He does have a strong family history of colon polyps and 7/23 colonoscopy w one 10mm polyp -3 yrs.   No first degree relatives with colon cancer better paternal grandfather had colon cancer at age 53 and an uncle with colon cancer.  His father and 2 uncles had colon polyps.     Patient does get some postnasal drip when he gets in the shower causing him to gag.  Otherwise some allergy symptoms when around cats now.   Continuing on his Allegra,Flonase and some Astelin and nasal saline especially trying nasal saline prior to shower.      Reviewed the history that in May of 2019 he likely had significant prostatitis.  Thinks it is possibly related to constipation at that time.  No hemorrhoid problems lately.  No prostatitis or other urinary symptoms lately.  He did have some acute right flank pain and was diagnosed with a kidney stone which eventually did past.  He had no residual stones we reviewed the two CT scans.  He did visit the issue with Urology.    Knee problems are better having discontinued doing martial arts.  He does use turmeric.  He continues on vitamin-D and we will reassess the vitamin-D deficiency.  He also takes 1000 a vitamin-C and drinks orange juice and maybe should run the tolerable amount of vitamin-C by the urologist in regards to kidney stones.    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  He is exercising at the gym five days per week and doing less running but plans to restart running.  LDL increase.  Wife was on a low carb diet and may have increased cholesterol intake.  He will make adjustments.   to 143 last year.      He has had an HDL deficiency in the past that may or may not be influenced by his exercise and we will reassess  obviously not exercising as much at this time.  We discussed his LDL elevation could well be genetic and we discussed the benefits of Crestor.  Apparently his father had a lot of myalgias on statin.  If indeed he was to develop that we discussed that we would do zetia and he is on it      ROS:   CONST: weight stable. EYES: no vision change. ENT: no sore throat. CV: no chest pain w/ exertion. RESP: no shortness of breath. GI: no nausea, vomiting, diarrhea. No dysphagia. : no urinary issues. MUSCULOSKELETAL: no new myalgias or arthralgias. SKIN: no new changes. NEURO: no focal deficits. PSYCH: no new issues. ENDOCRINE: no polyuria. HEME: no lymph nodes. ALLERGY: no general pruritis.    Past medical history:  1.  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  Hearing loss secondary to prior ear infections, now with hearing aids  3.  Anaphylaxis to penicillin when treated for meningitis  4.  Possible SVT or other tachyarrhythmia that responds to vagal maneuvers  5.  Incomplete right bundle-branch block on EKG  6.  Vitamin D deficiency  7.  Low HDL at 38  8.  Achilles tendinitis  9.  Chronic ALLERGIES and history of sinusitis, Dr. Reed  10.  Clinical prostatitis and jose juan PSA   -seen Roger  11. Kidney stone 3/2021  12.   colonoscopy w one 10mm polyp -3 yrs    Past surgical history:  1.  Tonsillectomy    Family history: Mother  at age 42 of breast cancer.  Father   of COPD exacerbation and had underlying heart disease by an angiogram.  Father had colon polyps and hypertension.  One sister with benign breast disease.  Colon polyps in father and an uncle and a paternal grandfather and a paternal uncle with colon cancer.      PSA 41 to 4 in past    Social history: Rare alcohol at home maybe once or twice a week.  Never smoked.   with children.  Was active in martial arts.  Works as a psychiatrist . MD in the [employer]    Vital signs as above  Gen: no distress  EYES: conjunctiva clear, non-icteric,  PERRL  ENT: nose clear, nasal mucosa normal, oropharynx clear and moist, teeth good, ear canals clear and tympanic membranes pearly  NECK:supple, thyroid non-palpable  RESP: effort is good, lungs clear  CV: heart RRR w/o murmur, gallops or rubs; no carotid bruits, no edema  GI: abdomen soft, non-distended, non-tender, no hepatosplenomegaly  MS: gait normal, no clubbing or cyanosis of the digits  SKIN: no rashes, warm to touch      Labs reviewed,  to 143    Diagnoses and all orders for this visit:    Routine medical exam, update blood work, up-to-date on colon screening  -     Hemoglobin A1C; Future  -     Comprehensive Metabolic Panel; Future  -     Vitamin D; Future  -     PSA, Screening; Future  -     Lipid Panel; Future  -     TSH; Future  -     CBC Auto Differential; Future    Screening for prostate cancer  -     PSA, Screening; Future    History of colonic polyps, three year follow-up recommended    Family history of colon cancer    Vitamin D deficiency disease, reassess  -     Vitamin D; Future    History of prostatitis    Hyperlipidemia, unspecified hyperlipidemia type, reassess on Zetia  -     Lipid Panel; Future    Seasonal allergic rhinitis, unspecified trigger, chronic and stable on meds    High-density lipoprotein deficiency, reassess    Other orders  -     ezetimibe (ZETIA) 10 mg tablet; Take 1 tablet (10 mg total) by mouth once daily.  -     azelastine (ASTELIN) 137 mcg (0.1 %) nasal spray; 1 spray (137 mcg total) by Nasal route 2 (two) times daily.  -     fluticasone propionate (FLONASE) 50 mcg/actuation nasal spray; Use 1 spray (50 mcg total) by Each Nostril route 2 (two) times daily.       Answers submitted by the patient for this visit:  Review of Systems Questionnaire (Submitted on 12/11/2023)  activity change: No  unexpected weight change: No  neck pain: No  hearing loss: No  rhinorrhea: No  trouble swallowing: No  eye discharge: No  visual disturbance: No  chest tightness: No  wheezing:  No  chest pain: No  palpitations: No  blood in stool: No  constipation: No  vomiting: No  diarrhea: No  polydipsia: No  polyuria: No  difficulty urinating: No  urgency: No  hematuria: No  joint swelling: No  arthralgias: No  headaches: No  weakness: No  confusion: No  dysphoric mood: No

## 2023-12-26 ENCOUNTER — LAB VISIT (OUTPATIENT)
Dept: LAB | Facility: HOSPITAL | Age: 48
End: 2023-12-26
Attending: INTERNAL MEDICINE
Payer: OTHER GOVERNMENT

## 2023-12-26 DIAGNOSIS — E78.5 HYPERLIPIDEMIA, UNSPECIFIED HYPERLIPIDEMIA TYPE: ICD-10-CM

## 2023-12-26 DIAGNOSIS — Z12.5 SCREENING FOR PROSTATE CANCER: ICD-10-CM

## 2023-12-26 DIAGNOSIS — E55.9 VITAMIN D DEFICIENCY DISEASE: ICD-10-CM

## 2023-12-26 DIAGNOSIS — Z00.00 ROUTINE MEDICAL EXAM: ICD-10-CM

## 2023-12-26 LAB
25(OH)D3+25(OH)D2 SERPL-MCNC: 52 NG/ML (ref 30–96)
ALBUMIN SERPL BCP-MCNC: 4.2 G/DL (ref 3.5–5.2)
ALP SERPL-CCNC: 55 U/L (ref 55–135)
ALT SERPL W/O P-5'-P-CCNC: 47 U/L (ref 10–44)
ANION GAP SERPL CALC-SCNC: 4 MMOL/L (ref 8–16)
AST SERPL-CCNC: 29 U/L (ref 10–40)
BASOPHILS # BLD AUTO: 0.04 K/UL (ref 0–0.2)
BASOPHILS NFR BLD: 0.8 % (ref 0–1.9)
BILIRUB SERPL-MCNC: 0.8 MG/DL (ref 0.1–1)
BUN SERPL-MCNC: 17 MG/DL (ref 6–20)
CALCIUM SERPL-MCNC: 9.3 MG/DL (ref 8.7–10.5)
CHLORIDE SERPL-SCNC: 108 MMOL/L (ref 95–110)
CHOLEST SERPL-MCNC: 224 MG/DL (ref 120–199)
CHOLEST/HDLC SERPL: 4.3 {RATIO} (ref 2–5)
CO2 SERPL-SCNC: 30 MMOL/L (ref 23–29)
COMPLEXED PSA SERPL-MCNC: 1.3 NG/ML (ref 0–4)
CREAT SERPL-MCNC: 1 MG/DL (ref 0.5–1.4)
DIFFERENTIAL METHOD: NORMAL
EOSINOPHIL # BLD AUTO: 0.1 K/UL (ref 0–0.5)
EOSINOPHIL NFR BLD: 2 % (ref 0–8)
ERYTHROCYTE [DISTWIDTH] IN BLOOD BY AUTOMATED COUNT: 12.1 % (ref 11.5–14.5)
EST. GFR  (NO RACE VARIABLE): >60 ML/MIN/1.73 M^2
ESTIMATED AVG GLUCOSE: 100 MG/DL (ref 68–131)
GLUCOSE SERPL-MCNC: 88 MG/DL (ref 70–110)
HBA1C MFR BLD: 5.1 % (ref 4–5.6)
HCT VFR BLD AUTO: 45.5 % (ref 40–54)
HDLC SERPL-MCNC: 52 MG/DL (ref 40–75)
HDLC SERPL: 23.2 % (ref 20–50)
HGB BLD-MCNC: 15.4 G/DL (ref 14–18)
IMM GRANULOCYTES # BLD AUTO: 0.02 K/UL (ref 0–0.04)
IMM GRANULOCYTES NFR BLD AUTO: 0.4 % (ref 0–0.5)
LDLC SERPL CALC-MCNC: 153 MG/DL (ref 63–159)
LYMPHOCYTES # BLD AUTO: 2.2 K/UL (ref 1–4.8)
LYMPHOCYTES NFR BLD: 44.7 % (ref 18–48)
MCH RBC QN AUTO: 29.1 PG (ref 27–31)
MCHC RBC AUTO-ENTMCNC: 33.8 G/DL (ref 32–36)
MCV RBC AUTO: 86 FL (ref 82–98)
MONOCYTES # BLD AUTO: 0.5 K/UL (ref 0.3–1)
MONOCYTES NFR BLD: 9.7 % (ref 4–15)
NEUTROPHILS # BLD AUTO: 2.1 K/UL (ref 1.8–7.7)
NEUTROPHILS NFR BLD: 42.4 % (ref 38–73)
NONHDLC SERPL-MCNC: 172 MG/DL
NRBC BLD-RTO: 0 /100 WBC
PLATELET # BLD AUTO: 190 K/UL (ref 150–450)
PMV BLD AUTO: 11.6 FL (ref 9.2–12.9)
POTASSIUM SERPL-SCNC: 4.6 MMOL/L (ref 3.5–5.1)
PROT SERPL-MCNC: 7.1 G/DL (ref 6–8.4)
RBC # BLD AUTO: 5.29 M/UL (ref 4.6–6.2)
SODIUM SERPL-SCNC: 142 MMOL/L (ref 136–145)
TRIGL SERPL-MCNC: 95 MG/DL (ref 30–150)
TSH SERPL DL<=0.005 MIU/L-ACNC: 2.08 UIU/ML (ref 0.4–4)
WBC # BLD AUTO: 4.97 K/UL (ref 3.9–12.7)

## 2023-12-26 PROCEDURE — 80053 COMPREHEN METABOLIC PANEL: CPT | Performed by: INTERNAL MEDICINE

## 2023-12-26 PROCEDURE — 83036 HEMOGLOBIN GLYCOSYLATED A1C: CPT | Performed by: INTERNAL MEDICINE

## 2023-12-26 PROCEDURE — 84153 ASSAY OF PSA TOTAL: CPT | Performed by: INTERNAL MEDICINE

## 2023-12-26 PROCEDURE — 85025 COMPLETE CBC W/AUTO DIFF WBC: CPT | Performed by: INTERNAL MEDICINE

## 2023-12-26 PROCEDURE — 80061 LIPID PANEL: CPT | Performed by: INTERNAL MEDICINE

## 2023-12-26 PROCEDURE — 82306 VITAMIN D 25 HYDROXY: CPT | Performed by: INTERNAL MEDICINE

## 2023-12-26 PROCEDURE — 36415 COLL VENOUS BLD VENIPUNCTURE: CPT | Mod: PO | Performed by: INTERNAL MEDICINE

## 2023-12-26 PROCEDURE — 84443 ASSAY THYROID STIM HORMONE: CPT | Performed by: INTERNAL MEDICINE

## 2023-12-27 ENCOUNTER — PATIENT MESSAGE (OUTPATIENT)
Dept: FAMILY MEDICINE | Facility: CLINIC | Age: 48
End: 2023-12-27
Payer: OTHER GOVERNMENT

## 2023-12-27 DIAGNOSIS — Z00.00 ROUTINE MEDICAL EXAM: Primary | ICD-10-CM

## 2023-12-27 DIAGNOSIS — E78.5 HYPERLIPIDEMIA, UNSPECIFIED HYPERLIPIDEMIA TYPE: Primary | ICD-10-CM

## 2023-12-27 NOTE — TELEPHONE ENCOUNTER
Bandar CLAUDIO,     Let me try the diet/exercise effort first. Being that I had my ingrown toenail, I hadnt been able to exercise as regular. I am back running again and this should help.     I am very hesitant about statins because it completely crippled my father. With the muscle wasting and weight loss, he was barely able to stand or get out of a chair.  He tried and failed 2-3 statins.     If you dont mind, place the order and I will shoot to repeat the test around late Feb/early March.     Thanks again.     Ramon

## 2024-01-28 NOTE — TELEPHONE ENCOUNTER
No care due was identified.  [facility] Embedded Care Due Messages. Reference number: [id number].   1/27/2024 8:00:25 PM CST

## 2024-01-29 ENCOUNTER — PATIENT MESSAGE (OUTPATIENT)
Dept: FAMILY MEDICINE | Facility: CLINIC | Age: 49
End: 2024-01-29
Payer: OTHER GOVERNMENT

## 2024-01-29 NOTE — TELEPHONE ENCOUNTER
No care due was identified.  [facility] Embedded Care Due Messages. Reference number: [id number].   1/29/2024 11:45:49 AM CST

## 2024-01-30 RX ORDER — EZETIMIBE 10 MG/1
10 TABLET ORAL DAILY
Qty: 90 TABLET | Refills: 12 | Status: SHIPPED | OUTPATIENT
Start: 2024-01-30

## 2024-01-30 RX ORDER — EZETIMIBE 10 MG/1
10 TABLET ORAL DAILY
Qty: 90 TABLET | Refills: 12 | Status: ON HOLD | OUTPATIENT
Start: 2024-01-30 | End: 2024-02-22 | Stop reason: HOSPADM

## 2024-02-05 ENCOUNTER — HOSPITAL ENCOUNTER (OUTPATIENT)
Dept: RADIOLOGY | Facility: HOSPITAL | Age: 49
Discharge: HOME | End: 2024-02-05
Attending: PHYSICIAN ASSISTANT
Payer: OTHER GOVERNMENT

## 2024-02-05 ENCOUNTER — OFFICE VISIT (OUTPATIENT)
Dept: SPORTS MEDICINE | Facility: CLINIC | Age: 49
End: 2024-02-05
Payer: OTHER GOVERNMENT

## 2024-02-05 VITALS
SYSTOLIC BLOOD PRESSURE: 137 MMHG | HEART RATE: 79 BPM | WEIGHT: 193.31 LBS | DIASTOLIC BLOOD PRESSURE: 83 MMHG | BODY MASS INDEX: 27.06 KG/M2 | HEIGHT: 71 IN

## 2024-02-05 DIAGNOSIS — M25.562 LEFT KNEE PAIN, UNSPECIFIED CHRONICITY: ICD-10-CM

## 2024-02-05 DIAGNOSIS — S83.512A RUPTURE OF ANTERIOR CRUCIATE LIGAMENT OF LEFT KNEE, INITIAL ENCOUNTER: Primary | ICD-10-CM

## 2024-02-05 DIAGNOSIS — M23.92 INTERNAL DERANGEMENT OF LEFT KNEE: ICD-10-CM

## 2024-02-05 DIAGNOSIS — M25.462 EFFUSION, LEFT KNEE: ICD-10-CM

## 2024-02-05 DIAGNOSIS — S83.412A SPRAIN OF MEDIAL COLLATERAL LIGAMENT OF LEFT KNEE, INITIAL ENCOUNTER: ICD-10-CM

## 2024-02-05 PROCEDURE — 73564 X-RAY EXAM KNEE 4 OR MORE: CPT | Mod: TC,50

## 2024-02-05 PROCEDURE — 73721 MRI JNT OF LWR EXTRE W/O DYE: CPT | Mod: TC,LT

## 2024-02-05 PROCEDURE — 73564 X-RAY EXAM KNEE 4 OR MORE: CPT | Mod: 26,50,, | Performed by: RADIOLOGY

## 2024-02-05 PROCEDURE — 73721 MRI JNT OF LWR EXTRE W/O DYE: CPT | Mod: 26,LT,, | Performed by: RADIOLOGY

## 2024-02-05 PROCEDURE — 99999 PR PBB SHADOW E&M-EST. PATIENT-LVL IV: CPT | Mod: PBBFAC,,, | Performed by: PHYSICIAN ASSISTANT

## 2024-02-05 PROCEDURE — 99204 OFFICE O/P NEW MOD 45 MIN: CPT | Mod: S$PBB,,, | Performed by: PHYSICIAN ASSISTANT

## 2024-02-05 PROCEDURE — 99214 OFFICE O/P EST MOD 30 MIN: CPT | Mod: PBBFAC,25 | Performed by: PHYSICIAN ASSISTANT

## 2024-02-05 RX ORDER — HYDROGEN PEROXIDE 3 %
20 SOLUTION, NON-ORAL MISCELLANEOUS
Qty: 30 CAPSULE | Refills: 11 | Status: ON HOLD | OUTPATIENT
Start: 2024-02-05 | End: 2024-02-22 | Stop reason: HOSPADM

## 2024-02-05 RX ORDER — CELECOXIB 200 MG/1
200 CAPSULE ORAL 2 TIMES DAILY WITH MEALS
Qty: 60 CAPSULE | Refills: 0 | Status: SHIPPED | OUTPATIENT
Start: 2024-02-05 | End: 2024-05-20 | Stop reason: SDUPTHER

## 2024-02-05 NOTE — PROGRESS NOTES
"Subjective:     Chief Complaint: Ramon Carlos is a 48 y.o. male who had concerns including Pain, Injury, and Swelling of the Left Knee.    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 The pain started 2 days ago while performing PFT, agility football drills, an opponent landed on his anterior knee and felt a "pop", with immediate pain, instability, swelling, and inability to bear weight. Pain is becoming progressively worse to walk. Pain is located over (points to) medial joint line, medial, and lateral knee. He reports that the pain is a 9 /10 sore, aching, and pain with movement pain today. Associated symptoms include swelling. Previous treatments include ED visit and given immobilizer and crutches. Pain is not responding adequately to conservative measures which have included activity modifications, rest, and oral medication. Is affecting ADLs and limiting desired level of activity. Denies numbness, tingling, radiation . Cannot bear weight today.  Pain is 10 /10 at its worst    Mechanical symptoms: locking and catching  Subjective instability: (+)   Worse with any movement and weightbearing  Better with rest.   Nocturnal symptoms: (+)    No previous surgeries or recent trauma on knee    Ambulating by : crutches and brace              Review of Systems   Constitutional: Negative for chills and fever.   HENT:  Negative for congestion and sore throat.    Eyes:  Negative for discharge and double vision.   Cardiovascular:  Negative for chest pain, palpitations and syncope.   Respiratory:  Negative for cough and shortness of breath.    Endocrine: Negative for cold intolerance and heat intolerance.   Skin:  Negative for dry skin and rash.   Musculoskeletal:  Positive for joint pain and joint swelling.   Gastrointestinal:  Negative for abdominal pain, nausea and vomiting.   Neurological:  Negative for focal weakness, numbness and paresthesias.       Pain Related " Questions  Over the past 3 days, what was your highest pain level?: 6  Over the past 3 days, what was your lowest pain level? : 6    Other  How many nights a week are you awakened by your affected body part?: 4  Was the patient's HEIGHT measured or patient reported?: Patient Reported  Was the patient's WEIGHT measured or patient reported?: Measured    Objective:     General: Ramon is well-developed, well-nourished, appears stated age, in no acute distress, alert and oriented to time, place and person.     General    Nursing note and vitals reviewed.  Constitutional: He is oriented to person, place, and time. He appears well-developed and well-nourished. No distress.   Eyes: Conjunctivae and EOM are normal. Pupils are equal, round, and reactive to light.   Neck: Neck supple. No JVD present.   Cardiovascular:  Normal heart sounds and intact distal pulses.            No murmur heard.  Pulmonary/Chest: Effort normal and breath sounds normal. No respiratory distress.   Abdominal: Soft. Bowel sounds are normal. He exhibits no distension. There is no abdominal tenderness.   Neurological: He is alert and oriented to person, place, and time. Coordination normal.   Psychiatric: He has a normal mood and affect. His behavior is normal. Judgment and thought content normal.     General Musculoskeletal Exam   Gait: abnormal and antalgic       Right Knee Exam     Inspection   Erythema: absent  Scars: absent  Swelling: absent  Effusion: absent  Deformity: absent  Bruising: absent    Tenderness   The patient is experiencing no tenderness.     Range of Motion   Extension:  0   Flexion:  130     Tests   Meniscus   Kim:  Medial - negative Lateral - negative  Ligament Examination   Lachman: normal (-1 to 2mm)   PCL-Posterior Drawer: normal (0 to 2mm)     MCL - Valgus: normal (0 to 2mm)  LCL - Varus: normal  Dial Test at 90 degrees: normal (< 5 degrees)  Posterolateral Corner: stable  Patella   Patellar Glide (quadrants): Lateral - 1    Medial - 2  Patellar Grind: negative    Other   Popliteal (Baker's) Cyst: absent  Sensation: normal    Left Knee Exam     Inspection   Erythema: absent  Scars: absent  Swelling: absent  Effusion: present (large)  Deformity: absent  Bruising: absent    Tenderness   The patient tender to palpation of the medial retinaculum, medial joint line and pes anserinus.    Range of Motion   Extension:  5 abnormal   Flexion:  50 abnormal     Tests   Meniscus   Kim:  Medial - positive Lateral - negative  Stability   Lachman: abnormal  - grade III  PCL-Posterior Drawer: normal (0 to 2mm)  MCL - Valgus: abnormal - grade III  LCL - Varus: normal (0 to 2mm)  Dial Test at 90 degrees: normal (< 5 degrees)  Posterolateral Corner: stable  Patella   Patellar Glide (Quadrants): Lateral - 1 Medial - 2  Patellar Grind: negative    Other   Popliteal (Baker's) Cyst: absent  Sensation: normal    Right Hip Exam     Tests   Dexter: negative  Left Hip Exam     Tests   Dexter: negative          Muscle Strength   Right Lower Extremity   Hip Abduction: 5/5   Quadriceps:  5/5   Hamstrin/5   Left Lower Extremity   Hip Abduction: 5/5   Quadriceps:  4/5   Hamstrin/5     Vascular Exam     Right Pulses  Dorsalis Pedis:      2+  Posterior Tibial:      2+        Left Pulses  Dorsalis Pedis:      2+  Posterior Tibial:      2+        Edema  Right Lower Leg: absent  Left Lower Leg: absent    Radiographic findings today:    Left knee:     Preserved bone density.  No acute fracture.  No narrowing of the medial or lateral tibiofemoral compartments.  No periarticular spurring about the tibiofemoral joint space.  Minimal spurring anterior superior patella.  Based on the lateral view, there is felt to be a suprapatellar bursal effusion evident but no definite prepatellar soft tissue swelling..  Based on the Merchant view, suggested widening of the medial patellofemoral joint space and mild lateral patella tilt.  Clinical correlation.     Right knee:      Preserved bone density.  No acute fractures.  No narrowing of medial or lateral tibiofemoral or patellofemoral articulations.  Minimal spurring anterior superior patella.  No suprapatellar bursal effusion or prepatellar soft tissue swelling.    Kellgren-Kiko Classification: 2    Xrays of the knees were ordered and reviewed by me today. These findings were discussed and reviewed with the patient.      Assessment:     Encounter Diagnoses   Name Primary?    Rupture of anterior cruciate ligament of left knee, initial encounter Yes    Internal derangement of left knee     Left knee pain, unspecified chronicity     Effusion, left knee     Sprain of medial collateral ligament of left knee, initial encounter         Plan:     We have discussed a variety of treatment options including medications, injections, physical therapy and other alternative treatments. I also explained the indications, risks and benefits of surgery. Given the patient's hx and examination, we should proceed with MRI at this time. Pt agrees with treatment plan.    I made the decision to obtain old records of the patient including previous notes and imaging. I independently reviewed and interpreted lab results today as well as prior imaging.     1. MRI left knee to assess for ACL, MCL, PCL, meniscus and cartilage pathology.    2. Ice compress to the affected area 2-3x a day for 15-20 minutes as needed for pain management.  3. Ambulatory referral to physical therapy for [facility] Maria R Santillan,   4. Celebrex 200 mg twice daily PRN for pain management.  5. [id number] Ale Newsome performed a custom orthotic / brace adjustment, fitting and training with the patient. The patient demonstrated understanding and proper care. This was performed for 15 minutes.    - T scope   - reparal sleeve  6. RTC to see Herve Mejia PA-C for follow-up and virtual MRI results.    All of the patient's questions were answered and the patient will contact us if they have  any questions or concerns in the interim.      Patient questionnaires may have been collected.

## 2024-02-07 ENCOUNTER — OFFICE VISIT (OUTPATIENT)
Dept: SPORTS MEDICINE | Facility: CLINIC | Age: 49
End: 2024-02-07
Payer: OTHER GOVERNMENT

## 2024-02-07 DIAGNOSIS — S83.512A RUPTURE OF ANTERIOR CRUCIATE LIGAMENT OF LEFT KNEE, INITIAL ENCOUNTER: Primary | ICD-10-CM

## 2024-02-07 DIAGNOSIS — M25.462 EFFUSION, LEFT KNEE: ICD-10-CM

## 2024-02-07 DIAGNOSIS — M23.92 INTERNAL DERANGEMENT OF LEFT KNEE: ICD-10-CM

## 2024-02-07 DIAGNOSIS — S83.412A SPRAIN OF MEDIAL COLLATERAL LIGAMENT OF LEFT KNEE, INITIAL ENCOUNTER: ICD-10-CM

## 2024-02-07 PROCEDURE — 99212 OFFICE O/P EST SF 10 MIN: CPT | Mod: 95,,, | Performed by: PHYSICIAN ASSISTANT

## 2024-02-07 NOTE — PROGRESS NOTES
Call patient to discuss MRI results.  He has started physical therapy to improve his range of motion.     Results for orders placed during the hospital encounter of 02/05/24    MRI Knee Without Contrast Left    Narrative  EXAMINATION:  MRI KNEE WITHOUT CONTRAST LEFT    CLINICAL HISTORY:  Knee trauma, internal derangement suspected, xray done;ACL, MCL pathology;Pain in left knee    TECHNIQUE:  Multiplanar, multisequence images were performed about the knee.    COMPARISON:  Radiograph 02/05/2024    FINDINGS:  **MENISCI:    Medial meniscus: Vertical abnormal signal of the posterior horn extending to the tibial articular surface may be small vertical tear (series 8, image 8-9) with more complex internal signal intensity identified within the posterior horn.    Lateral meniscus: Complex tear of the posterior horn with radial component best identified axial image 21 coronal image 7.    Meniscal root attachment: Intact.    Popliteal hiatus, superior and inferior meniscal fascicles: Lateral superior and inferior meniscal fascicles are not well visualized likely torn.    **LIGAMENTS:    Anterior cruciate ligament: Torn at the proximal femoral attachment.    Posterior cruciate ligament: Continuous, with normal signal.    Medial collateral ligament: Laxity of the MCL with periligamentous edema and nonvisualized distal attachment consistent with complete tear.  A Stener type lesion identified distally as the fibers of the MCL appear superficial to the pes anserine.    Lateral collateral ligament and  biceps femoris: Biceps femoris is intact.  Thickening and Indistinctness of the proximal attachment of the LCL concerning for grade 1/2 sprain.    Iliotibial band (ITB): No evidence for ITB syndrome.    Popliteus tendon and popliteofibular ligament: Abnormal signal intensity at the level the popliteus insertion consistent with grade 1-2 injury with laxity/indistinctness of the popliteofibular ligament consistent with  tear..    **TENDONS:    Quadriceps tendon: Intact.    Patella tendon: Intact.    Joint fluid: Large joint effusion with synovitis.  Small Baker's cyst.    Hoffa's fat pad: Normal.    Intact medial retinaculum/ MPFL    Intact lateral retinaculum    **CARTILAGE:    Patellofemoral:Preserved medial and  lateral patellar facet cartilage.Preserved trochlear cartilage.    Medial tibiofemoral: Articular cartilage is preserved without focal defects or subchondral marrow edema.Internal aspect of medial femoral condyle cartilage is intact.    Lateral tibiofemoral: Articular cartilage is preserved without focal defects.  Anterior weight-bearing portion subchondral edema with mild overlying cartilage irregularity.Cartilage at posterior medial aspect of lateral tibial plateau is intact.    **OTHER:    Bone marrow: Posterior medial and lateral tibial marginal marrow edema, with anterolateral femoral condylar edema consistent with pivot shift contusions.  No fracture or marrow replacing process.    Miscellaneous: Posterior medial and posterior lateral corners of the knee are intact.The gastrocnemius muscles are normal.No evident plica thickening.    Impression  1. ACL tear at the femoral attachment.  2. MCL complete tear Grade 3 at the distal tibial attachment.  Stener-like lesion of the MCL injury suspect as the MCL appears superficial to the pes anserine.  3. LCL/popliteus tendon grade 1/2 sprain proximally with tear at the level of the popliteofibular ligament, findings consistent with posterolateral corner injury.  Recommend clinical examination for potential posterolateral instability.  4. Complex tear of the lateral meniscus posterior horn with radial component.  Suspected small vertical tear of the medial meniscus posterior horn.  5. Large joint effusion with synovitis.    Electronically signed by resident: Jens Alarcon  Date:    02/05/2024  Time:    12:58    Electronically signed by: Narayan Garcia  MD  Date:    02/05/2024  Time:    15:48       1. MRI results reviewed today and discussed with Paolo Gibbons MD. We reviewed with Ramon Carlos today, the pathology and natural history of his diagnosis. We have discussed a variety of treatment options including medications, physical therapy and other alternative treatments. I also explained the indications, risks and benefits of surgery. After discussion, he decided to proceed with surgery. The decision was made to go forward with       left Knee:     1. 22826 Arthroscopy, Anterior Cruciate Ligament Reconstruction with BTB autograft  2. 07485 Ligament knee reconstruction, extra-articular MCL with allograft  3. 38741 Arthroscopy, with meniscus repair (medial OR lateral)  4. 74609 Arthroscopy, debridement/shaving of articular cartilage (Chondroplasty)  5. 69092 Arthroscopy, knee, synovectomy, limited     Clearance Medically Necessary: x No     He was negative on dial test at 90 and 30, and was not TTP.    Patient is healthy and does not need clearance for procedure.    The details of the surgical procedure were explained, including the location of probable incisions and a description of likely hardware and/or grafts to be used.  The patient understands the likely convalescence after surgery.  Also, we have thoroughly discussed the risks, benefits and alternatives to surgery, including, but not limited to, the risk of infection, joint stiffness, blood clot (including DVT and/or pulmonary embolus), neurologic and vascular injury.  It was explained that, if tissue has been repaired or reconstructed, there is a chance of failure, which may require further management.    I made the decision to obtain old records of the patient including previous notes and imaging. I independently reviewed and interpreted lab results today as well as prior imaging.     2. Ice compress to the affected area 2-3x a day for 15-20 minutes as needed for pain management.  3. Ambulatory  referral to physical therapy for pre-habilitation.    All of the patient's questions were answered and the patient will contact us if they have any questions or concerns in the interim.

## 2024-02-09 DIAGNOSIS — S83.282A ACUTE LATERAL MENISCUS TEAR OF LEFT KNEE, INITIAL ENCOUNTER: ICD-10-CM

## 2024-02-09 DIAGNOSIS — S83.242A ACUTE MEDIAL MENISCUS TEAR OF LEFT KNEE, INITIAL ENCOUNTER: ICD-10-CM

## 2024-02-09 DIAGNOSIS — S83.512A RUPTURE OF ANTERIOR CRUCIATE LIGAMENT OF LEFT KNEE, INITIAL ENCOUNTER: Primary | ICD-10-CM

## 2024-02-09 DIAGNOSIS — S83.412A SPRAIN OF MEDIAL COLLATERAL LIGAMENT OF LEFT KNEE, INITIAL ENCOUNTER: ICD-10-CM

## 2024-02-14 ENCOUNTER — PATIENT MESSAGE (OUTPATIENT)
Dept: PREADMISSION TESTING | Facility: HOSPITAL | Age: 49
End: 2024-02-14
Payer: OTHER GOVERNMENT

## 2024-02-14 ENCOUNTER — OFFICE VISIT (OUTPATIENT)
Dept: SPORTS MEDICINE | Facility: CLINIC | Age: 49
End: 2024-02-14
Payer: OTHER GOVERNMENT

## 2024-02-14 VITALS
HEART RATE: 73 BPM | HEIGHT: 71 IN | BODY MASS INDEX: 25.9 KG/M2 | WEIGHT: 185 LBS | SYSTOLIC BLOOD PRESSURE: 122 MMHG | DIASTOLIC BLOOD PRESSURE: 78 MMHG

## 2024-02-14 DIAGNOSIS — S83.282A ACUTE LATERAL MENISCUS TEAR OF LEFT KNEE, INITIAL ENCOUNTER: ICD-10-CM

## 2024-02-14 DIAGNOSIS — S83.412A SPRAIN OF MEDIAL COLLATERAL LIGAMENT OF LEFT KNEE, INITIAL ENCOUNTER: ICD-10-CM

## 2024-02-14 DIAGNOSIS — S83.512A RUPTURE OF ANTERIOR CRUCIATE LIGAMENT OF LEFT KNEE, INITIAL ENCOUNTER: Primary | ICD-10-CM

## 2024-02-14 PROCEDURE — 99999 PR PBB SHADOW E&M-EST. PATIENT-LVL IV: CPT | Mod: PBBFAC,,, | Performed by: PHYSICIAN ASSISTANT

## 2024-02-14 PROCEDURE — 99214 OFFICE O/P EST MOD 30 MIN: CPT | Mod: PBBFAC | Performed by: PHYSICIAN ASSISTANT

## 2024-02-14 PROCEDURE — 99024 POSTOP FOLLOW-UP VISIT: CPT | Mod: ,,, | Performed by: PHYSICIAN ASSISTANT

## 2024-02-14 RX ORDER — SODIUM CHLORIDE 0.9 % (FLUSH) 0.9 %
10 SYRINGE (ML) INJECTION CONTINUOUS
Status: CANCELLED | OUTPATIENT
Start: 2024-02-14

## 2024-02-14 RX ORDER — PROMETHAZINE HYDROCHLORIDE 25 MG/1
25 TABLET ORAL EVERY 4 HOURS PRN
Qty: 30 TABLET | Refills: 0 | Status: SHIPPED | OUTPATIENT
Start: 2024-02-14 | End: 2024-04-22

## 2024-02-14 RX ORDER — OXYCODONE HYDROCHLORIDE 5 MG/1
TABLET ORAL
Qty: 8 TABLET | Refills: 0 | Status: SHIPPED | OUTPATIENT
Start: 2024-02-14 | End: 2024-04-22

## 2024-02-14 RX ORDER — CELECOXIB 200 MG/1
200 CAPSULE ORAL 2 TIMES DAILY WITH MEALS
Qty: 60 CAPSULE | Refills: 0 | Status: SHIPPED | OUTPATIENT
Start: 2024-02-14 | End: 2024-05-20 | Stop reason: SDUPTHER

## 2024-02-14 RX ORDER — ASPIRIN 325 MG
TABLET ORAL
Qty: 42 TABLET | Refills: 0 | Status: SHIPPED | OUTPATIENT
Start: 2024-02-14

## 2024-02-14 RX ORDER — METHOCARBAMOL 500 MG/1
500 TABLET, FILM COATED ORAL 3 TIMES DAILY PRN
Qty: 40 TABLET | Refills: 0 | Status: SHIPPED | OUTPATIENT
Start: 2024-02-14 | End: 2024-04-22

## 2024-02-14 RX ORDER — MUPIROCIN 20 MG/G
OINTMENT TOPICAL
Status: CANCELLED | OUTPATIENT
Start: 2024-02-14

## 2024-02-14 RX ORDER — PREGABALIN 75 MG/1
75 CAPSULE ORAL 2 TIMES DAILY
Qty: 60 CAPSULE | Refills: 0 | Status: SHIPPED | OUTPATIENT
Start: 2024-02-14 | End: 2024-04-22

## 2024-02-14 RX ORDER — ROPIVACAINE/EPI/CLONIDINE/KET 2.46-0.005
SYRINGE (ML) INJECTION ONCE
Status: CANCELLED | OUTPATIENT
Start: 2024-02-14 | End: 2024-02-14

## 2024-02-14 NOTE — H&P (VIEW-ONLY)
Ramon Carlos  is here for a completion of his perioperative paperwork. he  Is scheduled to undergo left Knee:     1. 29888 Arthroscopy, Anterior Cruciate Ligament Reconstruction with BTB autograft - [employer] Yulissa Screw fixation  2. 27427 Ligament knee reconstruction, extra-articular MCL with Tibialis Anterior Allograft with Biobrace augmentation - [employer] Anchors  3. 71745 Arthroscopy, with meniscus repair (medial OR lateral) - [employer]  4. 92692 Arthroscopy, debridement/shaving of articular cartilage (Chondroplasty)  5. 28347 Arthroscopy, knee, synovectomy, limited on 2/22/2024.      He is a healthy individual and does not need clearance for this procedure.     Risks, indications and benefits of the surgical procedure were discussed with the patient. All questions with regard to surgery, rehab, expected return to functional activities, activities of daily living and recreational endeavors were answered to his satisfaction.    Discussed COVID-19 with the patient, they are aware of our current policies and procedures, were given the option of delaying surgery, and they elect to proceed.    Patient was informed and understands the risks of surgery are greater for patients with a current condition or history of heart disease, obesity, clotting disorders, recurrent infections, steroid use, current or past smoking, and factors such as sedentary lifestyle and noncompliance with medications, therapy or follow-up. The degree of the increased risk is hard to estimate with any degree of precision.    Once no other questions were asked, a brief history and physical exam was then performed.    PAST MEDICAL HISTORY:   Past Medical History:   Diagnosis Date    Allergic rhinitis, seasonal     Calcaneus fracture     Family history of colon cancer     High-density lipoprotein deficiency     Incomplete right bundle branch block     Meningitis     Rotator cuff injury     Vitamin D deficiency disease      PAST SURGICAL HISTORY:    Past Surgical History:   Procedure Laterality Date    ADENOIDECTOMY      COLONOSCOPY N/A 7/10/2023    Procedure: COLONOSCOPY;  Surgeon: Laurnet Terry MD;  Location: [facility] ([address] [place] [address]);  Service: Endoscopy;  Laterality: N/A;  Ref by Andre Gudino, instr via portal - PC\  7/5/23- Precall confirmed- [place]    EYE SURGERY      TONSILLECTOMY       FAMILY HISTORY:   Family History   Problem Relation Age of Onset    Cancer Mother         breast    Hyperlipidemia Father     Hypertension Father     Cancer Paternal Uncle         colon    Hyperlipidemia Maternal Grandmother     Hypertension Maternal Grandmother     Alcohol abuse Maternal Grandfather     Depression Paternal Grandmother     Cancer Paternal Grandfather         colon     SOCIAL HISTORY:   Social History     Socioeconomic History    Marital status:    Tobacco Use    Smoking status: Never    Smokeless tobacco: Never   Substance and Sexual Activity    Alcohol use: Yes     Alcohol/week: 0.0 standard drinks of alcohol     Comment: social    Drug use: No     Social Determinants of Health     Financial Resource Strain: Low Risk  (12/11/2023)    Overall Financial Resource Strain (CARDIA)     Difficulty of Paying Living Expenses: Not hard at all   Food Insecurity: No Food Insecurity (12/11/2023)    Hunger Vital Sign     Worried About Running Out of Food in the Last Year: Never true     Ran Out of Food in the Last Year: Never true   Transportation Needs: No Transportation Needs (12/11/2023)    PRAPARE - Transportation     Lack of Transportation (Medical): No     Lack of Transportation (Non-Medical): No   Physical Activity: Sufficiently Active (12/11/2023)    Exercise Vital Sign     Days of Exercise per Week: 5 days     Minutes of Exercise per Session: 60 min   Stress: No Stress Concern Present (12/11/2023)    [employer] [place] [employer] - Occupational Stress Questionnaire     Feeling of Stress : Not at all   Social Connections: Unknown  (12/11/2023)    Social Connection and Isolation Panel [NHANES]     Frequency of Communication with Friends and Family: Three times a week     Frequency of Social Gatherings with Friends and Family: Twice a week     Active Member of Clubs or Organizations: Yes     Attends Club or Organization Meetings: More than 4 times per year     Marital Status:    Housing Stability: Low Risk  (12/11/2023)    Housing Stability Vital Sign     Unable to Pay for Housing in the Last Year: No     Number of Places Lived in the Last Year: 1     Unstable Housing in the Last Year: No       MEDICATIONS:   Current Outpatient Medications:     ascorbic acid, vitamin C, (VITAMIN C) 100 MG tablet, Take 1000 mg daily, Disp: , Rfl:     azelastine (ASTELIN) 137 mcg (0.1 %) nasal spray, 1 spray (137 mcg total) by Nasal route 2 (two) times daily., Disp: 30 mL, Rfl: 12    celecoxib (CELEBREX) 200 MG capsule, Take 1 capsule (200 mg total) by mouth 2 (two) times daily with meals. (breakfast and dinner), Disp: 60 capsule, Rfl: 0    cholecalciferol, vitamin D3, (VITAMIN D3) 50 mcg (2,000 unit) Cap, Take 1 capsule by mouth once daily., Disp: , Rfl:     ezetimibe (ZETIA) 10 mg tablet, Take 1 tablet (10 mg total) by mouth once daily., Disp: 90 tablet, Rfl: 12    ezetimibe (ZETIA) 10 mg tablet, Take 1 tablet (10 mg total) by mouth once daily., Disp: 90 tablet, Rfl: 12    fish oil-omega-3 fatty acids 300-1,000 mg capsule, Take 2 g by mouth once daily., Disp: , Rfl:     fluticasone propionate (FLONASE) 50 mcg/actuation nasal spray, Use 1 spray (50 mcg total) by Each Nostril route 2 (two) times daily., Disp: 16 g, Rfl: 12    pilocarpine HCl (VUITY) 1.25 % Drop, Instill 1 drop into both eyes once a day, Disp: 2.5 mL, Rfl: 3    turmeric 400 mg Cap, Take 1 capsule by mouth once daily., Disp: , Rfl:     esomeprazole (NEXIUM) 20 MG capsule, Take 1 capsule (20 mg total) by mouth before breakfast. (Patient not taking: Reported on 2/14/2024), Disp: 30 capsule,  Rfl: 11    pilocarpine HCl (VUITY) 1.25 % Drop, INSTILL 1 DROP INTO EACH EYE ONCE DAILY, Disp: 2.5 mL, Rfl: 11  ALLERGIES:   Review of patient's allergies indicates:   Allergen Reactions    Codeine     Pcn [penicillins]     Sulfa (sulfonamide antibiotics)        Review of Systems   Constitution: Negative. Negative for chills, fever and night sweats.   HENT: Negative for congestion and headaches.    Eyes: Negative for blurred vision, left vision loss and right vision loss.   Cardiovascular: Negative for chest pain and syncope.   Respiratory: Negative for cough and shortness of breath.    Endocrine: Negative for polydipsia, polyphagia and polyuria.   Hematologic/Lymphatic: Negative for bleeding problem. Does not bruise/bleed easily.   Skin: Negative for dry skin, itching and rash.   Musculoskeletal: Negative for falls and muscle weakness.   Gastrointestinal: Negative for abdominal pain and bowel incontinence.   Genitourinary: Negative for bladder incontinence and nocturia.   Neurological: Negative for disturbances in coordination, loss of balance and seizures.   Psychiatric/Behavioral: Negative for depression. The patient does not have insomnia.    Allergic/Immunologic: Negative for hives and persistent infections.     PHYSICAL EXAM:  GEN: A&Ox3, WD WN NAD  HEENT: WNL  CHEST: CTAB, no W/R/R  HEART: RRR, no M/R/G   ABD: Soft, NT ND, BS x4 QUADS  MS: Refer to previous note for detailed MS exam  NEURO: CN II-XII intact       The surgical consent was then reviewed with the patient, who agreed with all the contents of the consent form and it was signed. he was instructed to wait for a phone call from the anesthesia department prior to surgery to discuss past medical history, medications, and clearance. Also, informed he may be required to get additional testing per the anesthesia department prior to having surgery.     PHYSICAL THERAPY:  He was also instructed regarding physical therapy and will begin POD # 1-3. He was  given a copy of the original prescription to schedule. Another copy of this prescription was also faxed to [facility].    POST OP CARE:instructions were reviewed including care of the wound and dressing after surgery and when he can shower.     PAIN MANAGEMENT: Ramon Carlos was also given a pain management regime, which includes the TENS unit given to him by our DME team, along with the education required for its use. He was also instructed regarding the ice wrap that will be in place after surgery and his postoperative pain medications.     PAIN MEDICATION:  Roxicodone 5mg 1-2 po q 4-6 hours prn pain  Phenergan 25 mg one p.o. q.4-6 hours p.r.n. nausea and vomiting.  Celebrex 200 mg BID  Robaxin 500mg TID PRN  Lyrica 75mg BID  Aspirin 325mg daily x 6 weeks for DVT prophylaxis starting on the evening after surgery.    Post op meds sent to his personal pharmacy     Patient was instructed not to take benzodiazepines with opioid and Lyrica during the perioperative period. Patient denies history of seizures.     Patient will also use bilateral TEDs on lower extremities, SCDs during surgery, and early ambulation post-op. If the patient was previously taking 81mg baby aspirin, they may have been told not to hold for procedure.     Patient was also told to buy over the counter Prilosec medication and take it once daily for GI protection as long as they are taking NSAIDs or Aspirin.    DVT prophylaxis was discussed with the patient today including risk factors for developing DVTs and history of DVTs. The patient was asked if any specific recommendations were given from the doctor/s that did pre-operative surgical clearance. I may have prescribed a mechanical DVT prophylaxis device, , for the above listed patient, to reduce the risk for clot formation and complications that can arise from a DVT. In my professional medical opinion, I consider this medically necessary and have prescribed the device for  the purpose of postoperative treatment and rehabilitation. This can treat both the acute and chronic aspects of the inflammatory reaction that accompanies trauma and surgery. Additionally, I am prescribing mechanical DVT prophylaxis because the patient will have restricted mobility post-operatively and is at high risk for DVT. Failure to approve this device will compromise the outcome of this patient's healing process.     Patient was asked if they were taking or using OCP pills or devices. If they answered yes, then they were instructed to stop using OCPs at this pre-operative appointment until 2 months post-op to help prevent DVT development. They understand that there are other forms of birth control that do not involve hormones. They expressed understanding that ignoring/not following this instruction could result in a DVT which could turn into a deadly pulmonary embolism.      The patient was told that narcotic pain medications may make them drowsy and instructions were given to not sign legal documents, drive or operate heavy machinery, cars, or equipment while under the influence of narcotic medications.     Dr. Gibbons was present in clinic during this pre-op evaluation. The patient was offered the opportunity to ask Dr. Gibbons any further questions regarding the procedure which may not have been addressed during their previous informed consent discussion. The patient has declined to see Dr. Gibbons today.    As there were no other questions to be asked, he was given my business card along with Dr. Gibbons business card if he has any questions or concerns prior to surgery or in the postop period.

## 2024-02-14 NOTE — H&P
Ramon Carlos  is here for a completion of his perioperative paperwork. he  Is scheduled to undergo left Knee:     1. 29888 Arthroscopy, Anterior Cruciate Ligament Reconstruction with BTB autograft - Mitek Yulissa Screw fixation  2. 27427 Ligament knee reconstruction, extra-articular MCL with Tibialis Anterior Allograft with Biobrace augmentation - Mitek Anchors  3. 21708 Arthroscopy, with meniscus repair (medial OR lateral) - Truespan  4. 91629 Arthroscopy, debridement/shaving of articular cartilage (Chondroplasty)  5. 31257 Arthroscopy, knee, synovectomy, limited on 2/22/2024.      He is a healthy individual and does not need clearance for this procedure.     Risks, indications and benefits of the surgical procedure were discussed with the patient. All questions with regard to surgery, rehab, expected return to functional activities, activities of daily living and recreational endeavors were answered to his satisfaction.    Discussed COVID-19 with the patient, they are aware of our current policies and procedures, were given the option of delaying surgery, and they elect to proceed.    Patient was informed and understands the risks of surgery are greater for patients with a current condition or history of heart disease, obesity, clotting disorders, recurrent infections, steroid use, current or past smoking, and factors such as sedentary lifestyle and noncompliance with medications, therapy or follow-up. The degree of the increased risk is hard to estimate with any degree of precision.    Once no other questions were asked, a brief history and physical exam was then performed.    PAST MEDICAL HISTORY:   Past Medical History:   Diagnosis Date    Allergic rhinitis, seasonal     Calcaneus fracture     Family history of colon cancer     High-density lipoprotein deficiency     Incomplete right bundle branch block     Meningitis     Rotator cuff injury     Vitamin D deficiency disease      PAST SURGICAL HISTORY:    Past Surgical History:   Procedure Laterality Date    ADENOIDECTOMY      COLONOSCOPY N/A 7/10/2023    Procedure: COLONOSCOPY;  Surgeon: Laurent Terry MD;  Location: [facility] ([address] [place] [address]);  Service: Endoscopy;  Laterality: N/A;  Ref by Andre Gudino, instr via portal - PC\  7/5/23- Precall confirmed- [place]    EYE SURGERY      TONSILLECTOMY       FAMILY HISTORY:   Family History   Problem Relation Age of Onset    Cancer Mother         breast    Hyperlipidemia Father     Hypertension Father     Cancer Paternal Uncle         colon    Hyperlipidemia Maternal Grandmother     Hypertension Maternal Grandmother     Alcohol abuse Maternal Grandfather     Depression Paternal Grandmother     Cancer Paternal Grandfather         colon     SOCIAL HISTORY:   Social History     Socioeconomic History    Marital status:    Tobacco Use    Smoking status: Never    Smokeless tobacco: Never   Substance and Sexual Activity    Alcohol use: Yes     Alcohol/week: 0.0 standard drinks of alcohol     Comment: social    Drug use: No     Social Determinants of Health     Financial Resource Strain: Low Risk  (12/11/2023)    Overall Financial Resource Strain (CARDIA)     Difficulty of Paying Living Expenses: Not hard at all   Food Insecurity: No Food Insecurity (12/11/2023)    Hunger Vital Sign     Worried About Running Out of Food in the Last Year: Never true     Ran Out of Food in the Last Year: Never true   Transportation Needs: No Transportation Needs (12/11/2023)    PRAPARE - Transportation     Lack of Transportation (Medical): No     Lack of Transportation (Non-Medical): No   Physical Activity: Sufficiently Active (12/11/2023)    Exercise Vital Sign     Days of Exercise per Week: 5 days     Minutes of Exercise per Session: 60 min   Stress: No Stress Concern Present (12/11/2023)    [employer] [place] [employer] - Occupational Stress Questionnaire     Feeling of Stress : Not at all   Social Connections: Unknown  (12/11/2023)    Social Connection and Isolation Panel [NHANES]     Frequency of Communication with Friends and Family: Three times a week     Frequency of Social Gatherings with Friends and Family: Twice a week     Active Member of Clubs or Organizations: Yes     Attends Club or Organization Meetings: More than 4 times per year     Marital Status:    Housing Stability: Low Risk  (12/11/2023)    Housing Stability Vital Sign     Unable to Pay for Housing in the Last Year: No     Number of Places Lived in the Last Year: 1     Unstable Housing in the Last Year: No       MEDICATIONS:   Current Outpatient Medications:     ascorbic acid, vitamin C, (VITAMIN C) 100 MG tablet, Take 1000 mg daily, Disp: , Rfl:     azelastine (ASTELIN) 137 mcg (0.1 %) nasal spray, 1 spray (137 mcg total) by Nasal route 2 (two) times daily., Disp: 30 mL, Rfl: 12    celecoxib (CELEBREX) 200 MG capsule, Take 1 capsule (200 mg total) by mouth 2 (two) times daily with meals. (breakfast and dinner), Disp: 60 capsule, Rfl: 0    cholecalciferol, vitamin D3, (VITAMIN D3) 50 mcg (2,000 unit) Cap, Take 1 capsule by mouth once daily., Disp: , Rfl:     ezetimibe (ZETIA) 10 mg tablet, Take 1 tablet (10 mg total) by mouth once daily., Disp: 90 tablet, Rfl: 12    ezetimibe (ZETIA) 10 mg tablet, Take 1 tablet (10 mg total) by mouth once daily., Disp: 90 tablet, Rfl: 12    fish oil-omega-3 fatty acids 300-1,000 mg capsule, Take 2 g by mouth once daily., Disp: , Rfl:     fluticasone propionate (FLONASE) 50 mcg/actuation nasal spray, Use 1 spray (50 mcg total) by Each Nostril route 2 (two) times daily., Disp: 16 g, Rfl: 12    pilocarpine HCl (VUITY) 1.25 % Drop, Instill 1 drop into both eyes once a day, Disp: 2.5 mL, Rfl: 3    turmeric 400 mg Cap, Take 1 capsule by mouth once daily., Disp: , Rfl:     esomeprazole (NEXIUM) 20 MG capsule, Take 1 capsule (20 mg total) by mouth before breakfast. (Patient not taking: Reported on 2/14/2024), Disp: 30 capsule,  Rfl: 11    pilocarpine HCl (VUITY) 1.25 % Drop, INSTILL 1 DROP INTO EACH EYE ONCE DAILY, Disp: 2.5 mL, Rfl: 11  ALLERGIES:   Review of patient's allergies indicates:   Allergen Reactions    Codeine     Pcn [penicillins]     Sulfa (sulfonamide antibiotics)        Review of Systems   Constitution: Negative. Negative for chills, fever and night sweats.   HENT: Negative for congestion and headaches.    Eyes: Negative for blurred vision, left vision loss and right vision loss.   Cardiovascular: Negative for chest pain and syncope.   Respiratory: Negative for cough and shortness of breath.    Endocrine: Negative for polydipsia, polyphagia and polyuria.   Hematologic/Lymphatic: Negative for bleeding problem. Does not bruise/bleed easily.   Skin: Negative for dry skin, itching and rash.   Musculoskeletal: Negative for falls and muscle weakness.   Gastrointestinal: Negative for abdominal pain and bowel incontinence.   Genitourinary: Negative for bladder incontinence and nocturia.   Neurological: Negative for disturbances in coordination, loss of balance and seizures.   Psychiatric/Behavioral: Negative for depression. The patient does not have insomnia.    Allergic/Immunologic: Negative for hives and persistent infections.     PHYSICAL EXAM:  GEN: A&Ox3, WD WN NAD  HEENT: WNL  CHEST: CTAB, no W/R/R  HEART: RRR, no M/R/G   ABD: Soft, NT ND, BS x4 QUADS  MS: Refer to previous note for detailed MS exam  NEURO: CN II-XII intact       The surgical consent was then reviewed with the patient, who agreed with all the contents of the consent form and it was signed. he was instructed to wait for a phone call from the anesthesia department prior to surgery to discuss past medical history, medications, and clearance. Also, informed he may be required to get additional testing per the anesthesia department prior to having surgery.     PHYSICAL THERAPY:  He was also instructed regarding physical therapy and will begin POD # 1-3. He was  given a copy of the original prescription to schedule. Another copy of this prescription was also faxed to [facility].    POST OP CARE:instructions were reviewed including care of the wound and dressing after surgery and when he can shower.     PAIN MANAGEMENT: Ramon Carlos was also given a pain management regime, which includes the TENS unit given to him by our DME team, along with the education required for its use. He was also instructed regarding the ice wrap that will be in place after surgery and his postoperative pain medications.     PAIN MEDICATION:  Roxicodone 5mg 1-2 po q 4-6 hours prn pain  Phenergan 25 mg one p.o. q.4-6 hours p.r.n. nausea and vomiting.  Celebrex 200 mg BID  Robaxin 500mg TID PRN  Lyrica 75mg BID  Aspirin 325mg daily x 6 weeks for DVT prophylaxis starting on the evening after surgery.    Post op meds sent to his personal pharmacy     Patient was instructed not to take benzodiazepines with opioid and Lyrica during the perioperative period. Patient denies history of seizures.     Patient will also use bilateral TEDs on lower extremities, SCDs during surgery, and early ambulation post-op. If the patient was previously taking 81mg baby aspirin, they may have been told not to hold for procedure.     Patient was also told to buy over the counter Prilosec medication and take it once daily for GI protection as long as they are taking NSAIDs or Aspirin.    DVT prophylaxis was discussed with the patient today including risk factors for developing DVTs and history of DVTs. The patient was asked if any specific recommendations were given from the doctor/s that did pre-operative surgical clearance. I may have prescribed a mechanical DVT prophylaxis device, , for the above listed patient, to reduce the risk for clot formation and complications that can arise from a DVT. In my professional medical opinion, I consider this medically necessary and have prescribed the device for  the purpose of postoperative treatment and rehabilitation. This can treat both the acute and chronic aspects of the inflammatory reaction that accompanies trauma and surgery. Additionally, I am prescribing mechanical DVT prophylaxis because the patient will have restricted mobility post-operatively and is at high risk for DVT. Failure to approve this device will compromise the outcome of this patient's healing process.     Patient was asked if they were taking or using OCP pills or devices. If they answered yes, then they were instructed to stop using OCPs at this pre-operative appointment until 2 months post-op to help prevent DVT development. They understand that there are other forms of birth control that do not involve hormones. They expressed understanding that ignoring/not following this instruction could result in a DVT which could turn into a deadly pulmonary embolism.      The patient was told that narcotic pain medications may make them drowsy and instructions were given to not sign legal documents, drive or operate heavy machinery, cars, or equipment while under the influence of narcotic medications.     Dr. Gibbons was present in clinic during this pre-op evaluation. The patient was offered the opportunity to ask Dr. Gibbons any further questions regarding the procedure which may not have been addressed during their previous informed consent discussion. The patient has declined to see Dr. Gibbons today.    As there were no other questions to be asked, he was given my business card along with Dr. Gibbons business card if he has any questions or concerns prior to surgery or in the postop period.

## 2024-02-15 ENCOUNTER — PATIENT MESSAGE (OUTPATIENT)
Dept: FAMILY MEDICINE | Facility: CLINIC | Age: 49
End: 2024-02-15
Payer: OTHER GOVERNMENT

## 2024-02-15 ENCOUNTER — TELEPHONE (OUTPATIENT)
Dept: FAMILY MEDICINE | Facility: CLINIC | Age: 49
End: 2024-02-15
Payer: OTHER GOVERNMENT

## 2024-02-15 NOTE — ANESTHESIA PAT ROS NOTE
02/15/2024  Ramon Carlos is a 48 y.o., male.      Pre-op Assessment    I have reviewed the Patient Summary Reports.       I have reviewed the Medications.     Review of Systems  Anesthesia Hx:  No problems with previous Anesthesia   History of prior surgery of interest to airway management or planning:  Previous anesthesia: MAC       7/10/2023  colonoscopy with MAC.  Procedure performed at an [facility].    Denies Personal Hx of Anesthesia complications.                    Social:  Non-Smoker, No Alcohol Use       Hematology/Oncology:    Oncology Normal    -- Denies Anemia:               Hematology Comments: Vitamin D deficiency disease                    EENT/Dental:  chronic allergic rhinitis Seasonal allergies, H/O T&A, eye surgery, Hearing loss secondary to prior ear infections, now with hearing aids          Cardiovascular:  Exercise tolerance: good       Denies CAD.    Dysrhythmias   Denies Angina.     hyperlipidemia  Denies TAN.   High-density lipoprotein deficiency, incomplete Right BBB,  Possible SVT or other tachyarrhythmia that responds to vagal maneuvers                             Pulmonary:  Pulmonary Normal    Denies Asthma.   Denies Shortness of breath.                  Renal/:   Denies Chronic Renal Disease. renal calculi  History of prostatitis             Hepatic/GI:      Denies GERD. Denies Liver Disease.  Family history of colon cancer          Musculoskeletal:     Rupture of anterior cruciate ligament of left knee,   Sprain of medial collateral ligament of left knee,  Acute lateral meniscus tear of left knee,   Acute medial meniscus tear of left knee,  H/O Calcaneous fracture,  Rotator cuff injury            Neurological:    Denies CVA.    Denies Headaches. Denies Seizures.    H/O Meningitis at 1 yr old                            Endocrine:  Endocrine Normal Denies  Nursing notes reviewed and accepted.    Jose Rafael Ann is a 7 year old male who presents for well child exam.  Patient presents with Mother.    Concerns raised today include: none  Maybe mild allergy sx lately- a bit 'hoarse'.  No real congestion/sneezing/ cough.    SOCIAL:  Primary caretakers: mom and dad  Siblings: none  School: 2nd grade  Concerns for school performance: None  Concerns for behavior: None  Interests: swimming    DEVELOPMENT:  7-10 YEAR MILESTONES:, states phone number, home address, has close friends and reading and math at grade level    Birth history, medical history, surgical history, and family history reviewed and updated.  Past Medical History:   Diagnosis Date   • PPS (peripheral pulmonic stenosis) 2013   • Recurrent acute otitis media of both ears 2013   • Specified congenital anomaly of lacrimal passages 2013     Current Outpatient Medications   Medication Sig Dispense Refill   • ibuprofen (MOTRIN) 100 MG/5ML suspension Take by mouth every 8 hours as needed for Fever.     • acetaminophen (TYLENOL) 100 MG/ML solution Take 10 mg/kg by mouth every 4 hours as needed for Fever. 15 mL 0     No current facility-administered medications for this visit.      ALLERGIES:  No Known Allergies  ROS: no n/v/d; no rashes    PHYSICAL EXAM:  Blood pressure 117/76, pulse 93, height 4' 3.5\" (1.308 m), weight 30.3 kg.  GENERAL:  Well appearing  male, nontoxic, no acute distress.  Alert and interactive.  SKIN: Warm, normal turgor.  No cyanosis.  No bruises or lesions.  HEAD:  Normocephalic, atraumatic.   EYES:  Conjunctivae without injection or icterus.  Pupils equal, round, reactive to light; extraocular movements intact.  NOSE: No flaring.  Nasal mucosa pale, boggy B/L with mild edema  EARS:  Tympanic membranes transparent with good landmarks except tympanosclerosis  THROAT:  Oropharynx with moist mucous membranes and no lesions.  NECK:  Supple, no lymphadenopathy or masses.  HEART:   Diabetes. Denies Hypothyroidism.          Psych:  Psychiatric Normal                   Past Medical History:   Diagnosis Date    Allergic rhinitis, seasonal     Calcaneus fracture     Family history of colon cancer     High-density lipoprotein deficiency     Incomplete right bundle branch block     Meningitis     Rotator cuff injury     Vitamin D deficiency disease      Past Surgical History:   Procedure Laterality Date    ADENOIDECTOMY      COLONOSCOPY N/A 7/10/2023    Procedure: COLONOSCOPY;  Surgeon: Laurent Terry MD;  Location: [facility] ([address] [place] [address]);  Service: Endoscopy;  Laterality: N/A;  Ref by Andre Gudino, [facility] via portal - PC\  7/5/23- Precall confirmed- [place]    EYE SURGERY      TONSILLECTOMY         Anesthesia Assessment: Preoperative EQUATION    Planned Procedure: Procedure(s) (LRB):  RECONSTRUCTION, KNEE, ACL, ARTHROSCOPIC (Left)  RECONSTRUCTION, LIGAMENT, MCL (Left)  ARTHROSCOPY,KNEE,WITH MENISCUS REPAIR (Left)  ARTHROSCOPY, KNEE, WITH CHONDROPLASTY (Left)  SYNOVECTOMY, KNEE, LIMITED (Left)  Requested Anesthesia Type:General  Surgeon: Paolo Gibbons MD  Service: Orthopedics  Known or anticipated Date of Surgery:2/22/2024    Surgeon notes: reviewed    Electronic QUestionnaire Assessment completed via nurse interview with patient.        Triage considerations:     The patient has no apparent active cardiac condition (No unstable coronary Syndrome such as severe unstable angina or recent [<1 month] myocardial infarction, decompensated CHF, severe valvular   disease or significant arrhythmia)    Previous anesthesia records:MAC and No problems    Last [facility]   Subspecialty notes: n/a    Other important co-morbidities: HLD       EKG 12/24/2015:  Vent. Rate : 057 BPM     Atrial Rate : 057 BPM      P-R Int : 150 ms          QRS Dur : 108 ms       QT Int : 392 ms       P-R-T Axes : 046 -07 044 degrees      QTc Int : 381 ms   Sinus bradycardia   Otherwise  Regular rate and rhythm.  Quiet precordium.  Normal S1, S2.  +1-2/6 short systolic high pitched murmur c/w VSD; no clicks, rubs, gallops.   LUNGS:  Clear to auscultation bilaterally.  No wheezes, rales, rhonchi.  Normal work of breathing.  ABDOMEN:  Soft, nontender.  No organomegaly or masses.  GENITOURINARY:  Carlos 1 male with testes descended B/L  EXTREMITIES:  Warm, dry, without abnormalities.  NEUROLOGIC:  Normal tone, bulk, strength.    ASSESSMENT:  7 year old male well child with normal growth and development  1-VSD still audible on exam- per pediatric cardiology 2 years ago ok to monitor clinically, no rechecks.  I will continue to monitor.    2-AR- start zyrtec or claritin 10 mg daily due to mild sx currently.  Hasn't needed albuterol other than 'a couple times per year' calin with URI or dust.  Will monitor closely.    PLAN:    All parental concerns and questions discussed.  Anticipatory guidance provided, handout given.              Safety/car/bicycle/fire/sharp objects/falls/water              Development              Discipline              Diet              Television              Analgesics/antipyretics              Sun exposure              Tobacco-free home              Dental care              Lead exposure risk: none                Immunizations per orders- Flu.  Risks, benefits, and side effects discussed.  Return to clinic in 1 year for well child examination or sooner prn illness/concerns.               normal ECG   When compared with ECG of 10-DEC-2015 14:57,   No significant change was found   Confirmed by Sourav Renteria MD ([id number]) on 12/30/2015 10:04:29 AM        Tests already available:  Results have been reviewed.             Instructions given. (See in Nurse's note)      Optimization:  Anesthesia Preop Clinic Assessment Not Indicated    Medical Opinion Indicated: No       Sub-specialist consult indicated: N/A      Plan:    Consultation: Medical clearance is not requested.      Patient  has previously scheduled Medical Appointment with PCP: 12/13/2024.    Navigation: No additional tests Scheduled.              Consults scheduled: N/A              No tests, anesthesia preop clinic visit, or consult required.                         Patient is OK to proceed with surgery at [facility].       Ht: 5'11  Wt: 185 lb  BMI: 25.80  Vaccinated

## 2024-02-15 NOTE — TELEPHONE ENCOUNTER
----- Message from Paolo Gibbons MD sent at 2/8/2024  9:38 AM CST -----  left Knee:     1. 17868 Arthroscopy, Anterior Cruciate Ligament Reconstruction with BTB autograft - Mitek Yulissa Screw fixation  2. 43855 Ligament knee reconstruction, extra-articular MCL with Tibialis Anterior Allograft with Biobrace augmentation - Mitek Anchors  3. 79631 Arthroscopy, with meniscus repair (medial OR lateral) - Truespan  4. 62680 Arthroscopy, debridement/shaving of articular cartilage (Chondroplasty)   5. 59584 Arthroscopy, knee, synovectomy, limited     Clearance Medically Necessary: x No     He was negative on dial test at 90 and 30, and was not TTP.

## 2024-02-21 ENCOUNTER — ANESTHESIA EVENT (OUTPATIENT)
Dept: SURGERY | Facility: HOSPITAL | Age: 49
End: 2024-02-21
Payer: OTHER GOVERNMENT

## 2024-02-21 ENCOUNTER — TELEPHONE (OUTPATIENT)
Dept: SPORTS MEDICINE | Facility: CLINIC | Age: 49
End: 2024-02-21
Payer: OTHER GOVERNMENT

## 2024-02-21 NOTE — TELEPHONE ENCOUNTER
Spoke with patient and gave him arrival time for surgery tomorrow 7:30am     Jennifer   Clinical & Surgical Assistant to Dr. Paolo Gibbons

## 2024-02-22 ENCOUNTER — ANESTHESIA (OUTPATIENT)
Dept: SURGERY | Facility: HOSPITAL | Age: 49
End: 2024-02-22
Payer: OTHER GOVERNMENT

## 2024-02-22 ENCOUNTER — HOSPITAL ENCOUNTER (OUTPATIENT)
Facility: HOSPITAL | Age: 49
Discharge: HOME | End: 2024-02-22
Attending: ORTHOPAEDIC SURGERY | Admitting: ORTHOPAEDIC SURGERY
Payer: OTHER GOVERNMENT

## 2024-02-22 VITALS
HEIGHT: 71 IN | TEMPERATURE: 98 F | RESPIRATION RATE: 18 BRPM | HEART RATE: 63 BPM | DIASTOLIC BLOOD PRESSURE: 82 MMHG | OXYGEN SATURATION: 96 % | SYSTOLIC BLOOD PRESSURE: 137 MMHG | WEIGHT: 185 LBS | BODY MASS INDEX: 25.9 KG/M2

## 2024-02-22 DIAGNOSIS — S83.512A RUPTURE OF ANTERIOR CRUCIATE LIGAMENT OF LEFT KNEE, INITIAL ENCOUNTER: Primary | ICD-10-CM

## 2024-02-22 DIAGNOSIS — S83.282A ACUTE LATERAL MENISCUS TEAR OF LEFT KNEE, INITIAL ENCOUNTER: ICD-10-CM

## 2024-02-22 DIAGNOSIS — S83.412A SPRAIN OF MEDIAL COLLATERAL LIGAMENT OF LEFT KNEE, INITIAL ENCOUNTER: ICD-10-CM

## 2024-02-22 PROCEDURE — 63600175 PHARM REV CODE 636 W HCPCS: Performed by: PHYSICIAN ASSISTANT

## 2024-02-22 PROCEDURE — 37000009 HC ANESTHESIA EA ADD 15 MINS: Performed by: ORTHOPAEDIC SURGERY

## 2024-02-22 PROCEDURE — 25000003 PHARM REV CODE 250: Performed by: NURSE ANESTHETIST, CERTIFIED REGISTERED

## 2024-02-22 PROCEDURE — D9220A PRA ANESTHESIA: Mod: CRNA,,, | Performed by: NURSE ANESTHETIST, CERTIFIED REGISTERED

## 2024-02-22 PROCEDURE — 64448 NJX AA&/STRD FEM NRV NFS IMG: CPT | Mod: 59,LT,, | Performed by: ANESTHESIOLOGY

## 2024-02-22 PROCEDURE — 27427 RECONSTRUCTION KNEE: CPT | Mod: 51,22,LT, | Performed by: ORTHOPAEDIC SURGERY

## 2024-02-22 PROCEDURE — C1713 ANCHOR/SCREW BN/BN,TIS/BN: HCPCS | Performed by: ORTHOPAEDIC SURGERY

## 2024-02-22 PROCEDURE — 36000711: Performed by: ORTHOPAEDIC SURGERY

## 2024-02-22 PROCEDURE — 01400 ANES OPN/ARTHRS KNEE JT NOS: CPT | Performed by: ORTHOPAEDIC SURGERY

## 2024-02-22 PROCEDURE — 63600175 PHARM REV CODE 636 W HCPCS: Performed by: ANESTHESIOLOGY

## 2024-02-22 PROCEDURE — 37000008 HC ANESTHESIA 1ST 15 MINUTES: Performed by: ORTHOPAEDIC SURGERY

## 2024-02-22 PROCEDURE — D9220A PRA ANESTHESIA: Mod: ANES,,, | Performed by: STUDENT IN AN ORGANIZED HEALTH CARE EDUCATION/TRAINING PROGRAM

## 2024-02-22 PROCEDURE — 27201423 OPTIME MED/SURG SUP & DEVICES STERILE SUPPLY: Performed by: ORTHOPAEDIC SURGERY

## 2024-02-22 PROCEDURE — 29888 ARTHRS AID ACL RPR/AGMNTJ: CPT | Mod: 59,22,LT, | Performed by: ORTHOPAEDIC SURGERY

## 2024-02-22 PROCEDURE — C1769 GUIDE WIRE: HCPCS | Performed by: ORTHOPAEDIC SURGERY

## 2024-02-22 PROCEDURE — 29883 ARTHRS KNE SRG MNISC RPR M&L: CPT | Mod: 51,22,LT, | Performed by: ORTHOPAEDIC SURGERY

## 2024-02-22 PROCEDURE — 25000003 PHARM REV CODE 250: Performed by: ORTHOPAEDIC SURGERY

## 2024-02-22 PROCEDURE — C1762 CONN TISS, HUMAN(INC FASCIA): HCPCS | Performed by: ORTHOPAEDIC SURGERY

## 2024-02-22 PROCEDURE — 25000003 PHARM REV CODE 250: Performed by: ANESTHESIOLOGY

## 2024-02-22 PROCEDURE — 99900035 HC TECH TIME PER 15 MIN (STAT)

## 2024-02-22 PROCEDURE — 71000015 HC POSTOP RECOV 1ST HR: Performed by: ORTHOPAEDIC SURGERY

## 2024-02-22 PROCEDURE — 94761 N-INVAS EAR/PLS OXIMETRY MLT: CPT

## 2024-02-22 PROCEDURE — 63600175 PHARM REV CODE 636 W HCPCS: Performed by: NURSE ANESTHETIST, CERTIFIED REGISTERED

## 2024-02-22 PROCEDURE — 71000039 HC RECOVERY, EACH ADD'L HOUR: Performed by: ORTHOPAEDIC SURGERY

## 2024-02-22 PROCEDURE — 25000003 PHARM REV CODE 250: Performed by: PHYSICIAN ASSISTANT

## 2024-02-22 PROCEDURE — 64448 NJX AA&/STRD FEM NRV NFS IMG: CPT | Mod: 59,LT | Performed by: STUDENT IN AN ORGANIZED HEALTH CARE EDUCATION/TRAINING PROGRAM

## 2024-02-22 PROCEDURE — 71000033 HC RECOVERY, INTIAL HOUR: Performed by: ORTHOPAEDIC SURGERY

## 2024-02-22 PROCEDURE — 27800903 OPTIME MED/SURG SUP & DEVICES OTHER IMPLANTS: Performed by: ORTHOPAEDIC SURGERY

## 2024-02-22 PROCEDURE — 63600175 PHARM REV CODE 636 W HCPCS: Performed by: ORTHOPAEDIC SURGERY

## 2024-02-22 PROCEDURE — 36000710: Performed by: ORTHOPAEDIC SURGERY

## 2024-02-22 DEVICE — IMPLANTABLE DEVICE: Type: IMPLANTABLE DEVICE | Site: KNEE | Status: FUNCTIONAL

## 2024-02-22 DEVICE — HEALIX ADVANCE BR 3 SUTURE ANCHOR W/DYNACORD TCP/PLGA ABSORBABLE ANCHOR (1) BLUE (1) WHITE/BLUE/GREEN STRIPED (1) WHITE/BLACK STRIPED, SIZE 2 (5 METRIC) DYNACORD SUTURE, 36" (91CM) 4.5MM
Type: IMPLANTABLE DEVICE | Site: KNEE | Status: FUNCTIONAL
Brand: HEALIX ADVANCE BR 3 SUTURE ANCHOR W/DYNACORD

## 2024-02-22 DEVICE — TENDON ANT TIBIALIS >20X>6MM: Type: IMPLANTABLE DEVICE | Site: KNEE | Status: FUNCTIONAL

## 2024-02-22 DEVICE — MILAGRO ADVANCE INTERFERENCE SCREW ABSORBABLE - TCP/PLGA 11 X 30MM
Type: IMPLANTABLE DEVICE | Site: KNEE | Status: FUNCTIONAL
Brand: MILAGRO

## 2024-02-22 DEVICE — TRUESPAN MENISCAL REPAIR SYSTEM PEEK 12 DEGREES ORTHOCORD VIOLET BRAIDED COMPOSITE SUTURE SIZE 2-0, (2) PEEK BACKSTOPS, AND (1) APPLIER WITH 12 DEGREES NEEDLE DEPTH STOP: 10MM-20MM PLUS OR MINUS 1MM; NEEDLE: 10MM PLUS OR MINUS .13MM
Type: IMPLANTABLE DEVICE | Site: KNEE | Status: FUNCTIONAL
Brand: TRUESPAN ORTHOCORD

## 2024-02-22 DEVICE — MILAGRO ADVANCE INTERFERENCE SCREW ABSORBABLE-TCP/PLGA 9 X 23MM
Type: IMPLANTABLE DEVICE | Site: KNEE | Status: FUNCTIONAL
Brand: MILAGRO

## 2024-02-22 DEVICE — FIBER CORTICAL ENHNC 5CC: Type: IMPLANTABLE DEVICE | Site: KNEE | Status: FUNCTIONAL

## 2024-02-22 RX ORDER — LIDOCAINE HYDROCHLORIDE 20 MG/ML
INJECTION INTRAVENOUS
Status: DISCONTINUED | OUTPATIENT
Start: 2024-02-22 | End: 2024-02-22

## 2024-02-22 RX ORDER — ONDANSETRON HYDROCHLORIDE 2 MG/ML
4 INJECTION, SOLUTION INTRAVENOUS ONCE AS NEEDED
Status: DISCONTINUED | OUTPATIENT
Start: 2024-02-22 | End: 2024-02-22 | Stop reason: HOSPADM

## 2024-02-22 RX ORDER — ACETAMINOPHEN 500 MG
1000 TABLET ORAL
Status: COMPLETED | OUTPATIENT
Start: 2024-02-22 | End: 2024-02-22

## 2024-02-22 RX ORDER — ROPIVACAINE HYDROCHLORIDE 5 MG/ML
INJECTION, SOLUTION EPIDURAL; INFILTRATION; PERINEURAL
Status: COMPLETED | OUTPATIENT
Start: 2024-02-22 | End: 2024-02-22

## 2024-02-22 RX ORDER — SODIUM CHLORIDE 0.9 % (FLUSH) 0.9 %
10 SYRINGE (ML) INJECTION CONTINUOUS
Status: DISCONTINUED | OUTPATIENT
Start: 2024-02-22 | End: 2024-02-22 | Stop reason: HOSPADM

## 2024-02-22 RX ORDER — METHOCARBAMOL 500 MG/1
1000 TABLET, FILM COATED ORAL ONCE AS NEEDED
Status: COMPLETED | OUTPATIENT
Start: 2024-02-22 | End: 2024-02-22

## 2024-02-22 RX ORDER — SODIUM CHLORIDE 0.9 % (FLUSH) 0.9 %
3 SYRINGE (ML) INJECTION
Status: DISCONTINUED | OUTPATIENT
Start: 2024-02-22 | End: 2024-02-22 | Stop reason: HOSPADM

## 2024-02-22 RX ORDER — OXYCODONE HYDROCHLORIDE 5 MG/1
5 TABLET ORAL EVERY 4 HOURS PRN
Status: DISCONTINUED | OUTPATIENT
Start: 2024-02-22 | End: 2024-02-22 | Stop reason: HOSPADM

## 2024-02-22 RX ORDER — KETAMINE HCL IN 0.9 % NACL 50 MG/5 ML
SYRINGE (ML) INTRAVENOUS
Status: DISCONTINUED | OUTPATIENT
Start: 2024-02-22 | End: 2024-02-22

## 2024-02-22 RX ORDER — VANCOMYCIN HYDROCHLORIDE 1 G/20ML
INJECTION, POWDER, LYOPHILIZED, FOR SOLUTION INTRAVENOUS
Status: DISCONTINUED | OUTPATIENT
Start: 2024-02-22 | End: 2024-02-22 | Stop reason: HOSPADM

## 2024-02-22 RX ORDER — ROPIVACAINE HYDROCHLORIDE 2 MG/ML
INJECTION, SOLUTION EPIDURAL; INFILTRATION; PERINEURAL CONTINUOUS
Status: DISCONTINUED | OUTPATIENT
Start: 2024-02-22 | End: 2024-02-22 | Stop reason: HOSPADM

## 2024-02-22 RX ORDER — MIDAZOLAM HYDROCHLORIDE 1 MG/ML
INJECTION, SOLUTION INTRAMUSCULAR; INTRAVENOUS
Status: DISCONTINUED | OUTPATIENT
Start: 2024-02-22 | End: 2024-02-22

## 2024-02-22 RX ORDER — ROPIVACAINE/EPI/CLONIDINE/KET 2.46-0.005
SYRINGE (ML) INJECTION
Status: DISCONTINUED | OUTPATIENT
Start: 2024-02-22 | End: 2024-02-22 | Stop reason: HOSPADM

## 2024-02-22 RX ORDER — FENTANYL CITRATE 50 UG/ML
100 INJECTION, SOLUTION INTRAMUSCULAR; INTRAVENOUS
Status: DISCONTINUED | OUTPATIENT
Start: 2024-02-22 | End: 2024-02-22 | Stop reason: HOSPADM

## 2024-02-22 RX ORDER — FENTANYL CITRATE 50 UG/ML
INJECTION, SOLUTION INTRAMUSCULAR; INTRAVENOUS
Status: DISCONTINUED | OUTPATIENT
Start: 2024-02-22 | End: 2024-02-22

## 2024-02-22 RX ORDER — EPINEPHRINE 1 MG/ML
INJECTION INTRAMUSCULAR; INTRAVENOUS; SUBCUTANEOUS
Status: DISCONTINUED | OUTPATIENT
Start: 2024-02-22 | End: 2024-02-22 | Stop reason: HOSPADM

## 2024-02-22 RX ORDER — MUPIROCIN 20 MG/G
OINTMENT TOPICAL
Status: DISCONTINUED | OUTPATIENT
Start: 2024-02-22 | End: 2024-02-22 | Stop reason: HOSPADM

## 2024-02-22 RX ORDER — HYDROMORPHONE HYDROCHLORIDE 1 MG/ML
0.2 INJECTION, SOLUTION INTRAMUSCULAR; INTRAVENOUS; SUBCUTANEOUS EVERY 5 MIN PRN
Status: DISCONTINUED | OUTPATIENT
Start: 2024-02-22 | End: 2024-02-22 | Stop reason: HOSPADM

## 2024-02-22 RX ORDER — CELECOXIB 200 MG/1
400 CAPSULE ORAL
Status: COMPLETED | OUTPATIENT
Start: 2024-02-22 | End: 2024-02-22

## 2024-02-22 RX ORDER — EPHEDRINE SULFATE 50 MG/ML
INJECTION, SOLUTION INTRAVENOUS
Status: DISCONTINUED | OUTPATIENT
Start: 2024-02-22 | End: 2024-02-22

## 2024-02-22 RX ORDER — MIDAZOLAM HYDROCHLORIDE 1 MG/ML
1 INJECTION INTRAMUSCULAR; INTRAVENOUS
Status: DISCONTINUED | OUTPATIENT
Start: 2024-02-22 | End: 2024-02-22 | Stop reason: HOSPADM

## 2024-02-22 RX ORDER — PHENYLEPHRINE HYDROCHLORIDE 10 MG/ML
INJECTION INTRAVENOUS
Status: DISCONTINUED | OUTPATIENT
Start: 2024-02-22 | End: 2024-02-22

## 2024-02-22 RX ORDER — PROPOFOL 10 MG/ML
VIAL (ML) INTRAVENOUS
Status: DISCONTINUED | OUTPATIENT
Start: 2024-02-22 | End: 2024-02-22

## 2024-02-22 RX ORDER — DEXAMETHASONE SODIUM PHOSPHATE 4 MG/ML
INJECTION, SOLUTION INTRA-ARTICULAR; INTRALESIONAL; INTRAMUSCULAR; INTRAVENOUS; SOFT TISSUE
Status: DISCONTINUED | OUTPATIENT
Start: 2024-02-22 | End: 2024-02-22

## 2024-02-22 RX ADMIN — EPHEDRINE SULFATE 10 MG: 50 INJECTION INTRAVENOUS at 01:02

## 2024-02-22 RX ADMIN — ROPIVACAINE HYDROCHLORIDE 10 ML: 5 INJECTION EPIDURAL; INFILTRATION; PERINEURAL at 08:02

## 2024-02-22 RX ADMIN — SODIUM CHLORIDE, SODIUM GLUCONATE, SODIUM ACETATE, POTASSIUM CHLORIDE, MAGNESIUM CHLORIDE, SODIUM PHOSPHATE, DIBASIC, AND POTASSIUM PHOSPHATE: .53; .5; .37; .037; .03; .012; .00082 INJECTION, SOLUTION INTRAVENOUS at 11:02

## 2024-02-22 RX ADMIN — LIDOCAINE HYDROCHLORIDE 100 MG: 20 INJECTION INTRAVENOUS at 10:02

## 2024-02-22 RX ADMIN — FENTANYL CITRATE 50 MCG: 50 INJECTION, SOLUTION INTRAMUSCULAR; INTRAVENOUS at 11:02

## 2024-02-22 RX ADMIN — MIDAZOLAM HYDROCHLORIDE 2 MG: 1 INJECTION, SOLUTION INTRAMUSCULAR; INTRAVENOUS at 10:02

## 2024-02-22 RX ADMIN — PHENYLEPHRINE HYDROCHLORIDE 100 MCG: 10 INJECTION INTRAVENOUS at 11:02

## 2024-02-22 RX ADMIN — EPHEDRINE SULFATE 10 MG: 50 INJECTION INTRAVENOUS at 12:02

## 2024-02-22 RX ADMIN — MIDAZOLAM 2 MG: 1 INJECTION INTRAMUSCULAR; INTRAVENOUS at 08:02

## 2024-02-22 RX ADMIN — HYDROMORPHONE HYDROCHLORIDE 0.2 MG: 1 INJECTION, SOLUTION INTRAMUSCULAR; INTRAVENOUS; SUBCUTANEOUS at 02:02

## 2024-02-22 RX ADMIN — Medication 20 MG: at 10:02

## 2024-02-22 RX ADMIN — PROPOFOL 200 MG: 10 INJECTION, EMULSION INTRAVENOUS at 10:02

## 2024-02-22 RX ADMIN — Medication 10 MG: at 11:02

## 2024-02-22 RX ADMIN — MUPIROCIN: 20 OINTMENT TOPICAL at 08:02

## 2024-02-22 RX ADMIN — VANCOMYCIN HYDROCHLORIDE 1000 MG: 1 INJECTION, POWDER, LYOPHILIZED, FOR SOLUTION INTRAVENOUS at 08:02

## 2024-02-22 RX ADMIN — Medication 10 MG: at 12:02

## 2024-02-22 RX ADMIN — CELECOXIB 400 MG: 200 CAPSULE ORAL at 08:02

## 2024-02-22 RX ADMIN — GLYCOPYRROLATE 0.2 MG: 0.2 INJECTION INTRAMUSCULAR; INTRAVENOUS at 12:02

## 2024-02-22 RX ADMIN — FENTANYL CITRATE 100 MCG: 50 INJECTION INTRAMUSCULAR; INTRAVENOUS at 08:02

## 2024-02-22 RX ADMIN — Medication: at 02:02

## 2024-02-22 RX ADMIN — OXYCODONE 5 MG: 5 TABLET ORAL at 02:02

## 2024-02-22 RX ADMIN — SODIUM CHLORIDE: 0.9 INJECTION, SOLUTION INTRAVENOUS at 09:02

## 2024-02-22 RX ADMIN — METHOCARBAMOL 1000 MG: 500 TABLET ORAL at 02:02

## 2024-02-22 RX ADMIN — DEXAMETHASONE SODIUM PHOSPHATE 4 MG: 4 INJECTION, SOLUTION INTRAMUSCULAR; INTRAVENOUS at 11:02

## 2024-02-22 RX ADMIN — ACETAMINOPHEN 1000 MG: 500 TABLET ORAL at 08:02

## 2024-02-22 NOTE — TRANSFER OF CARE
"Anesthesia Transfer of Care Note    Patient: Ramon Carlos    Procedure(s) Performed: Procedure(s) (LRB):  RECONSTRUCTION, KNEE, ACL, ARTHROSCOPIC (Left)  RECONSTRUCTION, LIGAMENT, MCL (Left)  ARTHROSCOPY,KNEE,WITH MENISCUS REPAIR (Left)  ARTHROSCOPY, KNEE, WITH CHONDROPLASTY (Left)  LYSIS, ADHESIONS, KNEE, ARTHROSCOPIC (Left)    Patient location: PACU    Anesthesia Type: general    Transport from OR: Transported from OR on 6-10 L/min O2 by face mask with adequate spontaneous ventilation    Post pain: adequate analgesia    Post assessment: no apparent anesthetic complications    Post vital signs: stable    Level of consciousness: awake    Nausea/Vomiting: no nausea/vomiting    Complications: none    Transfer of care protocol was followed      Last vitals: Visit Vitals  /75   Pulse 64   Temp 37.1 °C (98.8 °F) (Temporal)   Resp 18   Ht 5' 11" (1.803 m)   Wt 83.9 kg (185 lb)   SpO2 98%   BMI 25.80 kg/m²     "

## 2024-02-22 NOTE — BRIEF OP NOTE
[place] - Surgery ([facility])  Brief Operative Note    Surgery Date: 2/22/2024     Surgeon(s) and Role:     * Paolo Gibbons MD - Primary    Assisting Surgeon: None    Pre-op Diagnosis:  Rupture of anterior cruciate ligament of left knee, initial encounter [S83.512A]  Sprain of medial collateral ligament of left knee, initial encounter [S83.412A]  Acute lateral meniscus tear of left knee, initial encounter [S83.282A]  Acute medial meniscus tear of left knee, initial encounter [S83.242A]    Post-op Diagnosis:  Post-Op Diagnosis Codes:     * Rupture of anterior cruciate ligament of left knee, initial encounter [S83.512A]     * Sprain of medial collateral ligament of left knee, initial encounter [S83.412A]     * Acute lateral meniscus tear of left knee, initial encounter [S83.282A]     * Acute medial meniscus tear of left knee, initial encounter [S83.242A]    Procedure(s) (LRB):  RECONSTRUCTION, KNEE, ACL, ARTHROSCOPIC (Left)  RECONSTRUCTION, LIGAMENT, MCL (Left)  ARTHROSCOPY,KNEE,WITH MENISCUS REPAIR (Left)  ARTHROSCOPY, KNEE, WITH CHONDROPLASTY (Left)  LYSIS, ADHESIONS, KNEE, ARTHROSCOPIC (Left)    Anesthesia: General    Operative Findings: see op note    Estimated Blood Loss: * No values recorded between 2/22/2024 11:12 AM and 2/22/2024  1:49 PM *         Specimens:   Specimen (24h ago, onward)      None              Discharge Note    OUTCOME: Patient tolerated treatment/procedure well without complication and is now ready for discharge.    DISPOSITION: Home or Self Care    FINAL DIAGNOSIS:  Rupture of anterior cruciate ligament of left knee    FOLLOWUP: In clinic    DISCHARGE INSTRUCTIONS:    Discharge Procedure Orders   Diet general     Call MD for:  temperature >100.4     Call MD for:  persistent nausea and vomiting     Call MD for:  severe uncontrolled pain     Call MD for:  difficulty breathing, headache or visual disturbances     Call MD for:  redness, tenderness, or signs of infection (pain, swelling,  redness, odor or green/yellow discharge around incision site)     Call MD for:  hives     Call MD for:  persistent dizziness or light-headedness     Call MD for:  extreme fatigue     Leave dressing on - Keep it clean, dry, and intact until clinic visit     Keep surgical extremity elevated     Ice to affected area   Order Comments: using barrier between ice and skin (specify duration&frequency)     No driving, operating heavy equipment or signing legal documents while taking pain medication     Weight bearing restrictions (specify)   Order Comments: Toe touch weight bearing

## 2024-02-22 NOTE — INTERVAL H&P NOTE
The patient has been examined and the H&P has been reviewed:    I concur with the findings and no changes have occurred since H&P was written.    Anesthesia/Surgery risks, benefits and alternative options discussed and understood by patient/family.          Active Hospital Problems    Diagnosis  POA    *Rupture of anterior cruciate ligament of left knee [S83.512A]  Yes      Resolved Hospital Problems   No resolved problems to display.

## 2024-02-22 NOTE — PLAN OF CARE
Patient is AAO and VSS.  Tolerating PO and states pain is tolerable.  Dressing CDI.  Patient states they are ready for d/c.  IV removed.  Catheter tip intact.  Caregiver at bedside.  Discharge instructions reviewed and copy given to the patient and caregiver.  Questions answered.  Both verbalized understanding.  Medication delivered to bedside. Patient's own DME at bedside. Patient wheeled to car by RN

## 2024-02-22 NOTE — DISCHARGE INSTRUCTIONS
[address], [place]                                                                                          [phone]                   Postoperative Instructions for Knee Surgery                 Your Surgery Included:   Open               Arthroscopic   [x] Ligament Repair       [] Diagnostic     [] ACL     [] PCL     [x] MCL     [] MQTFL   [] ALL      [x] Synovectomy / Plica Removal [] Meniscal Cartilage Repair / Transplantation      [] Lysis of Adhesions / Manipulation [] Articular Cartilage Repair      [] Interval Release           [] Cartimax       [] OATS   [] CATALINO      [] Meniscectomy           [] Osteochondral Allograft      [x] Meniscal Cartilage Repair  [] Patellar Realignment       [] Debridement / Chondroplasty         [] Lateral Release   [] Ligament Repair      [] Articular Cartilage Repair          [] Extensor Mechanism             []   Microfracture  []  OATS         []  Cartilage Biopsy [] Tendon Repair          [x] Ligament Reconstruction          [] Patella                  [] Quadriceps             [x]   ACL    []   PCL  [] High Tibial Osteotomy       [] Subchondroplasty  [] Joint Replacement         [] Loose Body Removal           [] Unicompartmental   [] Patellofemoral       [] Distal Femoral Osteotomy                 Call our office ([phone]) immediately or message through [facility] if you experience any of the following:       Excessive bleeding or pus like drainage at the incision site       Uncontrollable pain not relieved by pain medication       Excessive swelling or redness at the incision site       Fever above 101.5 degrees not controlled with Tylenol or Motrin       Shortness of Breath or severe calf pain       Any foul odor or blistering from the surgery site    FOR EMERGENCIES: [facility] is the best way to contact us. If on the weekend, page the  at [phone] who will direct your call appropriately. If your procedure is a  total joint replacement, please call the number on your wristband.    1.   Multimodal Pain Management: A cold therapy cuff, pain medications, anti-inflammatories, local injections, TENs unit, and in some cases, regional anesthesia injections are used to manage your post-operative pain. The decision to use each of these options is based on their risks and benefits.     Medications: You were given one or more of the following medication prescriptions during your preoperative appointment. Follow the instructions on the bottles.     Narcotic Medication (usually Percocet, Roxicodone, or Norco): Begin taking the medication before your knee starts to hurt. Some patients do not like to take any medication, but if you wait until your pain is severe before taking, you will be very uncomfortable for several hours waiting for the narcotic to work. Always take with food.     Nausea / Vomiting: For this issue, we prescribe Phenergan or Zofran, use this medication as directed as needed for nausea.     Cold Therapy: You may have been sent home with an ice wrap. The cold can provide short-term pain relief, and limits swelling by reducing blood flow to the injured area. Apply the cold gel pack for 20 minutes and then take it off for 20 minutes. Use throughout the day, as needed to help relieve pain and control swelling.     Regional Anesthesia Injections (Blocks): You may have been given a regional nerve block/catheter either before or after surgery. This may make your entire leg numb for 2-5 days.                           * Proceed with caution when bearing weight on your leg.     2.   Diet: Eat a bland diet for the first day after surgery. Progress your diet as tolerated. Constipation may occur with Narcotic usage. We recommend Colace 100mg twice a day while taking narcotics.    3.   Activity: Limit your activity during the first 48 hours, keep your leg elevated with pillows under your heel. After the first 48 hours at home,  increase your activity level based on your symptoms.    4.   Dressing: (b) The soft, bulky dressing will be removed on the 3rd day after surgery. The Aquacel (tan, long adhesive bandage) will remain on until the 1st post operative appointment. Place waterproof bandages at this time. Keep wounds as dry as possible for first 2 weeks. It is normal for some blood to be seen on the dressings. It is also normal for you to see apparent bruising on the skin around your incisions. If you are concerned by the drainage or the appearance of your wound site, you can send a picture via [facility].    5.   Shower: (b) You may shower on the 3rd day after surgery. The Aquacel bandage is to be covered with saran wrap before showering. Do not get bandage wet. You may see a small incision with a suture. Place waterproof bandages  prior to shower. It is recommended to use Saran wrap before showering. Do not submerge limb in any water for 4 weeks or incisions completely healed.    6.   Knee Brace: You may have been sent home in a hinged knee brace. Your brace is set at 0 to 45 degrees of motion. Brace will go to 60 degrees of motion after 1 week. Then will go to 90 degrees of motion at 3 weeks postop. Wear the brace for 6 weeks, LOCKED in full extension when walking, you will need to wear this brace at all time unless instructed otherwise. You may unlock at rest or for exercises.    7.   Your procedure did not require a Continuous Passive Motion (CPM) device.    8.   Weight Bearing: You may have been sent home with crutches. If instructed (see below), use these crutches at all times unless at complete rest.      [x] Toe touch weight bearing  for    4 weeks (you may touch your toes to the floor)                9.  Knee Exercises: Begin these exercises the first day after surgery in order to help you regain your motion and strength. You may do the following marked exercises:     [x] Quad Sets - Begin activating your quadriceps muscle by  "driving your          knee downward into full knee extension while seated on a table or bed   with a towel rolled and propped under your heel     [x] Straight Leg Raise (SLR) - While ino your quadriceps muscle, lift     your fully extended leg to the level of your non-operative knee (as shown)     [] Heel Slides - With the knee straight, slide your heel slowly toward your   buttocks, hold at the endpoint for 10-15 seconds, then slowly straighten     [x] Ankle pumps - With your knee straight, move your ankle in a "pumping"    fashion to activate your calf and leg muscles      10.  Physical Therapy: Physical therapy is an essential component to your recovery from surgery. Your physical therapy will start in 1-3 days.    FIRST POSTOPERATIVE VISIT: As scheduled.      "

## 2024-02-22 NOTE — ANESTHESIA PROCEDURE NOTES
Intubation    Date/Time: 2/22/2024 10:53 AM    Performed by: Claudia Owen CRNA  Authorized by: Jessica Gardner MD    Intubation:     Induction:  Intravenous    Mask Ventilation:  Not attempted    Attempts:  1    Attempted By:  CRNA    Difficult Airway Encountered?: No      Complications:  None    Airway Device:  Supraglottic airway/LMA    Airway Device Size:  4.0    Style/Cuff Inflation:  Cuffed    Secured at:  The lips    Placement Verified By:  Capnometry    Complicating Factors:  None    Findings Post-Intubation:  BS equal bilateral

## 2024-02-22 NOTE — ANESTHESIA PREPROCEDURE EVALUATION
02/22/2024  Pre-operative evaluation for Procedure(s) (LRB):  RECONSTRUCTION, KNEE, ACL, ARTHROSCOPIC (Left)  RECONSTRUCTION, LIGAMENT, MCL (Left)  ARTHROSCOPY,KNEE,WITH MENISCUS REPAIR (Left)  ARTHROSCOPY, KNEE, WITH CHONDROPLASTY (Left)  SYNOVECTOMY, KNEE, LIMITED (Left)    Ramon Carlos is a 48 y.o. male     Patient Active Problem List   Diagnosis    Vitamin D deficiency disease    High-density lipoprotein deficiency    Allergic rhinitis, seasonal    Family history of colon cancer    Incomplete right bundle branch block    Right elbow pain    History of prostatitis       Review of patient's allergies indicates:   Allergen Reactions    Codeine     Pcn [penicillins]     Sulfa (sulfonamide antibiotics)        No current facility-administered medications on file prior to encounter.     Current Outpatient Medications on File Prior to Encounter   Medication Sig Dispense Refill    ascorbic acid, vitamin C, (VITAMIN C) 100 MG tablet Take 1000 mg daily      azelastine (ASTELIN) 137 mcg (0.1 %) nasal spray 1 spray (137 mcg total) by Nasal route 2 (two) times daily. 30 mL 12    celecoxib (CELEBREX) 200 MG capsule Take 1 capsule (200 mg total) by mouth 2 (two) times daily with meals. (breakfast and dinner) 60 capsule 0    cholecalciferol, vitamin D3, (VITAMIN D3) 50 mcg (2,000 unit) Cap Take 1 capsule by mouth once daily.      esomeprazole (NEXIUM) 20 MG capsule Take 1 capsule (20 mg total) by mouth before breakfast. (Patient not taking: Reported on 2/14/2024) 30 capsule 11    ezetimibe (ZETIA) 10 mg tablet Take 1 tablet (10 mg total) by mouth once daily. 90 tablet 12    ezetimibe (ZETIA) 10 mg tablet Take 1 tablet (10 mg total) by mouth once daily. 90 tablet 12    fish oil-omega-3 fatty acids 300-1,000 mg capsule Take 2 g by mouth once daily.      fluticasone propionate (FLONASE) 50 mcg/actuation nasal spray  Use 1 spray (50 mcg total) by Each Nostril route 2 (two) times daily. 16 g 12    pilocarpine HCl (VUITY) 1.25 % Drop INSTILL 1 DROP INTO EACH EYE ONCE DAILY 2.5 mL 11    pilocarpine HCl (VUITY) 1.25 % Drop Instill 1 drop into both eyes once a day 2.5 mL 3    turmeric 400 mg Cap Take 1 capsule by mouth once daily.         Past Surgical History:   Procedure Laterality Date    ADENOIDECTOMY      COLONOSCOPY N/A 7/10/2023    Procedure: COLONOSCOPY;  Surgeon: Laurent Terry MD;  Location: [facility] ([address] [place] [address]);  Service: Endoscopy;  Laterality: N/A;  Ref by Omar Gudino, instr via portal - PC\  7/5/23- Prec[place] confirmed- [place]    EYE SURGERY      TONSILLECTOMY         Social History     Socioeconomic History    Marital status:    Tobacco Use    Smoking status: Never    Smokeless tobacco: Never   Substance and Sexual Activity    Alcohol use: Yes     Alcohol/week: 0.0 standard drinks of alcohol     Comment: social    Drug use: No     Social Determinants of Health     Financial Resource Strain: Low Risk  (12/11/2023)    Overall Financial Resource Strain (CARDIA)     Difficulty of Paying Living Expenses: Not hard at all   Food Insecurity: No Food Insecurity (12/11/2023)    Hunger Vital Sign     Worried About Running Out of Food in the Last Year: Never true     Ran Out of Food in the Last Year: Never true   Transportation Needs: No Transportation Needs (12/11/2023)    PRAPARE - Transportation     Lack of Transportation (Medical): No     Lack of Transportation (Non-Medical): No   Physical Activity: Sufficiently Active (12/11/2023)    Exercise Vital Sign     Days of Exercise per Week: 5 days     Minutes of Exercise per Session: 60 min   Stress: No Stress Concern Present (12/11/2023)    [place] [employer] - Occupational Stress Questionnaire     Feeling of Stress : Not at all   Social Connections: Unknown (12/11/2023)    Social Connection and Isolation Panel [NHANES]     Frequency of  Communication with Friends and Family: Three times a week     Frequency of Social Gatherings with Friends and Family: Twice a week     Active Member of Clubs or Organizations: Yes     Attends Club or Organization Meetings: More than 4 times per year     Marital Status:    Housing Stability: Low Risk  (12/11/2023)    Housing Stability Vital Sign     Unable to Pay for Housing in the Last Year: No     Number of Places Lived in the Last Year: 1     Unstable Housing in the Last Year: No         EKG:  Sinus bradycardia   Otherwise normal ECG   When compared with ECG of 10-DEC-2015 14:57,   No significant change was found   Confirmed by Sourav Renteria MD ([id number]) on 12/30/2015 10:04:29 AM     2D Echo:  No results found for this or any previous visit.        Pre-op Assessment    I have reviewed the Patient Summary Reports.     I have reviewed the Nursing Notes. I have reviewed the NPO Status.   I have reviewed the Medications.     Review of Systems  Anesthesia Hx:             Denies Family Hx of Anesthesia complications.    Denies Personal Hx of Anesthesia complications.                    EENT/Dental:  chronic allergic rhinitis           Cardiovascular:  Exercise tolerance: good       Dysrhythmias   Denies Angina.       Denies TAN.                            Pulmonary:    Denies COPD.  Denies Asthma.                    Renal/:   Denies Chronic Renal Disease.                Hepatic/GI:      Denies GERD.             Neurological:    Denies CVA.    Denies Seizures.                                Endocrine:  Denies Diabetes.               Physical Exam  General: Well nourished, Cooperative, Alert and Oriented    Airway:  Mallampati: II / I  Mouth Opening: Normal  TM Distance: Normal  Tongue: Normal  Neck ROM: Normal ROM    Dental:  Intact    Chest/Lungs:  Clear to auscultation, Normal Respiratory Rate    Heart:  Rate: Normal  Rhythm: Regular Rhythm        Anesthesia Plan  Type of Anesthesia, risks & benefits  discussed:    Anesthesia Type: Gen Natural Airway  Intra-op Monitoring Plan: Standard ASA Monitors  Post Op Pain Control Plan: multimodal analgesia  Induction:  IV  Informed Consent: Informed consent signed with the Patient and all parties understand the risks and agree with anesthesia plan.  All questions answered. Patient consented to blood products? Yes  ASA Score: 2  Day of Surgery Review of History & Physical: H&P Update referred to the surgeon/provider.    Ready For Surgery From Anesthesia Perspective.     .

## 2024-02-22 NOTE — ANESTHESIA POSTPROCEDURE EVALUATION
Anesthesia Post Evaluation    Patient: Ramon Carlos    Procedure(s) Performed: Procedure(s) (LRB):  RECONSTRUCTION, KNEE, ACL, ARTHROSCOPIC (Left)  RECONSTRUCTION, LIGAMENT, MCL (Left)  ARTHROSCOPY,KNEE,WITH MENISCUS REPAIR (Left)  ARTHROSCOPY, KNEE, WITH CHONDROPLASTY (Left)  LYSIS, ADHESIONS, KNEE, ARTHROSCOPIC (Left)    Final Anesthesia Type: general      Patient location during evaluation: PACU  Patient participation: Yes- Able to Participate  Level of consciousness: awake and alert  Post-procedure vital signs: reviewed and stable  Pain management: adequate  Airway patency: patent  SUSY mitigation strategies: Multimodal analgesia  PONV status at discharge: No PONV  Anesthetic complications: no      Cardiovascular status: blood pressure returned to baseline and hemodynamically stable  Respiratory status: unassisted  Hydration status: euvolemic  Follow-up not needed.              Vitals Value Taken Time   /84 02/22/24 1501   Temp 36.9 °C (98.4 °F) 02/22/24 1411   Pulse 68 02/22/24 1507   Resp 14 02/22/24 1507   SpO2 96 % 02/22/24 1507   Vitals shown include unvalidated device data.      No case tracking events are documented in the log.      Pain/Shayne Score: Pain Rating Prior to Med Admin: 6 (2/22/2024  2:48 PM)  Shayne Score: 9 (2/22/2024  2:16 PM)

## 2024-02-22 NOTE — ANESTHESIA PROCEDURE NOTES
Adductor canal catheter    Patient location during procedure: pre-op   Block not for primary anesthetic.  Reason for block: at surgeon's request and post-op pain management   Post-op Pain Location: L knee   Start time: 2/22/2024 8:42 AM  Timeout: 2/22/2024 8:41 AM   End time: 2/22/2024 8:47 AM    Staffing  Authorizing Provider: Slade Orozco MD  Performing Provider: Robb Camp MD    Staffing  Performed by: Robb Camp MD  Authorized by: Slade Orozco MD    Preanesthetic Checklist  Completed: patient identified, IV checked, site marked, risks and benefits discussed, surgical consent, monitors and equipment checked, pre-op evaluation and timeout performed  Peripheral Block  Patient position: supine  Prep: ChloraPrep and site prepped and draped  Patient monitoring: heart rate, cardiac monitor, continuous pulse ox, continuous capnometry and frequent blood pressure checks  Block type: adductor canal  Laterality: left  Injection technique: continuous  Needle  Needle type: Tuohy   Needle gauge: 17 G  Needle length: 3.5 in  Needle localization: anatomical landmarks and ultrasound guidance  Catheter type: spring wound  Catheter size: 19 G  Test dose: lidocaine 1.5% with Epi 1-to-200,000 and negative   -ultrasound image captured on disc.  Assessment  Injection assessment: negative aspiration, negative parasthesia and local visualized surrounding nerve  Paresthesia pain: none  Heart rate change: no  Slow fractionated injection: yes    Medications:    Medications: ropivacaine (NAROPIN) injection 0.5% - Perineural   10 mL - 2/22/2024 8:47:00 AM    Additional Notes  Time out conducted. Site tommie confirmed with team and patient. Allergies reviewed.   Vital signs stable throughout block. RN monitoring vitals throughout.   Needle advanced under continuous ultrasound guidance.  Local injected incrementally after confirming negative aspiration. No signs or symptoms of intravascular or intraneural injection noted.    No persistent paresthesias elicited or expressed. Patient tolerated procedure well.  20 mL of 0.25% ropivacaine with epinephrine 1:300K used for the block.

## 2024-02-23 NOTE — OP NOTE
[facility] ([facility])  Operative Note      Date of Procedure: 2/22/2024     Procedure:   1. Left  Arthroscopy, anterior cruciate ligament reconstruction 87802  2.  Left  Ligament knee reconstruction, extra-articular 33213, MCL  3.  Left  Arthroscopy, with meniscus repair (medial AND lateral) 19676  4.  Left  Arthroscopy, debridement/shaving of articular cartilage (chondroplasty) 02589  5.  Left  Arthroscopy, with lysis of adhesions 90237    Complexity of the case increases based on the length of time between the initial operative intervention and date of injury with change in anatomy secondary to timing of surgery.    Surgeon(s) and Role:     * Paolo Gibbons MD - Primary    Assisting Surgeon:     MD Syeda Liu PA-C    Pre-Operative Diagnosis: Rupture of anterior cruciate ligament of left knee, initial encounter [S83.512A]  Sprain of medial collateral ligament of left knee, initial encounter [S83.412A]  Acute lateral meniscus tear of left knee, initial encounter [S83.282A]  Acute medial meniscus tear of left knee, initial encounter [S83.242A]    Post-Operative Diagnosis: Post-Op Diagnosis Codes:     * Rupture of anterior cruciate ligament of left knee, initial encounter [S83.512A]     * Sprain of medial collateral ligament of left knee, initial encounter [S83.412A]     * Acute lateral meniscus tear of left knee, initial encounter [S83.282A]     * Acute medial meniscus tear of left knee, initial encounter [S83.242A]    Anesthesia: General    Operative Findings (including complications, if any): ACL tear, MCL tear (proximal), High grade partial medial meniscal tear, Complex Lateral Radial tear, Chondromalacia, Adhesions    Description of Technical Procedures:              Expand All Collapse All   DATE OF PROCEDURE: 2/22/2024    ATTENDING SURGEON: Surgeon(s) and Role:     * Paolo Gibbons MD - Primary    Assistants:  MD Syeda Liu PA-C     PREOPERATIVE  DIAGNOSIS:  Left  Chondromalacia, (excludes patella) M94.29, Internal derangement knee M23.90, Tear, Lateral meniscus, acute S83.289A, Tear, Medial meniscus, acute S83.249A, and Anterior Cruciate Ligament Tear S83.510    POSTOPERATIVE DIAGNOSIS:   Left  Chondromalacia, (excludes patella) M94.29, Internal derangement knee M23.90, Tear, Lateral meniscus, acute S83.289A, Tear, Medial meniscus, acute S83.249A, and Anterior Cruciate Ligament Tear S83.510    PROCEDURES(S) PERFORMED: Complex  1. Left  Arthroscopy, anterior cruciate ligament reconstruction 95254  2.  Left  Ligament knee reconstruction, extra-articular 24458, MCL  3.  Left  Arthroscopy, with meniscus repair (medial AND lateral) 98092  4.  Left  Arthroscopy, debridement/shaving of articular cartilage (chondroplasty) 82940  5.  Left  Arthroscopy, with lysis of adhesions 93759    Complexity of the case increases based on the length of time between the initial operative intervention and date of injury with change in anatomy secondary to timing of surgery.    ANESTHESIA: General / LMA, Adductor block with catheter, Local 50cc JASON    FLUIDS IN THE CASE: 1500 ml    ESTIMATED BLOOD LOSS: Minimal    URINE OUTPUT: 0 ml    COMPLICATIONS: none    CONDITION ON RETURN TO RECOVERY ROOM: Good     Expand All Collapse All       IMPLANTS UTILIZED: [employer] equipment, Mitek Truespan x 8, Mitek Rigidloop Adjustable x 1, Mitek Yulissa screw 9 x 23 mm, 11 x 30 mm, Mitek Triple loaded Healix anchor x 1, Mitek Healix advance SP PEEK Rapidan x 2    GRAFT SOURCE:  BPTB , Autologous  Size 11.0 x 100 mm graft  Proximal bone plug 11.0 x 23 mm  Distal bone plug 11.0 x 24 mm    MTF medium cortical fibers    FINDINGS:     ARTICULAR CARTILAGE LESION(S):  Medial Femoral Condyle: ICRS Grade 0      Size: none  Medial tibial plateau: ICRS Grade 0      Size: none        Lateral Femoral Condyle: ICRS Grade 2      Size: 2.0 x 2.0 cm  Lateral tibial plateau: ICRS Grade 0      Size:  none        Patellar surface: ICRS Grade 0      Size: none  Trochlear groove: ICRS Grade 0      Size: none      Meniscal status:  Medial meniscus:   Partial undersurface tear   Tear location:  posterior body tear    Lateral meniscus:   Complex Radial   Tear location:  posterior body tear        EXAMINATION UNDER ANESTHESIA:   Extension 0 degrees  Flexion 100 degrees  Lachman Maneuver:  3B  Anterior Drawer: >10 mm  Pivot Shift: Positive  Posterior Drawer:  Negative  Varus stability @ 30 degrees: 0  Valgus stability @ 30 degrees: 0  Patellar glide:1 quadrant lateral, 2 quadrant medial      DESCRIPTION OF PROCEDURE: The patient was brought into the Operating Room and placed in supine position. Upon application of adductor block with catheter in the preoperative holding area, the patient underwent General to stabilize the airway. The patient was given the appropriate dose of antibiotics based on body weight. Timeout was utilized to verify the left side as the operative side. Both upper extremities were placed in comfortable position. Examination under anesthesia was performed. The nonoperative leg was carefully padded along the heel and peroneal nerve regions and maintained flat on the table. The operative leg was then stabilized with a lateral post for intra-operative positioning as well as a popliteal post placed at the mid-calf level. No bump was placed under the hip on the operative side. The operative leg was prepped and draped in a sterile fashion with ChloraPrep material.      Operative leg was prepped and draped in a sterile fashion with ChloraPrep material. Knee was taken to flexion and medial based incision measuring approximately 5 cm in length was carried down the skin down to fat and fascia. Flaps were then raised superiorly, medially as well as laterally alongthe area of concern. The peritenon was incised in the midline. The patellar tendon was exposed demonstrating a width of 30 mm, width 11 mm of bone  was obtained with a 23 mm proximal bone plug and distal 31 mm bone plug. These areas were then contoured with a rongeur as well as bone crimping devices. Drills were used to drill holes at 1.5 mm in diameter and the bone plugs with three holes placed per bone plug and #2 Orthocord sutures placed at each hole using a Hewson suture passer. Graft was demarcated at the presumed proximal   femoral bone plug with a skin marker at the bone graft junction. Grafts were then saved in the moist lap for later use in the case.    The operative leg was taken into flexion. A towel clamp was used to carefully   reapproximate skin edges. Spinal needle was used to localize the lateral   portals created with a #11 blade following localization with application of   approximately 10 mL of the Exparel mixture into the area of concern. A #11   blade was used to make this portal. The anteromedial portal was localized through the anteromedial incision outside the harvest site and # 11 blade used to create the portal.    Blunt trocar and sheath were inserted into the intercondylar notch and then subsequently into the suprapatellar pouch. This patellofemoral joint was visualized, demonstrating normal lateral patellar tilt, normal patellar subluxation. The patellar tracked midline with flexion and extension. Arthroscopic pictures were obtained. There was no articular cartilage damage in the patellofemoral compartment The lesion if present was treated with  observation. There was scar along the region that was removed with a combination of the Mitek Vapr probe and use of the shaver.    Attention was turned to the intercondylar notch where the anterior cruciate ligament (ACL) and posterior cruciate ligament (PCL) structures were visualized. Visualization demonstrated complete tearing of the ACL with an intact PCL. noted     Attention was then turned to the lateral compartment. The patient demonstrated a complex type tear of the Radial region of  the lateral meniscus. Arthroscopic instrumentation was used to verify a repairable tear in the red-white zone of the meniscus and 5 Mitek Truespan meniscal sutures were applied in vertical and horizontal fashion to stabilize the tear. There was articular cartilage damage was present in the lateral compartment as noted in the Findings section of this operative report. The lesion if present was treated with arthroscopic chondroplasty technique removing all irregular edges and flaps of articular cartilage at the lesion.    Attention was then turned to the medial compartment. The patient demonstrated a repairable tear in the complex undersurface tear of the medial red-white zone of the meniscus and three Mitek Truespan sutures were applied in vertical and horizontal fashion to stabilize the tear. There was no articular cartilage damage in the medial compartment The lesion if present was treated with  observation.      MCL Reconstruction:  Attention was returned to the intercondylar notch demonstrating significant synovitic ssue and the area of concern which was removed with a 4.2 shaver as well as use of a Mitek VAPR probe to stabilize the area of concern. Attention was then turned to the femoral region where a Mitek VAPR probe was used to remove scar from the anterolateral aspect of the intercondylar notch. Over-the-top position was visualized as well as residence ridge. Knee was taken in hyperflexion and a guidepin was placed through the anteromedial portal and into resident's ridge region at approximately the 2:30 to 3 o'clock position along the intercondylar notch wall and taken out of the anterolateral aspect of the thigh. It demonstrated a tunnel length of 35 mm. As a result, we used an 11.0 mm fluted reaming device to drill a tunnel to a depth of approximately 11.0 x 30 mm. Bone graft was then removed from the area of concern. The wire was then used to place a passing suture with the looped portion maintained  distally and this was saved along the anterior aspect of the thigh with a stat.    Attention was turned to the tibial region where a tibial ACL guide was placed in the   anteromedial portal and into the stump of the ACL remnant. The bullet portion placed  directly along the anteromedial aspect of the tibia and the guidepin was placed  directly in the stump of the ACL. The pin was left in place and a bovie cautery device used to debride directly around the guide pin. It was drilled with a 10.0 mm reamer and then exchanged the Arthrex Coring reamer pin followed by use of the Coring reamer; we then dilated up to 11.0 in 0.5 mm increments. Final bone graft while was removed and saved for later autologous bone grafting of the patellar defect. We placed the arthroscope into the anteromedial portal and the femoral and tibial tunnels visualized and found to be in the appropriate location. Final debridement of all bone fragments and loose tissue was performed arthroscopically. Arthroscopic visualization, we then used the crab claw to pull the passing suture from the femoral tunnel into the tibial tunnel. This same passing suture was then used to pass the graft. Graft was passed into the tibial tunnel up into the femoral tunnel and passed to a depth of 30 mm. A 10 mm remnant of the graft extruded distally. As a result, we rotated the graft approximately 360 degrees to allow this to contour and fit appropriately on the distal edge of the tibial tunnel. The knee was taken through 20 cycles to remove any creep in the ligament and then placed the proximal 9 x 23 mm screw with arthroscope visualization with hyperflexion.    There was continued insufficiency of the medial portion of the knee as a result decision was made to proceed with MCL reconstruction. Dissection was carried over to the medial portion of the knee. The medial MCL demonstrated disruption distally as a result this area was exposed. The tissue was present but was  of poor quality as a result we dissected proximally at the medial epicondylar level. We drilled the area with a tap and then placed a tap followed by placement of a Mitek Healix anchor triple loaded. The MTF tibialis anterior/BioBrace graft construct was passed under the superficial portion of the MCL fibers. The graft had been prepared on the back table with a diameter of the 9.0 mm. The graft was looped up to the medial epicondylar region. The sutures from the Mitek anchor were then passed through the tissue in a modified Jackson Fashion and tied. These same sutures were then used to imbricate the torn tissues along the proximal medial epicondylar region.     Using a tonsil tip stat dissection for passage of the graft was made distally along the medial joint line distally for a distance of 6 cm from the joint line. The graft was stabilized by placing sutures in a Krackow fashion with the sutures ending at the region demarcated 6 cm distal to the medial joint line. These same sutures for all four distal limbs of tissue were then passed through the Mitek knotless anchors. The areas were then drilled, tapped and the sutures were then tensioned and the knotless anchors deployed with the knee at 45 degrees flexion and varus load applied. Remnants of the tissue distally was removed. The knee demonstrated normal varus-valgus stress at 45 degrees flexion and good ROM as well.     We then to We then placed a distal screw with application of the distal 11 x 30mm screw and then with the knee at 45 degrees flexion posterior direct portion of the tibia performed.     Arthroscopic lysis of adhesions was performed with use of the Mitek Vapr probe.  Final arthroscopic pictures were obtained. Fluid was extravasated from the joint. A 4-0 nylon sutures were used to close the arthroscopic portals. We then injected additional 0.5% ropivicaine mixture using 10 ml per portal site. Xeroform was applied along with application of sterile  electrodes proximally and distally, gauze, ABD pads,cast padding, long-leg WESTLEY hose stocking and cooling unit. The patient's knee was placed into a hinged immobilizer, which was locked in -10 degrees extension and allowed the flexion to 50 degrees. The patient was then allowed to recover from anesthesia.  General was removed. The patient was taken to Recovery Room in  Good condition. At the completion case, all instrument and sponge counts were correct.      Irrigation was performed along the area of concern. Final pictures of the ACL   reconstruction was performed. We then removed all fluid from the knee and then   used the autologous bone graft saved while preparing the bone plugs and tunnels. This was applied proximally and distally as well as while drilling the tibial tunnel. This was impacted using autologous bone grafting technique into the patellar defect. Distally, we applied the demineralized cortical fibers (MTF) and autologous bone graft was applied to the distal tibial defect.     We then took the knee at 45 degrees flexion and placed#1 Vicryl sutures in inverted fashion to close the parapatellar tendon defect and then closed the paratenon with a   series of #1 Vicryl placed in figure-of-eight fashion as well. Subcutaneous   tissue was closed with 3-0 Vicryls placed in inverted fashion. Skin was closed   with running 4-0 Monocryl sutures placed in subcuticular fashion along with   application of Dermabond over the pins and tape. The arthroscopic portal   laterally was closed with 4-0 nylon sutures. We applied Xeroform gauze, ABD pads, cast padding, a long leg WESTLEY hose stocking and cooling unit as well as sterile electrode pads proximally and distally. The patient was allowed to recover from the   anesthetic. LMA was removed. The patient was then taken to Recovery Room in   stable condition with the knee maintained in hyperextension -10 degrees and   flexion 90 degrees. Brace was locked in extension. Once  the patient was   recovered from the anesthetic, LMA was removed. The patient was taken to   Recovery Room with femoral blocks applied. The patient was taken to Recovery   Room in stable condition. At the completion of the case, all instrument and   sponge counts were correct.    NOTE: I was present and scrubbed for the key portions of the procedure.    PHYSICAL THERAPY:  The patient should begin physical therapy on postoperative   day #1-3 and will be advanced to outpatient therapy as soon as   Possible following discharge.  Weight bearing:toe touch weight bearing  left leg  Range of Motion:limited to -10 degrees extension and 45 degrees flexion    No CPM required due to procedure performed     If the CPM is required the patient is to be taken out of the CPM machine as much as possible and to begin quad sets with a heel roll to obtain hyperextension, straight leg raise and heel slides with the heel supported in a closed-chain fashion    Immediate specific exercises should include:   Gait program should include the above stated weightbearing; in addition: active extension with a heel-to-toe gait working on maintaining extension    Immobilizer if present should be locked @ -10 degrees with gait and allowed to flex to 50 degrees at rest.     Discharge summary:  The patient was discharged to home in Good  Follow-up as scheduled preoperatively.    Medication(s): Refer to Discharge Medication List        Discharge home when stable  Follow-up as scheduled  Activity per instruction sheet  Condition Stable                  Significant Surgical Tasks Conducted by the Assistant(s), if Applicable: preparation and closure    Estimated Blood Loss (EBL): * No values recorded between 2/22/2024 11:12 AM and 2/22/2024  2:02 PM *           Implants:   Implant Name Type Inv. Item Serial No.  Lot No. LRB No. Used Action   [id number]  TENDON ANT TIBIALIS >20X>6MM [id number]  Left 1 Implanted   [email or web address] [id number] Left 1 Implanted   PIN GUIDE GRAFT PASS XACTPIN - [id number]  PIN GUIDE GRAFT PASS XACTPIN  [employer] [id number] Left 1 Implanted and Explanted   DEVICE TRUESPAN MENISCAL REPAI - [id number]  DEVICE TRUESPAN MENISCAL REPAI  [employer] [id number] Left 4 Implanted   DEVICE TRUESPAN MENISCAL REPAI - [id number]  DEVICE TRUESPAN MENISCAL REPAI  [employer]. [id number] Left 1 Implanted   DEVICE TRUESPAN MENISCAL REPAI - [id number]  DEVICE TRUESPAN MENISCAL REPAI  [employer] [id number] Left 3 Implanted   SPEEDTRAP    DEPU[employer]. [id number] Left 2 Implanted   SPEEDTRAP    [employer]. [id number] Left 2 Implanted   SCREW JAYDE 9 X 23MM - [id number]  SCREW JAYDE 9 X 23MM  [employer] [id number] Left 1 Implanted   REAMER CORING 10MM & ADITHYA PIN - [id number]  REAMER CORING 10MM & ADITHYA PIN  [employer] [id number] Left 1 Implanted and Explanted   ANCHOR HEALIX ADV BR 3 LD 4.5 - [id number]  ANCHOR HEALIX ADV BR 3 LD 4.5  SYNTHES [id number] Left 1 Implanted   HEALIX ADVANCE SP PEEK ANCHOR    [employer]. [id number] Left 2 Implanted   SCREW BONE JAYDE [id number] - [id number]  SCREW BONE JAYDE [id number]  [employer] [id number] Left 1 Implanted   [id number]  [employer] C [id number]  Left 1 Implanted       Specimens:   Specimen (24h ago, onward)      None                    Condition: Good    Disposition: PACU - hemodynamically stable.    Attestation: I was present and scrubbed for the key portions of the procedure.    Discharge Note    OUTCOME: Patient tolerated treatment/procedure well without complication and is now ready for discharge.    DISPOSITION: Home or Self Care    FINAL DIAGNOSIS:  Rupture of anterior cruciate ligament of left knee    FOLLOWUP: In clinic    DISCHARGE INSTRUCTIONS:    Discharge Procedure Orders   Diet general     Call MD for:  temperature >100.4     Call MD for:  persistent nausea and vomiting     Call MD for:  severe uncontrolled pain     Call MD for:  difficulty breathing, headache  or visual disturbances     Call MD for:  redness, tenderness, or signs of infection (pain, swelling, redness, odor or green/yellow discharge around incision site)     Call MD for:  hives     Call MD for:  persistent dizziness or light-headedness     Call MD for:  extreme fatigue     Leave dressing on - Keep it clean, dry, and intact until clinic visit     Keep surgical extremity elevated     Ice to affected area   Order Comments: using barrier between ice and skin (specify duration&frequency)     No driving, operating heavy equipment or signing legal documents while taking pain medication     Weight bearing restrictions (specify)   Order Comments: Toe touch weight bearing

## 2024-02-23 NOTE — ANESTHESIA POST-OP PAIN MANAGEMENT
"Acute Pain Service Progress Note    2/23/2024 1444    Patient contacted regarding [facility] infusion follow up. Reports that pain has been well controlled with the infusion pump. Denies signs of local anesthetic toxicity. PNC dressing remains clean, dry, and intact. All questions answered. Reminded patient that the infusion should be discontinued and PNC removed whenever the "infusion complete" alert is seen on the display screen or by POD 5 (2/27/24) at 12pm, whichever comes first. Encouraged patient to call the [facility] 24/7 support line at [phone] or the [facility] line at [phone] for any [facility] related questions/issues. Verbalizes understanding. Will continue to follow up.        "

## 2024-02-26 NOTE — ANESTHESIA POST-OP PAIN MANAGEMENT
Acute Pain Service Progress Note    2/26/2024 1322    Contacted patient regarding [facility] follow up. Reports pain remains well controlled with the infusion. Denies s/s of local anesthetic toxicity. Dressing remains clean, dry, and intact. Reviewed removal process with patient. Plans to discontinue the infusion by tomorrow at 12pm and remove PNC. All questions answered. Encouraged patient to contact the [facility] 24/7 support line at [phone] or the [facility] line at [phone] should any further assistance be needed. Verbalizes understanding.

## 2024-03-04 ENCOUNTER — OFFICE VISIT (OUTPATIENT)
Dept: SPORTS MEDICINE | Facility: CLINIC | Age: 49
End: 2024-03-04
Payer: OTHER GOVERNMENT

## 2024-03-04 ENCOUNTER — HOSPITAL ENCOUNTER (OUTPATIENT)
Dept: RADIOLOGY | Facility: HOSPITAL | Age: 49
Discharge: HOME | End: 2024-03-04
Attending: ORTHOPAEDIC SURGERY
Payer: OTHER GOVERNMENT

## 2024-03-04 VITALS — SYSTOLIC BLOOD PRESSURE: 140 MMHG | HEART RATE: 91 BPM | DIASTOLIC BLOOD PRESSURE: 91 MMHG

## 2024-03-04 DIAGNOSIS — S83.412A SPRAIN OF MEDIAL COLLATERAL LIGAMENT OF LEFT KNEE, INITIAL ENCOUNTER: ICD-10-CM

## 2024-03-04 DIAGNOSIS — S83.242A ACUTE MEDIAL MENISCUS TEAR OF LEFT KNEE, INITIAL ENCOUNTER: ICD-10-CM

## 2024-03-04 DIAGNOSIS — M25.562 LEFT KNEE PAIN, UNSPECIFIED CHRONICITY: Primary | ICD-10-CM

## 2024-03-04 DIAGNOSIS — M23.92 INTERNAL DERANGEMENT OF LEFT KNEE: ICD-10-CM

## 2024-03-04 DIAGNOSIS — S83.282A ACUTE LATERAL MENISCUS TEAR OF LEFT KNEE, INITIAL ENCOUNTER: ICD-10-CM

## 2024-03-04 DIAGNOSIS — M25.462 EFFUSION, LEFT KNEE: ICD-10-CM

## 2024-03-04 DIAGNOSIS — S83.512A RUPTURE OF ANTERIOR CRUCIATE LIGAMENT OF LEFT KNEE, INITIAL ENCOUNTER: ICD-10-CM

## 2024-03-04 DIAGNOSIS — M25.562 LEFT KNEE PAIN, UNSPECIFIED CHRONICITY: ICD-10-CM

## 2024-03-04 PROCEDURE — 73560 X-RAY EXAM OF KNEE 1 OR 2: CPT | Mod: 26,LT,, | Performed by: RADIOLOGY

## 2024-03-04 PROCEDURE — 99999 PR PBB SHADOW E&M-EST. PATIENT-LVL III: CPT | Mod: PBBFAC,,, | Performed by: ORTHOPAEDIC SURGERY

## 2024-03-04 PROCEDURE — 73560 X-RAY EXAM OF KNEE 1 OR 2: CPT | Mod: TC,LT

## 2024-03-04 PROCEDURE — 99024 POSTOP FOLLOW-UP VISIT: CPT | Mod: ,,, | Performed by: ORTHOPAEDIC SURGERY

## 2024-03-04 PROCEDURE — 99213 OFFICE O/P EST LOW 20 MIN: CPT | Mod: PBBFAC,25 | Performed by: ORTHOPAEDIC SURGERY

## 2024-03-04 RX ORDER — CELECOXIB 200 MG/1
200 CAPSULE ORAL 2 TIMES DAILY
Qty: 60 CAPSULE | Refills: 6 | Status: SHIPPED | OUTPATIENT
Start: 2024-03-04 | End: 2024-05-20 | Stop reason: SDUPTHER

## 2024-03-04 NOTE — PROGRESS NOTES
Subjective:          Chief Complaint: Ramon Carlos is a 48 y.o. male who had no chief complaint listed for this encounter.    Patient comes in to clinic today for 2 week postop of the left knee. He is overall doing well, he has been compliant with crutch and brace use. He has been attending physical therapy per protocol.     DATE OF PROCEDURE: 2/22/2024     ATTENDING SURGEON: Surgeon(s) and Role:     * Paolo Gibbons MD - Primary     Assistants:  MD Syeda Liu PA-C     PREOPERATIVE DIAGNOSIS:  Left  Chondromalacia, (excludes patella) M94.29, Internal derangement knee M23.90, Tear, Lateral meniscus, acute S83.289A, Tear, Medial meniscus, acute S83.249A, and Anterior Cruciate Ligament Tear S83.510     POSTOPERATIVE DIAGNOSIS:   Left  Chondromalacia, (excludes patella) M94.29, Internal derangement knee M23.90, Tear, Lateral meniscus, acute S83.289A, Tear, Medial meniscus, acute S83.249A, and Anterior Cruciate Ligament Tear S83.510     PROCEDURES(S) PERFORMED: Complex  1. Left  Arthroscopy, anterior cruciate ligament reconstruction 53381  2.  Left  Ligament knee reconstruction, extra-articular 01801, MCL  3.  Left  Arthroscopy, with meniscus repair (medial AND lateral) 99378  4.  Left  Arthroscopy, debridement/shaving of articular cartilage (chondroplasty) 30471  5.  Left  Arthroscopy, with lysis of adhesions 50584     Complexity of the case increases based on the length of time between the initial operative intervention and date of injury with change in anatomy secondary to timing of surgery.     ANESTHESIA: General / LMA, Adductor block with catheter, Local 50cc JASON     FLUIDS IN THE CASE: 1500 ml     ESTIMATED BLOOD LOSS: Minimal     URINE OUTPUT: 0 ml     COMPLICATIONS: none     CONDITION ON RETURN TO RECOVERY ROOM: Good       Expand All Collapse All           IMPLANTS UTILIZED: [employer] equipment, Mitek Truespan x 8, Mitek Rigidloop Adjustable x 1, Mitek Yulissa screw 9 x 23 mm,  11 x 30 mm, Mitek Triple loaded Healix anchor x 1, Mitek Healix advance SP PEEK [place] x 2     GRAFT SOURCE:  BPTB , Autologous  Size 11.0 x 100 mm graft  Proximal bone plug 11.0 x 23 mm  Distal bone plug 11.0 x 24 mm     MTF medium cortical fibers     FINDINGS:      ARTICULAR CARTILAGE LESION(S):  Medial Femoral Condyle: ICRS Grade 0                 Size: none  Medial tibial plateau: ICRS Grade 0                 Size: none           Lateral Femoral Condyle: ICRS Grade 2                 Size: 2.0 x 2.0 cm  Lateral tibial plateau: ICRS Grade 0                 Size: none           Patellar surface: ICRS Grade 0                 Size: none  Trochlear groove: ICRS Grade 0                 Size: none              Review of Systems   Constitutional: Negative for fever and night sweats.   HENT:  Negative for hearing loss.    Eyes:  Negative for blurred vision and visual disturbance.   Cardiovascular:  Negative for chest pain and leg swelling.   Respiratory:  Negative for shortness of breath.    Endocrine: Negative for polyuria.   Hematologic/Lymphatic: Negative for bleeding problem.   Skin:  Negative for rash.   Musculoskeletal:  Negative for back pain, joint pain, joint swelling, muscle cramps and muscle weakness.   Gastrointestinal:  Negative for melena.   Genitourinary:  Negative for hematuria.   Neurological:  Negative for loss of balance, numbness and paresthesias.   Psychiatric/Behavioral:  Negative for altered mental status.                    Objective:        General: Ramon is well-developed, well-nourished, appears stated age, in no acute distress, alert and oriented to time, place and person.     General    Vitals reviewed.  Constitutional: He is oriented to person, place, and time. He appears well-developed and well-nourished. No distress.   HENT:   Mouth/Throat: No oropharyngeal exudate.   Eyes: Right eye exhibits no discharge. Left eye exhibits no discharge.   Pulmonary/Chest: Effort normal and breath sounds  normal. No respiratory distress.   Neurological: He is alert and oriented to person, place, and time. He has normal reflexes. No cranial nerve deficit. Coordination normal.   Psychiatric: He has a normal mood and affect. His behavior is normal. Judgment and thought content normal.     General Musculoskeletal Exam   Gait: abnormal       Right Knee Exam   Right knee exam is normal.    Inspection   Erythema: absent  Scars: absent  Swelling: absent  Effusion: absent  Deformity: absent  Bruising: absent    Tenderness   The patient is experiencing no tenderness.     Range of Motion   Extension:  0   Flexion:  140     Tests   Meniscus   Kim:  Medial - negative Lateral - negative  Ligament Examination   Lachman: normal (-1 to 2mm)   PCL-Posterior Drawer: normal (0 to 2mm)     MCL - Valgus: normal (0 to 2mm)  LCL - Varus: normal  Pivot Shift: normal (Equal)  Reverse Pivot Shift: normal (Equal)  Dial Test at 30 degrees: normal (< 5 degrees)  Dial Test at 90 degrees: normal (< 5 degrees)  Posterior Sag Test: negative  Posterolateral Corner: stable  Patella   Patellar apprehension: negative  Passive Patellar Tilt: neutral  Patellar Tracking: normal  Patellar Glide (quadrants): Lateral - 1   Medial - 2  Q-Angle at 90 degrees: normal  Patellar Grind: negative  J-Sign: none    Other   Meniscal Cyst: absent  Popliteal (Baker's) Cyst: absent  Sensation: normal    Comments:          Left Knee Exam     Inspection   Erythema: absent  Scars: present  Swelling: present  Effusion: absent  Deformity: absent  Bruising: present    Tenderness   The patient tender to palpation of the patellar tendon.    Range of Motion   Extension:  0   Flexion:  70     Tests   Meniscus   Kim:  Medial - negative Lateral - negative  Stability   Lachman: normal (-1 to 2mm)   PCL-Posterior Drawer: normal (0 to 2mm)  MCL - Valgus: normal (0 to 2mm)  LCL - Varus: normal (0 to 2mm)  Pivot Shift: normal (Equal)  Reverse Pivot Shift: normal (Equal)  Dial  Test at 30 degrees: normal (< 5 degrees)  Dial Test at 90 degrees: normal (< 5 degrees)  Posterior Sag Test: negative  Posterolateral Corner: stable  Patella   Patellar apprehension: negative  Passive Patellar Tilt: neutral  Patellar Tracking: normal  Patellar Glide (Quadrants): Lateral - 1 Medial - 2  Q-Angle at 90 degrees: normal  Patellar Grind: negative  J-Sign: J sign absent    Other   Meniscal Cyst: absent  Popliteal (Baker's) Cyst: absent  Sensation: normal    Comments:  Incision clean, dry, no drainage  Sutures intact and removed today  No sign of infection  Mild swelling  Compartments soft  Neurovascular status intact in extremity    Right Hip Exam     Tests   Dexter: negative  Left Hip Exam     Tests   Dexter: negative          Reflexes     Left Side  Achilles:  2+  Quadriceps:  2+    Right Side   Achilles:  2+  Quadriceps:  2+    Vascular Exam     Right Pulses  Dorsalis Pedis:      2+  Posterior Tibial:      2+        Left Pulses  Dorsalis Pedis:      2+  Posterior Tibial:      2+                  Assessment:       Encounter Diagnoses   Name Primary?    Left knee pain, unspecified chronicity Yes    Acute medial meniscus tear of left knee, initial encounter     Internal derangement of left knee     Sprain of medial collateral ligament of left knee, initial encounter     Acute lateral meniscus tear of left knee, initial encounter     Effusion, left knee     Rupture of anterior cruciate ligament of left knee, initial encounter           Plan:       1. RTC in 4 weeks with Dr. Paolo Gibbons. IKDC, SF-12 and KOOS was not filled out today in clinic. Patient will fill out IKDC, SF-12 and KOOS on return.    2. Medications: Refills of the following Rx were sent to patients preferred Pharmacy:  No Refills Needed Today    3. Physical Therapy: Continue/Begin: Continue following protocol below     Physical Therapy:    Brace ROM:  Week 1:    -10-45 degrees  Week 2-3: -10-60 degrees  Week 3-4: -10-90 degrees  Week 4-5: -  degrees  Week 5-6: - degrees    Weightbearing: All WB immobilizer locked in extension with gait for 6 weeks  Week 1-2: Toe touch to 25% weightbearing  Week 2-3: 25-50% PWB   Week 4-5 : 50% to full weightbearing  Week 6 - transition out of immobilizer    Exercycle and elliptical initiated at 6-8 weeks  CORE program at 6-8 weeks    No open chain rehabilitation for 3 months  Limited open chain rehab. 3-4 months    Running and cutting exercises at 4-6 months based on single leg balance ability  Full return planned at 9 months    4. HEP: N/A    5. Procedures/Procedural Planning:   N/A    6. DME: T-Scope adjusted to 60 degrees flexion today and can be adjusted per protocol listed above     7. Work/Sport Status: physician     8. Visit Summary: Patient doing well 2 weeks postop. Adjusted Tscope brace.                           [facility] patient questionnaires have been collected today.

## 2024-04-05 NOTE — PROGRESS NOTES
Subjective:          Chief Complaint: Ramon Carlos is a 48 y.o. male who had no chief complaint listed for this encounter.    Patient comes in to clinic today for 6 week postop of the left knee. He is overall doing well, he has been compliant with crutch and brace use. Up to 100 degrees of flexion and full weight bearing. He has been attending physical therapy per protocol. Doing well, but wanting to work on more flexion.    DATE OF PROCEDURE: 2/22/2024     ATTENDING SURGEON: Surgeon(s) and Role:     * Paolo Gibbons MD - Primary     Assistants:  MD Syeda Liu PA-C     PREOPERATIVE DIAGNOSIS:  Left  Chondromalacia, (excludes patella) M94.29, Internal derangement knee M23.90, Tear, Lateral meniscus, acute S83.289A, Tear, Medial meniscus, acute S83.249A, and Anterior Cruciate Ligament Tear S83.510     POSTOPERATIVE DIAGNOSIS:   Left  Chondromalacia, (excludes patella) M94.29, Internal derangement knee M23.90, Tear, Lateral meniscus, acute S83.289A, Tear, Medial meniscus, acute S83.249A, and Anterior Cruciate Ligament Tear S83.510     PROCEDURES(S) PERFORMED: Complex  1. Left  Arthroscopy, anterior cruciate ligament reconstruction 03829  2.  Left  Ligament knee reconstruction, extra-articular 84560, MCL  3.  Left  Arthroscopy, with meniscus repair (medial AND lateral) 10251  4.  Left  Arthroscopy, debridement/shaving of articular cartilage (chondroplasty) 22759  5.  Left  Arthroscopy, with lysis of adhesions 94924     Complexity of the case increases based on the length of time between the initial operative intervention and date of injury with change in anatomy secondary to timing of surgery.     ANESTHESIA: General / LMA, Adductor block with catheter, Local 50cc JASON     FLUIDS IN THE CASE: 1500 ml     ESTIMATED BLOOD LOSS: Minimal     URINE OUTPUT: 0 ml     COMPLICATIONS: none     CONDITION ON RETURN TO RECOVERY ROOM: Good       Expand All Collapse All           IMPLANTS UTILIZED:  [employer] equipment, Mitek Truespan x 8, Mitek Rigidloop Adjustable x 1, Mitek Yulissa screw 9 x 23 mm, 11 x 30 mm, Mitek Triple loaded Healix anchor x 1, Mitek Healix advance SP PEEK [place] x 2     GRAFT SOURCE:  BPTB , Autologous  Size 11.0 x 100 mm graft  Proximal bone plug 11.0 x 23 mm  Distal bone plug 11.0 x 24 mm     MTF medium cortical fibers     FINDINGS:      ARTICULAR CARTILAGE LESION(S):  Medial Femoral Condyle: ICRS Grade 0                 Size: none  Medial tibial plateau: ICRS Grade 0                 Size: none           Lateral Femoral Condyle: ICRS Grade 2                 Size: 2.0 x 2.0 cm  Lateral tibial plateau: ICRS Grade 0                 Size: none           Patellar surface: ICRS Grade 0                 Size: none  Trochlear groove: ICRS Grade 0                 Size: none              Review of Systems   Constitutional: Negative for fever and night sweats.   HENT:  Negative for hearing loss.    Eyes:  Negative for blurred vision and visual disturbance.   Cardiovascular:  Negative for chest pain and leg swelling.   Respiratory:  Negative for shortness of breath.    Endocrine: Negative for polyuria.   Hematologic/Lymphatic: Negative for bleeding problem.   Skin:  Negative for rash.   Musculoskeletal:  Negative for back pain, joint pain, joint swelling, muscle cramps and muscle weakness.   Gastrointestinal:  Negative for melena.   Genitourinary:  Negative for hematuria.   Neurological:  Negative for loss of balance, numbness and paresthesias.   Psychiatric/Behavioral:  Negative for altered mental status.                    Objective:        General: Ramon is well-developed, well-nourished, appears stated age, in no acute distress, alert and oriented to time, place and person.     General    Vitals reviewed.  Constitutional: He is oriented to person, place, and time. He appears well-developed and well-nourished. No distress.   HENT:   Mouth/Throat: No oropharyngeal exudate.   Eyes: Right eye  exhibits no discharge. Left eye exhibits no discharge.   Pulmonary/Chest: Effort normal and breath sounds normal. No respiratory distress.   Neurological: He is alert and oriented to person, place, and time. He has normal reflexes. No cranial nerve deficit. Coordination normal.   Psychiatric: He has a normal mood and affect. His behavior is normal. Judgment and thought content normal.     General Musculoskeletal Exam   Gait: abnormal       Right Knee Exam   Right knee exam is normal.    Inspection   Erythema: absent  Scars: absent  Swelling: absent  Effusion: absent  Deformity: absent  Bruising: absent    Tenderness   The patient is experiencing no tenderness.     Range of Motion   Extension:  0   Flexion:  140     Tests   Meniscus   Kim:  Medial - negative Lateral - negative  Ligament Examination   Lachman: normal (-1 to 2mm)   PCL-Posterior Drawer: normal (0 to 2mm)     MCL - Valgus: normal (0 to 2mm)  LCL - Varus: normal  Pivot Shift: normal (Equal)  Reverse Pivot Shift: normal (Equal)  Dial Test at 30 degrees: normal (< 5 degrees)  Dial Test at 90 degrees: normal (< 5 degrees)  Posterior Sag Test: negative  Posterolateral Corner: stable  Patella   Patellar apprehension: negative  Passive Patellar Tilt: neutral  Patellar Tracking: normal  Patellar Glide (quadrants): Lateral - 1   Medial - 2  Q-Angle at 90 degrees: normal  Patellar Grind: negative  J-Sign: none    Other   Meniscal Cyst: absent  Popliteal (Baker's) Cyst: absent  Sensation: normal    Comments:          Left Knee Exam     Inspection   Erythema: absent  Scars: present  Swelling: present  Effusion: absent  Deformity: absent  Bruising: present    Tenderness   The patient tender to palpation of the patellar tendon and MCL.    Range of Motion   Extension:  0   Flexion:  100     Tests   Meniscus   Kim:  Medial - negative Lateral - negative  Stability   Lachman: normal (-1 to 2mm)   PCL-Posterior Drawer: normal (0 to 2mm)  MCL - Valgus: normal (0  to 2mm)  LCL - Varus: normal (0 to 2mm)  Pivot Shift: normal (Equal)  Reverse Pivot Shift: normal (Equal)  Dial Test at 30 degrees: normal (< 5 degrees)  Dial Test at 90 degrees: normal (< 5 degrees)  Posterior Sag Test: negative  Posterolateral Corner: stable  Patella   Patellar apprehension: negative  Passive Patellar Tilt: neutral  Patellar Tracking: normal  Patellar Glide (Quadrants): Lateral - 1 Medial - 2  Q-Angle at 90 degrees: normal  Patellar Grind: negative  J-Sign: J sign absent    Other   Meniscal Cyst: absent  Popliteal (Baker's) Cyst: absent  Sensation: normal    Comments:  Incision healing well. No dehiscence or drainage.    Right Hip Exam     Tests   Dexter: negative  Left Hip Exam     Tests   Dexter: negative          Reflexes     Left Side  Achilles:  2+  Quadriceps:  2+    Right Side   Achilles:  2+  Quadriceps:  2+    Vascular Exam     Right Pulses  Dorsalis Pedis:      2+  Posterior Tibial:      2+        Left Pulses  Dorsalis Pedis:      2+  Posterior Tibial:      2+                  Assessment:       Encounter Diagnoses   Name Primary?    Left knee pain, unspecified chronicity     Rupture of anterior cruciate ligament of left knee, subsequent encounter Yes    Internal derangement of left knee             Plan:       1. RTC in 6 weeks with Dr. Paolo Gibbons. IKDC, SF-12 and KOOS was not filled out today in clinic. Patient will fill out IKDC, SF-12 and KOOS on return.    2. Medications: Refills of the following Rx were sent to patients preferred Pharmacy:  No Refills Needed Today    3. Physical Therapy: Continue/Begin: Continue following protocol below (New order sent - okay for range of motion as tolerated and weight bearing as tolerated. Discontinue T-scope brace and crutches)    Physical Therapy:    Brace ROM:  Week 1:    -10-45 degrees  Week 2-3: -10-60 degrees  Week 3-4: -10-90 degrees  Week 4-5: - degrees  Week 5-6: - degrees    Weightbearing: All WB immobilizer locked in extension  with gait for 6 weeks  Week 1-2: Toe touch to 25% weightbearing  Week 2-3: 25-50% PWB   Week 4-5 : 50% to full weightbearing  Week 6 - transition out of immobilizer    Exercycle and elliptical initiated at 6-8 weeks  CORE program at 6-8 weeks    No open chain rehabilitation for 3 months  Limited open chain rehab. 3-4 months    Running and cutting exercises at 4-6 months based on single leg balance ability  Full return planned at 9 months    4. HEP: N/A    5. Procedures/Procedural Planning:   N/A    6. DME: Visco skin given today    7. Work/Sport Status: physician     8. Visit Summary: Patient doing well 6 weeks postop. Continue physical therapy with full weight bearing and range of motion as tolerated. D/c T-scope and crutches. No longer needs the aspirin. Take Celebrex as needed.                          [facility] patient questionnaires have been collected today.

## 2024-04-08 ENCOUNTER — HOSPITAL ENCOUNTER (OUTPATIENT)
Dept: RADIOLOGY | Facility: HOSPITAL | Age: 49
Discharge: HOME | End: 2024-04-08
Attending: ORTHOPAEDIC SURGERY
Payer: OTHER GOVERNMENT

## 2024-04-08 ENCOUNTER — OFFICE VISIT (OUTPATIENT)
Dept: SPORTS MEDICINE | Facility: CLINIC | Age: 49
End: 2024-04-08
Payer: OTHER GOVERNMENT

## 2024-04-08 VITALS
BODY MASS INDEX: 26.85 KG/M2 | DIASTOLIC BLOOD PRESSURE: 95 MMHG | HEIGHT: 71 IN | SYSTOLIC BLOOD PRESSURE: 142 MMHG | WEIGHT: 191.81 LBS | HEART RATE: 76 BPM

## 2024-04-08 DIAGNOSIS — M25.562 LEFT KNEE PAIN, UNSPECIFIED CHRONICITY: ICD-10-CM

## 2024-04-08 DIAGNOSIS — S83.512D RUPTURE OF ANTERIOR CRUCIATE LIGAMENT OF LEFT KNEE, SUBSEQUENT ENCOUNTER: Primary | ICD-10-CM

## 2024-04-08 DIAGNOSIS — M23.92 INTERNAL DERANGEMENT OF LEFT KNEE: ICD-10-CM

## 2024-04-08 PROCEDURE — 99999 PR PBB SHADOW E&M-EST. PATIENT-LVL IV: CPT | Mod: PBBFAC,,, | Performed by: ORTHOPAEDIC SURGERY

## 2024-04-08 PROCEDURE — 73564 X-RAY EXAM KNEE 4 OR MORE: CPT | Mod: TC,50

## 2024-04-08 PROCEDURE — 99214 OFFICE O/P EST MOD 30 MIN: CPT | Mod: PBBFAC,25 | Performed by: ORTHOPAEDIC SURGERY

## 2024-04-08 PROCEDURE — 99024 POSTOP FOLLOW-UP VISIT: CPT | Mod: ,,, | Performed by: ORTHOPAEDIC SURGERY

## 2024-04-08 PROCEDURE — 73564 X-RAY EXAM KNEE 4 OR MORE: CPT | Mod: 26,50,, | Performed by: INTERNAL MEDICINE

## 2024-04-22 PROBLEM — M25.562 LEFT KNEE PAIN: Status: ACTIVE | Noted: 2024-04-22

## 2024-05-20 ENCOUNTER — HOSPITAL ENCOUNTER (OUTPATIENT)
Dept: RADIOLOGY | Facility: HOSPITAL | Age: 49
Discharge: HOME | End: 2024-05-20
Attending: ORTHOPAEDIC SURGERY
Payer: OTHER GOVERNMENT

## 2024-05-20 ENCOUNTER — OFFICE VISIT (OUTPATIENT)
Dept: SPORTS MEDICINE | Facility: CLINIC | Age: 49
End: 2024-05-20
Payer: OTHER GOVERNMENT

## 2024-05-20 VITALS
SYSTOLIC BLOOD PRESSURE: 125 MMHG | HEART RATE: 79 BPM | DIASTOLIC BLOOD PRESSURE: 81 MMHG | WEIGHT: 187.38 LBS | BODY MASS INDEX: 26.23 KG/M2 | HEIGHT: 71 IN

## 2024-05-20 DIAGNOSIS — M25.562 LEFT KNEE PAIN, UNSPECIFIED CHRONICITY: ICD-10-CM

## 2024-05-20 DIAGNOSIS — M25.562 LEFT KNEE PAIN, UNSPECIFIED CHRONICITY: Primary | ICD-10-CM

## 2024-05-20 DIAGNOSIS — M23.92 INTERNAL DERANGEMENT OF LEFT KNEE: ICD-10-CM

## 2024-05-20 DIAGNOSIS — M25.462 EFFUSION, LEFT KNEE: ICD-10-CM

## 2024-05-20 DIAGNOSIS — S83.512A RUPTURE OF ANTERIOR CRUCIATE LIGAMENT OF LEFT KNEE, INITIAL ENCOUNTER: ICD-10-CM

## 2024-05-20 PROCEDURE — 73564 X-RAY EXAM KNEE 4 OR MORE: CPT | Mod: 26,50,, | Performed by: RADIOLOGY

## 2024-05-20 PROCEDURE — 99999 PR PBB SHADOW E&M-EST. PATIENT-LVL III: CPT | Mod: PBBFAC,,, | Performed by: ORTHOPAEDIC SURGERY

## 2024-05-20 PROCEDURE — 99213 OFFICE O/P EST LOW 20 MIN: CPT | Mod: PBBFAC,25 | Performed by: ORTHOPAEDIC SURGERY

## 2024-05-20 PROCEDURE — 99024 POSTOP FOLLOW-UP VISIT: CPT | Mod: S$PBB,,, | Performed by: ORTHOPAEDIC SURGERY

## 2024-05-20 PROCEDURE — 73564 X-RAY EXAM KNEE 4 OR MORE: CPT | Mod: TC,50

## 2024-05-20 RX ORDER — CELECOXIB 200 MG/1
200 CAPSULE ORAL 2 TIMES DAILY WITH MEALS
Qty: 60 CAPSULE | Refills: 1 | Status: SHIPPED | OUTPATIENT
Start: 2024-05-20

## 2024-05-20 NOTE — PROGRESS NOTES
Subjective:          Chief Complaint: Ramon Carlos is a 48 y.o. male who had concerns including Post-op Evaluation of the Left Knee.    Patient comes in to clinic today for 12 week postop of the left knee. He is overall doing well, he has been compliant with crutch and brace use. He has been attending physical therapy per protocol.     DATE OF PROCEDURE: 2/22/2024     ATTENDING SURGEON: Surgeon(s) and Role:     * Paolo Gibbons MD - Primary     Assistants:  MD Syeda Liu PA-C     PREOPERATIVE DIAGNOSIS:  Left  Chondromalacia, (excludes patella) M94.29, Internal derangement knee M23.90, Tear, Lateral meniscus, acute S83.289A, Tear, Medial meniscus, acute S83.249A, and Anterior Cruciate Ligament Tear S83.510     POSTOPERATIVE DIAGNOSIS:   Left  Chondromalacia, (excludes patella) M94.29, Internal derangement knee M23.90, Tear, Lateral meniscus, acute S83.289A, Tear, Medial meniscus, acute S83.249A, and Anterior Cruciate Ligament Tear S83.510     PROCEDURES(S) PERFORMED: Complex  1. Left  Arthroscopy, anterior cruciate ligament reconstruction 05431  2.  Left  Ligament knee reconstruction, extra-articular 40664, MCL  3.  Left  Arthroscopy, with meniscus repair (medial AND lateral) 35769  4.  Left  Arthroscopy, debridement/shaving of articular cartilage (chondroplasty) 07974  5.  Left  Arthroscopy, with lysis of adhesions 72994     Complexity of the case increases based on the length of time between the initial operative intervention and date of injury with change in anatomy secondary to timing of surgery.     ANESTHESIA: General / LMA, Adductor block with catheter, Local 50cc JASON     FLUIDS IN THE CASE: 1500 ml     ESTIMATED BLOOD LOSS: Minimal     URINE OUTPUT: 0 ml     COMPLICATIONS: none     CONDITION ON RETURN TO RECOVERY ROOM: Good       Expand All Collapse All           IMPLANTS UTILIZED: [employer] equipment, Mitek Truespan x 8, Mitek Rigidloop Adjustable x 1, Mitek Yulissa screw  9 x 23 mm, 11 x 30 mm, Mitek Triple loaded Healix anchor x 1, Mitek Healix advance SP PEEK [place] x 2     GRAFT SOURCE:  BPTB , Autologous  Size 11.0 x 100 mm graft  Proximal bone plug 11.0 x 23 mm  Distal bone plug 11.0 x 24 mm     MTF medium cortical fibers     FINDINGS:      ARTICULAR CARTILAGE LESION(S):  Medial Femoral Condyle: ICRS Grade 0                 Size: none  Medial tibial plateau: ICRS Grade 0                 Size: none           Lateral Femoral Condyle: ICRS Grade 2                 Size: 2.0 x 2.0 cm  Lateral tibial plateau: ICRS Grade 0                 Size: none           Patellar surface: ICRS Grade 0                 Size: none  Trochlear groove: ICRS Grade 0                 Size: none            Review of Systems   Constitutional: Negative for fever and night sweats.   HENT:  Negative for hearing loss.    Eyes:  Negative for blurred vision and visual disturbance.   Cardiovascular:  Negative for chest pain and leg swelling.   Respiratory:  Negative for shortness of breath.    Endocrine: Negative for polyuria.   Hematologic/Lymphatic: Negative for bleeding problem.   Skin:  Negative for rash.   Musculoskeletal:  Negative for back pain, joint pain, joint swelling, muscle cramps and muscle weakness.   Gastrointestinal:  Negative for melena.   Genitourinary:  Negative for hematuria.   Neurological:  Negative for loss of balance, numbness and paresthesias.   Psychiatric/Behavioral:  Negative for altered mental status.        Pain Related Questions  Over the past 3 days, what was your average pain during activity? (I.e. running, jogging, walking, climbing stairs, getting dressed, ect.): 3  Over the past 3 days, what was your highest pain level?: 3  Over the past 3 days, what was your lowest pain level? : 0    Other  How many nights a week are you awakened by your affected body part?: 0      Objective:        General: Ramon is well-developed, well-nourished, appears stated age, in no acute distress,  alert and oriented to time, place and person.     General    Vitals reviewed.  Constitutional: He is oriented to person, place, and time. He appears well-developed and well-nourished. No distress.   HENT:   Mouth/Throat: No oropharyngeal exudate.   Eyes: Right eye exhibits no discharge. Left eye exhibits no discharge.   Pulmonary/Chest: Effort normal and breath sounds normal. No respiratory distress.   Neurological: He is alert and oriented to person, place, and time. He has normal reflexes. No cranial nerve deficit. Coordination normal.   Psychiatric: He has a normal mood and affect. His behavior is normal. Judgment and thought content normal.     General Musculoskeletal Exam   Gait: normal       Right Knee Exam   Right knee exam is normal.    Inspection   Erythema: absent  Scars: absent  Swelling: absent  Effusion: absent  Deformity: absent  Bruising: absent    Tenderness   The patient is experiencing no tenderness.     Range of Motion   Extension:  0   Flexion:  140     Tests   Meniscus   Kim:  Medial - negative Lateral - negative  Ligament Examination   Lachman: normal (-1 to 2mm)   PCL-Posterior Drawer: normal (0 to 2mm)     MCL - Valgus: normal (0 to 2mm)  LCL - Varus: normal  Pivot Shift: normal (Equal)  Reverse Pivot Shift: normal (Equal)  Dial Test at 30 degrees: normal (< 5 degrees)  Dial Test at 90 degrees: normal (< 5 degrees)  Posterior Sag Test: negative  Posterolateral Corner: stable  Patella   Patellar apprehension: negative  Passive Patellar Tilt: neutral  Patellar Tracking: normal  Patellar Glide (quadrants): Lateral - 1   Medial - 2  Q-Angle at 90 degrees: normal  Patellar Grind: negative  J-Sign: none    Other   Meniscal Cyst: absent  Popliteal (Baker's) Cyst: absent  Sensation: normal    Comments:          Left Knee Exam     Inspection   Erythema: absent  Scars: present  Swelling: present  Effusion: absent  Deformity: absent  Bruising: present    Tenderness   The patient tender to palpation  of the patellar tendon.    Range of Motion   Extension:  0   Flexion:  120     Tests   Meniscus   Kim:  Medial - negative Lateral - negative  Stability   Lachman: normal (-1 to 2mm)   PCL-Posterior Drawer: normal (0 to 2mm)  MCL - Valgus: normal (0 to 2mm)  LCL - Varus: normal (0 to 2mm)  Pivot Shift: normal (Equal)  Reverse Pivot Shift: normal (Equal)  Dial Test at 30 degrees: normal (< 5 degrees)  Dial Test at 90 degrees: normal (< 5 degrees)  Posterior Sag Test: negative  Posterolateral Corner: stable  Patella   Patellar apprehension: negative  Passive Patellar Tilt: neutral  Patellar Tracking: normal  Patellar Glide (Quadrants): Lateral - 1 Medial - 2  Q-Angle at 90 degrees: normal  Patellar Grind: negative  J-Sign: J sign absent    Other   Meniscal Cyst: absent  Popliteal (Baker's) Cyst: absent  Sensation: normal    Comments:  Incision clean, dry, no drainage  Sutures intact and removed today  No sign of infection  Mild swelling  Compartments soft  Neurovascular status intact in extremity    Right Hip Exam     Tests   Dexter: negative  Left Hip Exam     Tests   Dexter: negative          Muscle Strength   Left Lower Extremity   Quadriceps:  4/5   Hamstrin/5     Reflexes     Left Side  Achilles:  2+  Quadriceps:  2+    Right Side   Achilles:  2+  Quadriceps:  2+    Vascular Exam     Right Pulses  Dorsalis Pedis:      2+  Posterior Tibial:      2+        Left Pulses  Dorsalis Pedis:      2+  Posterior Tibial:      2+                Assessment:       Encounter Diagnosis   Name Primary?    Left knee pain, unspecified chronicity Yes          Plan:       1. RTC in 12 weeks with Dr. Paolo Gibbons. IKDC, SF-12 and KOOS was not filled out today in clinic. Patient will fill out IKDC, SF-12 and KOOS on return.    2. Medications: Refills of the following Rx were sent to patients preferred Pharmacy:  Refill of celebrex today. Continue to take     3. Physical Therapy: Continue/Begin: Continue following protocol below      Physical Therapy:    Brace ROM:  Week 1:    -10-45 degrees  Week 2-3: -10-60 degrees  Week 3-4: -10-90 degrees  Week 4-5: - degrees  Week 5-6: - degrees    Weightbearing: All WB immobilizer locked in extension with gait for 6 weeks  Week 1-2: Toe touch to 25% weightbearing  Week 2-3: 25-50% PWB   Week 4-5 : 50% to full weightbearing  Week 6 - transition out of immobilizer    Exercycle and elliptical initiated at 6-8 weeks  CORE program at 6-8 weeks    No open chain rehabilitation for 3 months  Limited open chain rehab. 3-4 months    Running and cutting exercises at 4-6 months based on single leg balance ability  Full return planned at 9 months    4. HEP: N/A    5. Procedures/Procedural Planning:   N/A    6. DME: no bracing needed     7. Work/Sport Status: physician     8. Visit Summary: Patient doing well continue ROM progression and single leg balance exercises with goal to return to running in 3 months time. Incorporate bridges and marching bridges with PT for core strengthening.                            [facility] patient questionnaires have been collected today.

## 2024-06-20 DIAGNOSIS — J20.8 ACUTE BACTERIAL BRONCHITIS: Primary | ICD-10-CM

## 2024-06-20 DIAGNOSIS — B96.89 ACUTE BACTERIAL BRONCHITIS: Primary | ICD-10-CM

## 2024-06-20 RX ORDER — METHYLPREDNISOLONE 4 MG/1
TABLET ORAL
Qty: 1 EACH | Refills: 0 | Status: SHIPPED | OUTPATIENT
Start: 2024-06-20

## 2024-06-20 RX ORDER — DOXYCYCLINE 100 MG/1
100 CAPSULE ORAL EVERY 12 HOURS
Qty: 20 CAPSULE | Refills: 0 | Status: SHIPPED | OUTPATIENT
Start: 2024-06-20 | End: 2024-06-30

## 2024-08-15 NOTE — PROGRESS NOTES
Subjective:          Chief Complaint: Ramon Carlos is a 48 y.o. male who had concerns including Pain of the Left Knee.    Patient comes in to clinic today for 6 month postop of the left knee.     DATE OF PROCEDURE: 2/22/2024     ATTENDING SURGEON: Surgeon(s) and Role:     * Paolo Gibbons MD - Primary     Assistants:  MD Syeda Liu PA-C     PREOPERATIVE DIAGNOSIS:  Left  Chondromalacia, (excludes patella) M94.29, Internal derangement knee M23.90, Tear, Lateral meniscus, acute S83.289A, Tear, Medial meniscus, acute S83.249A, and Anterior Cruciate Ligament Tear S83.510     POSTOPERATIVE DIAGNOSIS:   Left  Chondromalacia, (excludes patella) M94.29, Internal derangement knee M23.90, Tear, Lateral meniscus, acute S83.289A, Tear, Medial meniscus, acute S83.249A, and Anterior Cruciate Ligament Tear S83.510     PROCEDURES(S) PERFORMED: Complex  1. Left  Arthroscopy, anterior cruciate ligament reconstruction 82281  2.  Left  Ligament knee reconstruction, extra-articular 60633, MCL  3.  Left  Arthroscopy, with meniscus repair (medial AND lateral) 70370  4.  Left  Arthroscopy, debridement/shaving of articular cartilage (chondroplasty) 54204  5.  Left  Arthroscopy, with lysis of adhesions 63476     Complexity of the case increases based on the length of time between the initial operative intervention and date of injury with change in anatomy secondary to timing of surgery.     ANESTHESIA: General / LMA, Adductor block with catheter, Local 50cc JASON     FLUIDS IN THE CASE: 1500 ml     ESTIMATED BLOOD LOSS: Minimal     URINE OUTPUT: 0 ml     COMPLICATIONS: none     CONDITION ON RETURN TO RECOVERY ROOM: Good       Expand All Collapse All           IMPLANTS UTILIZED: [employer] equipment, Mitek Truespan x 8, Mitek Rigidloop Adjustable x 1, Mitek Yulissa screw 9 x 23 mm, 11 x 30 mm, Mitek Triple loaded Healix anchor x 1, Mitek Healix advance SP PEEK [place] x 2     GRAFT SOURCE:  BPTB ,  Autologous  Size 11.0 x 100 mm graft  Proximal bone plug 11.0 x 23 mm  Distal bone plug 11.0 x 24 mm     MTF medium cortical fibers     FINDINGS:      ARTICULAR CARTILAGE LESION(S):  Medial Femoral Condyle: ICRS Grade 0                 Size: none  Medial tibial plateau: ICRS Grade 0                 Size: none           Lateral Femoral Condyle: ICRS Grade 2                 Size: 2.0 x 2.0 cm  Lateral tibial plateau: ICRS Grade 0                 Size: none           Patellar surface: ICRS Grade 0                 Size: none  Trochlear groove: ICRS Grade 0                 Size: none              Review of Systems   Constitutional: Negative for fever and night sweats.   HENT:  Negative for hearing loss.    Eyes:  Negative for blurred vision and visual disturbance.   Cardiovascular:  Negative for chest pain and leg swelling.   Respiratory:  Negative for shortness of breath.    Endocrine: Negative for polyuria.   Hematologic/Lymphatic: Negative for bleeding problem.   Skin:  Negative for rash.   Musculoskeletal:  Negative for back pain, joint pain, joint swelling, muscle cramps and muscle weakness.   Gastrointestinal:  Negative for melena.   Genitourinary:  Negative for hematuria.   Neurological:  Negative for loss of balance, numbness and paresthesias.   Psychiatric/Behavioral:  Negative for altered mental status.                    Objective:        General: Ramon is well-developed, well-nourished, appears stated age, in no acute distress, alert and oriented to time, place and person.     General    Vitals reviewed.  Constitutional: He is oriented to person, place, and time. He appears well-developed and well-nourished. No distress.   HENT:   Mouth/Throat: No oropharyngeal exudate.   Eyes: Right eye exhibits no discharge. Left eye exhibits no discharge.   Pulmonary/Chest: Effort normal and breath sounds normal. No respiratory distress.   Neurological: He is alert and oriented to person, place, and time. He has normal  reflexes. No cranial nerve deficit. Coordination normal.   Psychiatric: He has a normal mood and affect. His behavior is normal. Judgment and thought content normal.     General Musculoskeletal Exam   Gait: abnormal       Right Knee Exam   Right knee exam is normal.    Inspection   Erythema: absent  Scars: absent  Swelling: absent  Effusion: absent  Deformity: absent  Bruising: absent    Tenderness   The patient is experiencing no tenderness.     Range of Motion   Extension:  0   Flexion:  150     Tests   Meniscus   Kim:  Medial - negative Lateral - negative  Ligament Examination   Lachman: normal (-1 to 2mm)   PCL-Posterior Drawer: normal (0 to 2mm)     MCL - Valgus: normal (0 to 2mm)  LCL - Varus: normal  Pivot Shift: normal (Equal)  Reverse Pivot Shift: normal (Equal)  Dial Test at 30 degrees: normal (< 5 degrees)  Dial Test at 90 degrees: normal (< 5 degrees)  Posterior Sag Test: negative  Posterolateral Corner: stable  Patella   Patellar apprehension: negative  Passive Patellar Tilt: neutral  Patellar Tracking: normal  Patellar Glide (quadrants): Lateral - 1   Medial - 2  Q-Angle at 90 degrees: normal  Patellar Grind: negative  J-Sign: none    Other   Meniscal Cyst: absent  Popliteal (Baker's) Cyst: absent  Sensation: normal    Comments:          Left Knee Exam     Inspection   Erythema: absent  Scars: present  Swelling: absent  Effusion: present  Deformity: absent  Bruising: absent    Tenderness   The patient tender to palpation of the patellar tendon and MCL.    Range of Motion   Extension:  0   Flexion:  140 abnormal     Tests   Meniscus   Kim:  Medial - negative Lateral - negative  Stability   Lachman: normal (-1 to 2mm)   PCL-Posterior Drawer: normal (0 to 2mm)  MCL - Valgus: normal (0 to 2mm)  LCL - Varus: normal (0 to 2mm)  Pivot Shift: normal (Equal)  Reverse Pivot Shift: normal (Equal)  Dial Test at 30 degrees: normal (< 5 degrees)  Dial Test at 90 degrees: normal (< 5 degrees)  Posterior Sag  Test: negative  Posterolateral Corner: stable  Patella   Patellar apprehension: negative  Passive Patellar Tilt: neutral  Patellar Tracking: normal  Patellar Glide (Quadrants): Lateral - 1 Medial - 2  Q-Angle at 90 degrees: normal  Patellar Grind: negative  J-Sign: J sign absent    Other   Meniscal Cyst: absent  Popliteal (Baker's) Cyst: absent  Sensation: normal    Right Hip Exam     Tests   Dexter: negative  Left Hip Exam     Tests   Dexter: negative          Reflexes     Left Side  Achilles:  2+  Quadriceps:  2+    Right Side   Achilles:  2+  Quadriceps:  2+    Vascular Exam     Right Pulses  Dorsalis Pedis:      2+  Posterior Tibial:      2+        Left Pulses  Dorsalis Pedis:      2+  Posterior Tibial:      2+          Radiographic Findings:    X-ray Knee Ortho Bilateral with Flexion  Narrative: EXAMINATION:  XR KNEE ORTHO BILAT WITH FLEXION    CLINICAL HISTORY:  Pain in left knee    TECHNIQUE:  AP standing of both knees, PA flexion standing views of both knees, and Merchant views of both knees were performed.  Lateral views of both knees were also performed.    COMPARISON:  Non 05/20/2024 e    FINDINGS:  Postoperative changes of ACL repair involving the left knee similar to the previous study.  Slight soft tissue swelling noted anterior to the left knee joint.  No significant joint space narrowing.  No fracture or dislocation.  No bone destruction identified.  Impression: See above    Electronically signed by: Jens Davis MD  Date:    08/19/2024  Time:    10:15         These findings were discussed and reviewed with the patient.         Assessment:       Encounter Diagnoses   Name Primary?    Left knee pain, unspecified chronicity Yes    Rupture of anterior cruciate ligament of left knee, sequela     Effusion, left knee               Plan:       1. RTC in 6 weeks with Dr. Paolo Gibbons. IKDC, SF-12 and KOOS was not filled out today in clinic. Patient will fill out IKDC, SF-12 and KOOS on return.    2. Medications:  Refills of the following Rx were sent to patients preferred Pharmacy:  No Refills Needed Today    3. Physical Therapy: Continue/Begin: Continue following protocol     4. HEP: N/A    5. Procedures/Procedural Planning:   We reviewed with Ramon today, the pathology and natural history of his diagnosis. We had an extensive discussion as to the conservative treatment and management of their condition. We also discussed the variety of treatment options to include medication, physical therapy, diagnostic testing as well as other treatments.The decision was made to go forward with:     knee - left    Aspiration only, 16cc performed today, see procedure note.      6. DME: [facility] skin given today    7. Work/Sport Status: physician     8. Visit Summary: Patient had small effusion in left knee. Aspiration of 16cc. Continue with compression, icing, PT, switch from celebrex to Meloxicam 15mg to help with swelling                           [facility] patient questionnaires have been collected today.

## 2024-08-19 ENCOUNTER — OFFICE VISIT (OUTPATIENT)
Dept: SPORTS MEDICINE | Facility: CLINIC | Age: 49
End: 2024-08-19
Payer: OTHER GOVERNMENT

## 2024-08-19 ENCOUNTER — HOSPITAL ENCOUNTER (OUTPATIENT)
Dept: RADIOLOGY | Facility: HOSPITAL | Age: 49
Discharge: HOME | End: 2024-08-19
Attending: ORTHOPAEDIC SURGERY
Payer: OTHER GOVERNMENT

## 2024-08-19 VITALS
WEIGHT: 189.81 LBS | BODY MASS INDEX: 26.57 KG/M2 | SYSTOLIC BLOOD PRESSURE: 126 MMHG | DIASTOLIC BLOOD PRESSURE: 89 MMHG | HEIGHT: 71 IN

## 2024-08-19 DIAGNOSIS — S83.512S RUPTURE OF ANTERIOR CRUCIATE LIGAMENT OF LEFT KNEE, SEQUELA: ICD-10-CM

## 2024-08-19 DIAGNOSIS — M25.562 LEFT KNEE PAIN, UNSPECIFIED CHRONICITY: ICD-10-CM

## 2024-08-19 DIAGNOSIS — M25.562 LEFT KNEE PAIN, UNSPECIFIED CHRONICITY: Primary | ICD-10-CM

## 2024-08-19 DIAGNOSIS — M25.462 EFFUSION, LEFT KNEE: ICD-10-CM

## 2024-08-19 PROCEDURE — 20611 DRAIN/INJ JOINT/BURSA W/US: CPT | Mod: PBBFAC,LT | Performed by: ORTHOPAEDIC SURGERY

## 2024-08-19 PROCEDURE — 73564 X-RAY EXAM KNEE 4 OR MORE: CPT | Mod: TC,50

## 2024-08-19 PROCEDURE — 99213 OFFICE O/P EST LOW 20 MIN: CPT | Mod: PBBFAC,25 | Performed by: ORTHOPAEDIC SURGERY

## 2024-08-19 PROCEDURE — 99999 PR PBB SHADOW E&M-EST. PATIENT-LVL III: CPT | Mod: PBBFAC,,, | Performed by: ORTHOPAEDIC SURGERY

## 2024-08-19 PROCEDURE — 73564 X-RAY EXAM KNEE 4 OR MORE: CPT | Mod: 26,50,, | Performed by: RADIOLOGY

## 2024-08-19 PROCEDURE — 99214 OFFICE O/P EST MOD 30 MIN: CPT | Mod: S$PBB,25,, | Performed by: ORTHOPAEDIC SURGERY

## 2024-08-19 RX ORDER — MELOXICAM 15 MG/1
15 TABLET ORAL DAILY
Qty: 60 TABLET | Refills: 3 | Status: SHIPPED | OUTPATIENT
Start: 2024-08-19

## 2024-08-19 NOTE — PROCEDURES
Large Joint Aspiration/Injection: L knee    Date/Time: 8/19/2024 10:30 AM    Performed by: Paolo Gibbons MD  Authorized by: Paolo Gibbons MD    Indications:  Joint swelling    Details:  Needle Size:  22 G  Ultrasonic Guidance for needle placement?: Yes    Images are saved and documented.  Approach:  Anterolateral  Location:  Knee  Site:  L knee  Aspirate amount (mL):  16  Aspirate:  Serous

## 2024-08-26 ENCOUNTER — PATIENT OUTREACH (OUTPATIENT)
Dept: ADMINISTRATIVE | Facility: HOSPITAL | Age: 49
End: 2024-08-26
Payer: OTHER GOVERNMENT

## 2024-08-27 ENCOUNTER — TELEPHONE (OUTPATIENT)
Dept: ADMINISTRATIVE | Facility: HOSPITAL | Age: 49
End: 2024-08-27
Payer: OTHER GOVERNMENT

## 2024-08-27 ENCOUNTER — PATIENT MESSAGE (OUTPATIENT)
Dept: FAMILY MEDICINE | Facility: CLINIC | Age: 49
End: 2024-08-27
Payer: OTHER GOVERNMENT

## 2024-08-27 ENCOUNTER — PATIENT OUTREACH (OUTPATIENT)
Dept: ADMINISTRATIVE | Facility: HOSPITAL | Age: 49
End: 2024-08-27
Payer: OTHER GOVERNMENT

## 2024-08-27 DIAGNOSIS — I45.10 INCOMPLETE RIGHT BUNDLE BRANCH BLOCK: ICD-10-CM

## 2024-08-27 DIAGNOSIS — E55.9 VITAMIN D DEFICIENCY DISEASE: ICD-10-CM

## 2024-08-27 DIAGNOSIS — Z12.5 PROSTATE CANCER SCREENING ENCOUNTER, OPTIONS AND RISKS DISCUSSED: Primary | ICD-10-CM

## 2024-08-27 DIAGNOSIS — E78.6 HIGH-DENSITY LIPOPROTEIN DEFICIENCY: ICD-10-CM

## 2024-08-27 DIAGNOSIS — Z87.438 HISTORY OF PROSTATITIS: ICD-10-CM

## 2024-08-27 DIAGNOSIS — Z00.00 ROUTINE GENERAL MEDICAL EXAMINATION AT A HEALTH CARE FACILITY: ICD-10-CM

## 2024-08-27 NOTE — TELEPHONE ENCOUNTER
Dr. Hutchinson called back scheduled pcp visit wants Dr. PEREZ to place orders for labs before his appt in December. Stated that he will have the labs done when he has time between pt's

## 2024-09-27 NOTE — PROGRESS NOTES
Subjective:          Chief Complaint: Ramon Carlos is a 48 y.o. male who had concerns including Pain of the Left Knee.    Patient comes in to clinic today for 8 month postop of the left knee. He was last seen 6 weeks ago and had aspiration. He believes his swelling has stayed down since last visit. He is attending PT at [facility]. He does note he has loss of sensation from medial knee to lateral knee and down lateral aspect of tibia into 5th toe.     DATE OF PROCEDURE: 2/22/2024     ATTENDING SURGEON: Surgeon(s) and Role:     * Paolo Gibbons MD - Primary     Assistants:  MD Syeda Liu PA-C     PREOPERATIVE DIAGNOSIS:  Left  Chondromalacia, (excludes patella) M94.29, Internal derangement knee M23.90, Tear, Lateral meniscus, acute S83.289A, Tear, Medial meniscus, acute S83.249A, and Anterior Cruciate Ligament Tear S83.510     POSTOPERATIVE DIAGNOSIS:   Left  Chondromalacia, (excludes patella) M94.29, Internal derangement knee M23.90, Tear, Lateral meniscus, acute S83.289A, Tear, Medial meniscus, acute S83.249A, and Anterior Cruciate Ligament Tear S83.510     PROCEDURES(S) PERFORMED: Complex  1. Left  Arthroscopy, anterior cruciate ligament reconstruction 91006  2.  Left  Ligament knee reconstruction, extra-articular 49843, MCL  3.  Left  Arthroscopy, with meniscus repair (medial AND lateral) 77459  4.  Left  Arthroscopy, debridement/shaving of articular cartilage (chondroplasty) 18257  5.  Left  Arthroscopy, with lysis of adhesions 13841     Complexity of the case increases based on the length of time between the initial operative intervention and date of injury with change in anatomy secondary to timing of surgery.     ANESTHESIA: General / LMA, Adductor block with catheter, Local 50cc JASON     FLUIDS IN THE CASE: 1500 ml     ESTIMATED BLOOD LOSS: Minimal     URINE OUTPUT: 0 ml     COMPLICATIONS: none     CONDITION ON RETURN TO RECOVERY ROOM: Good       Expand All Collapse  All           IMPLANTS UTILIZED: [employer] equipment, Mitek Truespan x 8, Mitek Rigidloop Adjustable x 1, Mitek Yulissa screw 9 x 23 mm, 11 x 30 mm, Mitek Triple loaded Healix anchor x 1, Mitek Healix advance SP PEEK [place] x 2     GRAFT SOURCE:  BPTB , Autologous  Size 11.0 x 100 mm graft  Proximal bone plug 11.0 x 23 mm  Distal bone plug 11.0 x 24 mm     MTF medium cortical fibers     FINDINGS:      ARTICULAR CARTILAGE LESION(S):  Medial Femoral Condyle: ICRS Grade 0                 Size: none  Medial tibial plateau: ICRS Grade 0                 Size: none           Lateral Femoral Condyle: ICRS Grade 2                 Size: 2.0 x 2.0 cm  Lateral tibial plateau: ICRS Grade 0                 Size: none           Patellar surface: ICRS Grade 0                 Size: none  Trochlear groove: ICRS Grade 0                 Size: none              Review of Systems   Constitutional: Negative for fever and night sweats.   HENT:  Negative for hearing loss.    Eyes:  Negative for blurred vision and visual disturbance.   Cardiovascular:  Negative for chest pain and leg swelling.   Respiratory:  Negative for shortness of breath.    Endocrine: Negative for polyuria.   Hematologic/Lymphatic: Negative for bleeding problem.   Skin:  Negative for rash.   Musculoskeletal:  Negative for back pain, joint pain, joint swelling, muscle cramps and muscle weakness.   Gastrointestinal:  Negative for melena.   Genitourinary:  Negative for hematuria.   Neurological:  Negative for loss of balance, numbness and paresthesias.   Psychiatric/Behavioral:  Negative for altered mental status.        Pain Related Questions  Over the past 3 days, what was your average pain during activity? (I.e. running, jogging, walking, climbing stairs, getting dressed, ect.): 2  Over the past 3 days, what was your highest pain level?: 2  Over the past 3 days, what was your lowest pain level? : 2    Other  How many nights a week are you awakened by your affected body  part?: 0  Was the patient's HEIGHT measured or patient reported?: Measured  Was the patient's WEIGHT measured or patient reported?: Measured      Objective:        General: Ramon is well-developed, well-nourished, appears stated age, in no acute distress, alert and oriented to time, place and person.     General    Vitals reviewed.  Constitutional: He is oriented to person, place, and time. He appears well-developed and well-nourished. No distress.   HENT: Mouth/Throat: No oropharyngeal exudate.   Eyes: Right eye exhibits no discharge. Left eye exhibits no discharge.   Pulmonary/Chest: Effort normal and breath sounds normal. No respiratory distress.   Neurological: He is alert and oriented to person, place, and time. He has normal reflexes. No cranial nerve deficit. Coordination normal.   Psychiatric: He has a normal mood and affect. His behavior is normal. Judgment and thought content normal.     General Musculoskeletal Exam   Gait: abnormal       Right Knee Exam   Right knee exam is normal.    Inspection   Erythema: absent  Scars: absent  Swelling: absent  Effusion: absent  Deformity: absent  Bruising: absent    Tenderness   The patient is experiencing no tenderness.     Range of Motion   Extension:  0   Flexion:  140     Tests   Meniscus   Kim:  Medial - negative Lateral - negative  Ligament Examination   Lachman: normal (-1 to 2mm)   PCL-Posterior Drawer: normal (0 to 2mm)     MCL - Valgus: normal (0 to 2mm)  LCL - Varus: normal  Pivot Shift: normal (Equal)  Reverse Pivot Shift: normal (Equal)  Dial Test at 30 degrees: normal (< 5 degrees)  Dial Test at 90 degrees: normal (< 5 degrees)  Posterior Sag Test: negative  Posterolateral Corner: stable  Patella   Patellar apprehension: negative  Passive Patellar Tilt: neutral  Patellar Tracking: normal  Patellar Glide (quadrants): Lateral - 1   Medial - 2  Q-Angle at 90 degrees: normal  Patellar Grind: negative  J-Sign: none    Other   Meniscal Cyst:  absent  Popliteal (Baker's) Cyst: absent  Sensation: normal    Comments:          Left Knee Exam     Inspection   Erythema: absent  Scars: present  Swelling: absent  Effusion: present  Deformity: absent  Bruising: absent    Tenderness   The patient tender to palpation of the patellar tendon and MCL.    Range of Motion   Extension:  0   Flexion:  140 abnormal     Tests   Meniscus   Kim:  Medial - negative Lateral - negative  Stability   Lachman: normal (-1 to 2mm)   PCL-Posterior Drawer: normal (0 to 2mm)  MCL - Valgus: normal (0 to 2mm)  LCL - Varus: normal (0 to 2mm)  Pivot Shift: normal (Equal)  Reverse Pivot Shift: normal (Equal)  Dial Test at 30 degrees: normal (< 5 degrees)  Dial Test at 90 degrees: normal (< 5 degrees)  Posterior Sag Test: negative  Posterolateral Corner: stable  Patella   Patellar apprehension: negative  Passive Patellar Tilt: neutral  Patellar Tracking: normal  Patellar Glide (Quadrants): Lateral - 1 Medial - 2  Q-Angle at 90 degrees: normal  Patellar Grind: negative  J-Sign: J sign absent    Other   Meniscal Cyst: absent  Popliteal (Baker's) Cyst: absent  Sensation: normal    Right Hip Exam     Tests   Dexter: negative  Left Hip Exam     Tests   Dexter: negative          Reflexes     Left Side  Achilles:  2+  Quadriceps:  2+    Right Side   Achilles:  2+  Quadriceps:  2+    Vascular Exam     Right Pulses  Dorsalis Pedis:      2+  Posterior Tibial:      2+        Left Pulses  Dorsalis Pedis:      2+  Posterior Tibial:      2+          Radiographic Findings:    X-ray Knee Ortho Bilateral with Flexion  Narrative: EXAMINATION:  XR KNEE ORTHO BILAT WITH FLEXION    CLINICAL HISTORY:  Pain in left knee    TECHNIQUE:  AP standing of both knees, PA flexion standing views of both knees, and Merchant views of both knees were performed.  Lateral views of both knees were also performed.    COMPARISON:  Non 05/20/2024 e    FINDINGS:  Postoperative changes of ACL repair involving the left knee similar to  the previous study.  Slight soft tissue swelling noted anterior to the left knee joint.  No significant joint space narrowing.  No fracture or dislocation.  No bone destruction identified.  Impression: See above    Electronically signed by: Jens Davis MD  Date:    2024  Time:    10:15         These findings were discussed and reviewed with the patient.         Assessment:       Encounter Diagnoses   Name Primary?    Pain in both knees, unspecified chronicity Yes    Acute medial meniscus tear of left knee, initial encounter     Sprain of medial collateral ligament of left knee, initial encounter     Rupture of anterior cruciate ligament of left knee, subsequent encounter                 Plan:       1. RTC in 6 months with Dr. Paolo Gibbons. IKDC, SF-12 and KOOS was not filled out today in clinic. Patient will fill out IKDC, SF-12 and KOOS on return.    2. Medications: Refills of the following Rx were sent to patients preferred Pharmacy:  Meloxicam 15mg Tab    3. Physical Therapy: Continue/Begin: Continue at [facility] following protocol and progressing as tolerated     4. HEP: N/A    5. Procedures/Procedural Plannin. DME: [facility] continue wearing as needed    7. Work/Sport Status: physician     8. Visit Summary: Patient doing better since aspiration 6 weeks ago. Continue with PT and meloxicam. We encourage him to continue protocol and working on SL exercises, core, glutes, etc. He is overall doing well and we are happy with his progress                           [facility] patient questionnaires have been collected today.

## 2024-09-30 ENCOUNTER — OFFICE VISIT (OUTPATIENT)
Dept: SPORTS MEDICINE | Facility: CLINIC | Age: 49
End: 2024-09-30
Payer: OTHER GOVERNMENT

## 2024-09-30 ENCOUNTER — HOSPITAL ENCOUNTER (OUTPATIENT)
Dept: RADIOLOGY | Facility: HOSPITAL | Age: 49
Discharge: HOME | End: 2024-09-30
Attending: ORTHOPAEDIC SURGERY
Payer: OTHER GOVERNMENT

## 2024-09-30 VITALS
HEIGHT: 71 IN | BODY MASS INDEX: 26.57 KG/M2 | DIASTOLIC BLOOD PRESSURE: 94 MMHG | SYSTOLIC BLOOD PRESSURE: 145 MMHG | HEART RATE: 51 BPM | WEIGHT: 189.81 LBS

## 2024-09-30 DIAGNOSIS — M25.561 PAIN IN BOTH KNEES, UNSPECIFIED CHRONICITY: Primary | ICD-10-CM

## 2024-09-30 DIAGNOSIS — M25.561 PAIN IN BOTH KNEES, UNSPECIFIED CHRONICITY: ICD-10-CM

## 2024-09-30 DIAGNOSIS — M25.562 PAIN IN BOTH KNEES, UNSPECIFIED CHRONICITY: ICD-10-CM

## 2024-09-30 DIAGNOSIS — S83.512D RUPTURE OF ANTERIOR CRUCIATE LIGAMENT OF LEFT KNEE, SUBSEQUENT ENCOUNTER: ICD-10-CM

## 2024-09-30 DIAGNOSIS — S83.242A ACUTE MEDIAL MENISCUS TEAR OF LEFT KNEE, INITIAL ENCOUNTER: ICD-10-CM

## 2024-09-30 DIAGNOSIS — M25.562 PAIN IN BOTH KNEES, UNSPECIFIED CHRONICITY: Primary | ICD-10-CM

## 2024-09-30 DIAGNOSIS — M25.462 EFFUSION, LEFT KNEE: ICD-10-CM

## 2024-09-30 DIAGNOSIS — S83.412A SPRAIN OF MEDIAL COLLATERAL LIGAMENT OF LEFT KNEE, INITIAL ENCOUNTER: ICD-10-CM

## 2024-09-30 DIAGNOSIS — S83.512S RUPTURE OF ANTERIOR CRUCIATE LIGAMENT OF LEFT KNEE, SEQUELA: ICD-10-CM

## 2024-09-30 PROCEDURE — 73564 X-RAY EXAM KNEE 4 OR MORE: CPT | Mod: TC,50

## 2024-09-30 PROCEDURE — 99214 OFFICE O/P EST MOD 30 MIN: CPT | Mod: PBBFAC,25 | Performed by: ORTHOPAEDIC SURGERY

## 2024-09-30 PROCEDURE — 99999 PR PBB SHADOW E&M-EST. PATIENT-LVL IV: CPT | Mod: PBBFAC,,, | Performed by: ORTHOPAEDIC SURGERY

## 2024-09-30 PROCEDURE — 73564 X-RAY EXAM KNEE 4 OR MORE: CPT | Mod: 26,50,, | Performed by: RADIOLOGY

## 2024-09-30 PROCEDURE — 99214 OFFICE O/P EST MOD 30 MIN: CPT | Mod: S$PBB,,, | Performed by: ORTHOPAEDIC SURGERY

## 2024-09-30 RX ORDER — MELOXICAM 15 MG/1
15 TABLET ORAL DAILY
Qty: 60 TABLET | Refills: 3 | Status: SHIPPED | OUTPATIENT
Start: 2024-09-30

## 2024-11-05 ENCOUNTER — CLINICAL SUPPORT (OUTPATIENT)
Dept: OTOLARYNGOLOGY | Facility: CLINIC | Age: 49
End: 2024-11-05
Payer: OTHER GOVERNMENT

## 2024-11-05 ENCOUNTER — CLINICAL SUPPORT (OUTPATIENT)
Dept: FAMILY MEDICINE | Facility: CLINIC | Age: 49
End: 2024-11-05
Payer: OTHER GOVERNMENT

## 2024-11-05 ENCOUNTER — LAB VISIT (OUTPATIENT)
Dept: LAB | Facility: HOSPITAL | Age: 49
End: 2024-11-05
Attending: INTERNAL MEDICINE
Payer: OTHER GOVERNMENT

## 2024-11-05 DIAGNOSIS — I45.10 INCOMPLETE RIGHT BUNDLE BRANCH BLOCK: ICD-10-CM

## 2024-11-05 DIAGNOSIS — Z00.00 ROUTINE MEDICAL EXAM: ICD-10-CM

## 2024-11-05 DIAGNOSIS — H90.3 SENSORINEURAL HEARING LOSS, BILATERAL: Primary | ICD-10-CM

## 2024-11-05 DIAGNOSIS — E78.6 HIGH-DENSITY LIPOPROTEIN DEFICIENCY: ICD-10-CM

## 2024-11-05 DIAGNOSIS — Z12.5 PROSTATE CANCER SCREENING ENCOUNTER, OPTIONS AND RISKS DISCUSSED: ICD-10-CM

## 2024-11-05 DIAGNOSIS — E55.9 VITAMIN D DEFICIENCY DISEASE: ICD-10-CM

## 2024-11-05 DIAGNOSIS — Z00.00 ROUTINE GENERAL MEDICAL EXAMINATION AT A HEALTH CARE FACILITY: ICD-10-CM

## 2024-11-05 DIAGNOSIS — Z87.438 HISTORY OF PROSTATITIS: ICD-10-CM

## 2024-11-05 DIAGNOSIS — Z23 IMMUNIZATION DUE: Primary | ICD-10-CM

## 2024-11-05 LAB
25(OH)D3+25(OH)D2 SERPL-MCNC: 45 NG/ML (ref 30–96)
ALBUMIN SERPL BCP-MCNC: 4 G/DL (ref 3.5–5.2)
ALP SERPL-CCNC: 67 U/L (ref 40–150)
ALT SERPL W/O P-5'-P-CCNC: 26 U/L (ref 10–44)
ANION GAP SERPL CALC-SCNC: 8 MMOL/L (ref 8–16)
AST SERPL-CCNC: 20 U/L (ref 10–40)
BASOPHILS # BLD AUTO: 0.05 K/UL (ref 0–0.2)
BASOPHILS NFR BLD: 0.9 % (ref 0–1.9)
BILIRUB SERPL-MCNC: 1 MG/DL (ref 0.1–1)
BUN SERPL-MCNC: 18 MG/DL (ref 6–20)
CALCIUM SERPL-MCNC: 9.4 MG/DL (ref 8.7–10.5)
CHLORIDE SERPL-SCNC: 108 MMOL/L (ref 95–110)
CHOLEST SERPL-MCNC: 195 MG/DL (ref 120–199)
CHOLEST/HDLC SERPL: 5.3 {RATIO} (ref 2–5)
CO2 SERPL-SCNC: 25 MMOL/L (ref 23–29)
COMPLEXED PSA SERPL-MCNC: 1.4 NG/ML (ref 0–4)
CREAT SERPL-MCNC: 0.9 MG/DL (ref 0.5–1.4)
DIFFERENTIAL METHOD BLD: ABNORMAL
EOSINOPHIL # BLD AUTO: 0.1 K/UL (ref 0–0.5)
EOSINOPHIL NFR BLD: 0.9 % (ref 0–8)
ERYTHROCYTE [DISTWIDTH] IN BLOOD BY AUTOMATED COUNT: 11.9 % (ref 11.5–14.5)
EST. GFR  (NO RACE VARIABLE): >60 ML/MIN/1.73 M^2
ESTIMATED AVG GLUCOSE: 97 MG/DL (ref 68–131)
GLUCOSE SERPL-MCNC: 80 MG/DL (ref 70–110)
HBA1C MFR BLD: 5 % (ref 4–5.6)
HCT VFR BLD AUTO: 47.7 % (ref 40–54)
HDLC SERPL-MCNC: 37 MG/DL (ref 40–75)
HDLC SERPL: 19 % (ref 20–50)
HGB BLD-MCNC: 15.9 G/DL (ref 14–18)
IMM GRANULOCYTES # BLD AUTO: 0.04 K/UL (ref 0–0.04)
IMM GRANULOCYTES NFR BLD AUTO: 0.7 % (ref 0–0.5)
LDLC SERPL CALC-MCNC: 131.4 MG/DL (ref 63–159)
LYMPHOCYTES # BLD AUTO: 2.2 K/UL (ref 1–4.8)
LYMPHOCYTES NFR BLD: 38.1 % (ref 18–48)
MCH RBC QN AUTO: 29 PG (ref 27–31)
MCHC RBC AUTO-ENTMCNC: 33.3 G/DL (ref 32–36)
MCV RBC AUTO: 87 FL (ref 82–98)
MONOCYTES # BLD AUTO: 0.5 K/UL (ref 0.3–1)
MONOCYTES NFR BLD: 8.5 % (ref 4–15)
NEUTROPHILS # BLD AUTO: 2.9 K/UL (ref 1.8–7.7)
NEUTROPHILS NFR BLD: 50.9 % (ref 38–73)
NONHDLC SERPL-MCNC: 158 MG/DL
NRBC BLD-RTO: 0 /100 WBC
PLATELET # BLD AUTO: 234 K/UL (ref 150–450)
PMV BLD AUTO: 10.6 FL (ref 9.2–12.9)
POTASSIUM SERPL-SCNC: 4.3 MMOL/L (ref 3.5–5.1)
PROT SERPL-MCNC: 7.1 G/DL (ref 6–8.4)
RBC # BLD AUTO: 5.49 M/UL (ref 4.6–6.2)
SODIUM SERPL-SCNC: 141 MMOL/L (ref 136–145)
T4 FREE SERPL-MCNC: 1.26 NG/DL (ref 0.71–1.51)
TRIGL SERPL-MCNC: 133 MG/DL (ref 30–150)
TSH SERPL DL<=0.005 MIU/L-ACNC: 1.63 UIU/ML (ref 0.4–4)
WBC # BLD AUTO: 5.67 K/UL (ref 3.9–12.7)

## 2024-11-05 PROCEDURE — 84439 ASSAY OF FREE THYROXINE: CPT | Performed by: NURSE PRACTITIONER

## 2024-11-05 PROCEDURE — 80061 LIPID PANEL: CPT | Performed by: INTERNAL MEDICINE

## 2024-11-05 PROCEDURE — 99999PBSHW PR PBB SHADOW TECHNICAL ONLY FILED TO HB: Mod: PBBFAC,,,

## 2024-11-05 PROCEDURE — 99999 PR PBB SHADOW E&M-EST. PATIENT-LVL II: CPT | Mod: PBBFAC,,,

## 2024-11-05 PROCEDURE — 85025 COMPLETE CBC W/AUTO DIFF WBC: CPT | Performed by: NURSE PRACTITIONER

## 2024-11-05 PROCEDURE — 99999 PR PBB SHADOW E&M-EST. PATIENT-LVL I: CPT | Mod: PBBFAC,,, | Performed by: AUDIOLOGIST

## 2024-11-05 PROCEDURE — 99499 UNLISTED E&M SERVICE: CPT | Mod: S$PBB,,, | Performed by: AUDIOLOGIST

## 2024-11-05 PROCEDURE — G0008 ADMIN INFLUENZA VIRUS VAC: HCPCS | Mod: PBBFAC,PO

## 2024-11-05 PROCEDURE — 99211 OFF/OP EST MAY X REQ PHY/QHP: CPT | Mod: PBBFAC,27,PN | Performed by: AUDIOLOGIST

## 2024-11-05 PROCEDURE — 36415 COLL VENOUS BLD VENIPUNCTURE: CPT | Mod: PO | Performed by: NURSE PRACTITIONER

## 2024-11-05 PROCEDURE — 82306 VITAMIN D 25 HYDROXY: CPT | Performed by: NURSE PRACTITIONER

## 2024-11-05 PROCEDURE — 84443 ASSAY THYROID STIM HORMONE: CPT | Performed by: NURSE PRACTITIONER

## 2024-11-05 PROCEDURE — 99212 OFFICE O/P EST SF 10 MIN: CPT | Mod: PBBFAC,PO

## 2024-11-05 PROCEDURE — 84153 ASSAY OF PSA TOTAL: CPT | Performed by: NURSE PRACTITIONER

## 2024-11-05 PROCEDURE — 90656 IIV3 VACC NO PRSV 0.5 ML IM: CPT | Mod: PBBFAC,PO

## 2024-11-05 PROCEDURE — 80053 COMPREHEN METABOLIC PANEL: CPT | Performed by: INTERNAL MEDICINE

## 2024-11-05 PROCEDURE — 83036 HEMOGLOBIN GLYCOSYLATED A1C: CPT | Performed by: NURSE PRACTITIONER

## 2024-11-05 RX ADMIN — INFLUENZA VIRUS VACCINE 0.5 ML: 15; 15; 15 SUSPENSION INTRAMUSCULAR at 08:11

## 2024-11-05 NOTE — PROGRESS NOTES
Ramon Carlos was seen today in the clinic for a hearing aid follow up.  He reported that his wax guard is turned in his left  and he was unable to remove it.  I changed the receivers to both hearing aids and replaced the domes and cleaned the microphones.  The listening check revealed good sound quality for both hearing aids.  He will contact me with any hearing aid issues.    Right hearing aid:              Phonak Audeo M90-R              SN [id number]              #1, med               P8 color              Large open dome              Warranty until 8/16/23     Left hearing aid:              Phonak Audeo M90-R              SN [id number]              #1, med               P8 color              Large open dome              Warranty until 8/16/23

## 2024-12-17 ENCOUNTER — OFFICE VISIT (OUTPATIENT)
Dept: FAMILY MEDICINE | Facility: CLINIC | Age: 49
End: 2024-12-17
Payer: OTHER GOVERNMENT

## 2024-12-17 ENCOUNTER — TELEPHONE (OUTPATIENT)
Dept: FAMILY MEDICINE | Facility: CLINIC | Age: 49
End: 2024-12-17
Payer: OTHER GOVERNMENT

## 2024-12-17 VITALS
HEIGHT: 71 IN | WEIGHT: 193.56 LBS | OXYGEN SATURATION: 96 % | BODY MASS INDEX: 27.1 KG/M2 | TEMPERATURE: 98 F | DIASTOLIC BLOOD PRESSURE: 94 MMHG | HEART RATE: 60 BPM | SYSTOLIC BLOOD PRESSURE: 132 MMHG

## 2024-12-17 DIAGNOSIS — Z80.0 FAMILY HISTORY OF COLON CANCER: ICD-10-CM

## 2024-12-17 DIAGNOSIS — E55.9 VITAMIN D DEFICIENCY DISEASE: ICD-10-CM

## 2024-12-17 DIAGNOSIS — J30.2 SEASONAL ALLERGIC RHINITIS, UNSPECIFIED TRIGGER: ICD-10-CM

## 2024-12-17 DIAGNOSIS — Z12.5 SCREENING FOR PROSTATE CANCER: ICD-10-CM

## 2024-12-17 DIAGNOSIS — Z87.438 HISTORY OF PROSTATITIS: ICD-10-CM

## 2024-12-17 DIAGNOSIS — E78.6 HIGH-DENSITY LIPOPROTEIN DEFICIENCY: ICD-10-CM

## 2024-12-17 DIAGNOSIS — Z00.00 ROUTINE MEDICAL EXAM: Primary | ICD-10-CM

## 2024-12-17 DIAGNOSIS — Z86.0100 HISTORY OF COLONIC POLYPS: ICD-10-CM

## 2024-12-17 DIAGNOSIS — E78.5 HYPERLIPIDEMIA, UNSPECIFIED HYPERLIPIDEMIA TYPE: ICD-10-CM

## 2024-12-17 PROCEDURE — 99213 OFFICE O/P EST LOW 20 MIN: CPT | Mod: PBBFAC,PO | Performed by: INTERNAL MEDICINE

## 2024-12-17 PROCEDURE — 99396 PREV VISIT EST AGE 40-64: CPT | Mod: S$PBB,,, | Performed by: INTERNAL MEDICINE

## 2024-12-17 PROCEDURE — 99999 PR PBB SHADOW E&M-EST. PATIENT-LVL III: CPT | Mod: PBBFAC,,, | Performed by: INTERNAL MEDICINE

## 2024-12-17 RX ORDER — EZETIMIBE 10 MG/1
10 TABLET ORAL DAILY
Qty: 90 TABLET | Refills: 12 | Status: SHIPPED | OUTPATIENT
Start: 2024-12-17

## 2024-12-17 NOTE — PROGRESS NOTES
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  .  He has no cardiopulmonary symptoms.  He does have a strong family history of colon polyps and 7/23 colonoscopy w one 10mm polyp -3 yrs.   No first degree relatives with colon cancer better paternal grandfather had colon cancer at age 53 and an uncle with colon cancer.  His father and 2 uncles had colon polyps.     Patient does get some postnasal drip when he gets in the shower causing him to gag.  Otherwise some allergy symptoms when around cats now.   Continuing on his Allegra,Flonase and some Astelin and nasal saline especially trying nasal saline prior to shower.      He did have COVID and now since then has he constant smell of smoke which is worse when he lays down.  We discussed probably good to see ENT to rule out any occult sinus drainage that could be causing that unusual smell.  No symptoms of sinusitis.    Reviewed the history that in May of 2019 he likely had significant prostatitis.  Thinks it is possibly related to constipation at that time.  No hemorrhoid problems lately.  No prostatitis or other urinary symptoms lately.  He did have some acute right flank pain and was diagnosed with a kidney stone which eventually did past.  He had no residual stones we reviewed the two CT scans.  He did visit the issue with Urology.    Knee problems are better having discontinued doing martial arts.  He does use turmeric.  He continues on vitamin-D and we will reassess the vitamin-D deficiency.  He also takes 1000 a vitamin-C and drinks orange juice and maybe should run the tolerable amount of vitamin-C by the urologist in regards to kidney stones.  Had ACL surgery 2/2024.  Currently continue with physical therapy.  Not yet able to run.  He has been on long term Mobic.  Blood pressures occasionally run slightly  high diastolics at home and we discussed continuing to monitor especially when anti-inflammatories discontinued.  Lack of exercise maybe showing some blood pressure elevation issues.    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  He is exercising at the gym five days per week and doing less running but plans to restart running.  LDL increase.  Wife was on a low carb diet and may have increased cholesterol intake.  He will make adjustments.   to 143 last year.  HDL down to 37- less activity after knee surgery?      He has had an HDL deficiency in the past that may or may not be influenced by his exercise and we will reassess obviously not exercising as much at this time.  We discussed his LDL elevation could well be genetic and we discussed the benefits of Crestor.  Apparently his father had a lot of myalgias on statin.  He is on Zetia and tolerating it well.  We discussed risk stratification with cardiac calcium score and he was given a printed copy to go to diagnostic imaging.      ROS:   CONST: weight is up but stable.. EYES: no vision change. ENT: no sore throat. CV: no chest pain w/ exertion. RESP: no shortness of breath. GI: no nausea, vomiting, diarrhea. No dysphagia. : no urinary issues. MUSCULOSKELETAL: no other new myalgias or arthralgias. SKIN: no new changes. NEURO: no focal deficits. PSYCH: no new issues. ENDOCRINE: no polyuria. HEME: no lymph nodes. ALLERGY: no general pruritis.    Past medical history:  1.  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  Hearing loss secondary to prior ear infections, now with hearing aids  3.  Anaphylaxis to penicillin when treated for meningitis  4.  Possible SVT or other tachyarrhythmia that responds to vagal maneuvers  5.  Incomplete right bundle-branch block on EKG  6.  Vitamin D deficiency  7.  Low HDL at 38  8.  Achilles tendinitis  9.  Chronic ALLERGIES and history of sinusitis, Dr. Reed  10.  Clinical prostatitis and jose juan PSA 5/19  -seen  Roger  11. Kidney stone 3/2021  12.   colonoscopy w one 10mm polyp -3 yrs    Past surgical history:  1.  Tonsillectomy  2.   ACL surgery 2024.    Family history: Mother  at age 42 of breast cancer.  Father   of COPD exacerbation and had underlying heart disease by an angiogram.  Father had colon polyps and hypertension.  One sister with benign breast disease.  Colon polyps in father and an uncle and a paternal grandfather and a paternal uncle with colon cancer.      PSA 41 to 4 in past    Social history: Rare alcohol at home maybe once or twice a week.  Never smoked.   with children.  Was active in martial arts.  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    Vital signs as above  Gen: no distress  EYES: conjunctiva clear, non-icteric, PERRL  ENT: nose clear, nasal mucosa normal, oropharynx clear and moist, teeth good, ear canals clear and tympanic membranes pearly  NECK:supple, thyroid non-palpable  RESP: effort is good, lungs clear  CV: heart RRR w/o murmur, gallops or rubs; no carotid bruits, no edema  GI: abdomen soft, non-distended, non-tender, no hepatosplenomegaly  MS: gait normal, no clubbing or cyanosis of the digits  SKIN: no rashes, warm to touch      Labs reviewed,  to 143, 153, 131    Diagnoses and all orders for this visit:    Routine medical exam, labs reviewed, up-to-date on PSA, colonoscopy     Screening for prostate cancer    History of colonic polyps    Hyperlipidemia, unspecified hyperlipidemia type, chronic and stable on Zetia, risk stratify with cardiac calcium score  -     CT Calcium Scoring Cardiac; Future  -     CT Calcium Scoring Cardiac    High-density lipoprotein deficiency, lower probably related to lack of exercise due to the knee surgery    History of prostatitis, no symptoms    Seasonal allergic rhinitis, unspecified trigger, chronic and stable, due to the new abnormal smell, might be time to follow up with the ENT    Vitamin D deficiency  disease, improved    Family history of colon cancer, up-to-date on screening    Other orders  -     ezetimibe (ZETIA) 10 mg tablet; Take 1 tablet (10 mg total) by mouth once daily.

## 2025-01-17 ENCOUNTER — OFFICE VISIT (OUTPATIENT)
Dept: OTOLARYNGOLOGY | Facility: CLINIC | Age: 50
End: 2025-01-17
Payer: OTHER GOVERNMENT

## 2025-01-17 VITALS
SYSTOLIC BLOOD PRESSURE: 158 MMHG | WEIGHT: 193.56 LBS | BODY MASS INDEX: 27.1 KG/M2 | DIASTOLIC BLOOD PRESSURE: 100 MMHG | HEIGHT: 71 IN

## 2025-01-17 DIAGNOSIS — R04.2 HEMOPTYSIS: ICD-10-CM

## 2025-01-17 DIAGNOSIS — R09.81 NASAL CONGESTION: ICD-10-CM

## 2025-01-17 DIAGNOSIS — J34.89 NASAL CRUSTING: ICD-10-CM

## 2025-01-17 DIAGNOSIS — R04.0 EPISTAXIS: Primary | ICD-10-CM

## 2025-01-17 DIAGNOSIS — J30.2 SEASONAL ALLERGIC RHINITIS, UNSPECIFIED TRIGGER: ICD-10-CM

## 2025-01-17 DIAGNOSIS — J34.3 HYPERTROPHY OF INFERIOR NASAL TURBINATE: ICD-10-CM

## 2025-01-17 PROCEDURE — 99214 OFFICE O/P EST MOD 30 MIN: CPT | Mod: 25,S$GLB,, | Performed by: OTOLARYNGOLOGY

## 2025-01-17 PROCEDURE — 31575 DIAGNOSTIC LARYNGOSCOPY: CPT | Mod: S$GLB,,, | Performed by: OTOLARYNGOLOGY

## 2025-01-17 RX ORDER — MUPIROCIN 20 MG/G
OINTMENT TOPICAL 2 TIMES DAILY
Qty: 1 EACH | Refills: 0 | Status: SHIPPED | OUTPATIENT
Start: 2025-01-17 | End: 2025-01-31

## 2025-01-17 RX ORDER — FLUTICASONE PROPIONATE 50 MCG
1 SPRAY, SUSPENSION (ML) NASAL 2 TIMES DAILY
Qty: 16 ML | Refills: 11 | Status: SHIPPED | OUTPATIENT
Start: 2025-01-17

## 2025-01-17 RX ORDER — AZELASTINE 1 MG/ML
1 SPRAY, METERED NASAL 2 TIMES DAILY
Qty: 30 ML | Refills: 3 | Status: SHIPPED | OUTPATIENT
Start: 2025-01-17

## 2025-01-17 NOTE — PROGRESS NOTES
OTOLARYNGOLOGY CLINIC NOTE  Date:  01/17/2025     Chief complaint:  Chief Complaint   Patient presents with    Epistaxis       History of Present Illness  Ramon Carlos is a 49 y.o. male  presenting today for a followup.     Has chronic sinus issues no pressure or pain  Has chronic postnasal drip   Mid December bleeds from back of nose  Has been worsening over past week     Had covid jan-feb 2024 lost sense of smell and ever since then cigarette smell    Left side nmore clogged and more blood coming from let suide   When exercises hard erto berathe out of right     No burn pile exposure        Past Medical History  Past Medical History:   Diagnosis Date    Allergic rhinitis, seasonal     Calcaneus fracture     Family history of colon cancer     High-density lipoprotein deficiency     Incomplete right bundle branch block     Meningitis     Rotator cuff injury     Vitamin D deficiency disease         Past Surgical History  Past Surgical History:   Procedure Laterality Date    ADENOIDECTOMY      ARTHROSCOPIC CHONDROPLASTY OF KNEE JOINT Left 2/22/2024    Procedure: ARTHROSCOPY, KNEE, WITH CHONDROPLASTY;  Surgeon: Paolo Gibbons MD;  Location: [facility] OR;  Service: Orthopedics;  Laterality: Left;    ARTHROSCOPY,KNEE,WITH MENISCUS REPAIR Left 2/22/2024    Procedure: ARTHROSCOPY,KNEE,WITH MENISCUS REPAIR;  Surgeon: Paolo Gibbons MD;  Location: [facility] OR;  Service: Orthopedics;  Laterality: Left;    COLONOSCOPY N/A 7/10/2023    Procedure: COLONOSCOPY;  Surgeon: Laurent Terry MD;  Location: [facility] ENDO (4TH FLR);  Service: Endoscopy;  Laterality: N/A;  Ref by Omar Gudino instr via portal - PC\  7/5/23- Precall confirmed- [place]    EYE SURGERY      KNEE ARTHROSCOPY W/ ACL RECONSTRUCTION Left 2/22/2024    Procedure: RECONSTRUCTION, KNEE, ACL, ARTHROSCOPIC;  Surgeon: Paolo Gibbons MD;  Location: [facility] OR;  Service: Orthopedics;  Laterality: Left;  general, regional w/ catheter (adductor),  pericapsular injection, ash 30cc    LYSIS, ADHESIONS, KNEE, ARTHROSCOPIC Left 2/22/2024    Procedure: LYSIS, ADHESIONS, KNEE, ARTHROSCOPIC;  Surgeon: Paolo Gibbons MD;  Location: [facility] OR;  Service: Orthopedics;  Laterality: Left;    RECONSTRUCTION OF LIGAMENT Left 2/22/2024    Procedure: RECONSTRUCTION, LIGAMENT, MCL;  Surgeon: Paolo Gibbons MD;  Location: [facility] OR;  Service: Orthopedics;  Laterality: Left;    TONSILLECTOMY          Medications  Current Outpatient Medications on File Prior to Visit   Medication Sig Dispense Refill    ascorbic acid, vitamin C, (VITAMIN C) 100 MG tablet Take 1000 mg daily      azelastine (ASTELIN) 137 mcg (0.1 %) nasal spray 1 spray (137 mcg total) by Nasal route 2 (two) times daily. 30 mL 12    cholecalciferol, vitamin D3, (VITAMIN D3) 50 mcg (2,000 unit) Cap Take 1 capsule by mouth once daily.      ezetimibe (ZETIA) 10 mg tablet Take 1 tablet (10 mg total) by mouth once daily. 90 tablet 12    fish oil-omega-3 fatty acids 300-1,000 mg capsule Take 2 g by mouth once daily.      fluticasone propionate (FLONASE) 50 mcg/actuation nasal spray Use 1 spray (50 mcg total) by Each Nostril route 2 (two) times daily. 16 g 12    meloxicam (MOBIC) 15 MG tablet Take 1 tablet (15 mg total) by mouth once daily. 60 tablet 3    turmeric 400 mg Cap Take 1 capsule by mouth once daily.       No current facility-administered medications on file prior to visit.       Review of Systems  Review of Systems   Constitutional: Negative.    HENT:  Positive for hearing loss.    Eyes: Negative.    Respiratory: Negative.     Cardiovascular: Negative.    Gastrointestinal: Negative.    Genitourinary: Negative.    Skin: Negative.    Neurological: Negative.    Psychiatric/Behavioral: Negative.      Answers submitted by the patient for this visit:  Review of Symptoms Questionnaire  (Submitted on 1/14/2025)  sinus pressure : Yes  Sinus infection(s)?: Yes  Seasonal Allergies?: Yes      Social History   reports that  he has never smoked. He has never used smokeless tobacco. He reports current alcohol use. He reports that he does not use drugs.     Family History  Family History   Problem Relation Name Age of Onset    Cancer Mother          breast    Hyperlipidemia Father      Hypertension Father      Cancer Paternal Uncle          colon    Hyperlipidemia Maternal Grandmother      Hypertension Maternal Grandmother      Alcohol abuse Maternal Grandfather      Depression Paternal Grandmother      Cancer Paternal Grandfather          colon        Physical Exam   Vitals:    01/17/25 1602   BP: (!) 158/100    Body mass index is 27 kg/m².            GENERAL: no acute distress.  HEAD: normocephalic.   EYES: No scleral icterus  EARS: external ear without lesion, normal pinna shape and position.    NOSE: external nose without significant bony abnormality; bassam negative left slight positive on right  ORAL CAVITY/OROPHARYNX: tongue mobile.   NECK: trachea midline.   LYMPH NODES:No cervical lymphadenopathy.  RESPIRATORY: no stridor, no stertor. Voice normal. Respirations nonlabored.  NEURO: alert, responds to questions appropriately.    PSYCH:mood appropriate    PROCEDURE NOTE  NAME OF PROCEDURE: Flexible Laryngoscopy, diagnostic  INDICATIONS: gag reflex precludes mirror exam, posterior epistaxis vs hemoptysis  FINDINGS: no mass or lesion. Pinpoint blood areas mid-caudal septum     Consent: After procedure was explained in detail and all questions answered, verbal consent was obtained for performing flexible laryngoscopy.  Anesthesia: topical 4% lidocaine and neosynephrine  Procedure: With patient in seated position, the scope was inserted into the bilateral nasal passageway and advanced atraumatically into the nasopharynx to examine the following structures:  Nasal cavity: Turbinates with mild-moderate hypertrophy. minimal middle meatal edema. No purulent drainage. Slight crusting; pinpoint blood mid-caudal septum bilaterally    Nasopharynx: no mass or lesion noted in nasopharynx.   Oropharynx: base of tongue without  mass or ulceration. Lingual tonsils normal in appearance  Hypopharynx: posterior pharyngeal wall without mass or lesion. No pooling of secretions. Pyriform sinuses visible without mass or lesion  Larynx: epiglottis normal without lesion. False vocal folds without edema/erythema/lesion. True vocal folds mobile and without lesion. no interarytenoid edema no erythema . Postcricoid region with no edema no lesion   Subglottis: visualized portion of subglottis normal in appearance    After examination performed, the scope was removed atraumatically . The patient tolerated the procedure well. Photodocumentation obtained with representative images below, all images and/or videos uploaded in media section of epic.                                Imaging:  The patient does not have any new imaging of the head and neck since last visit.     Labs:  CBC  Recent Labs   Lab 12/05/22  1035 12/26/23  0828 11/05/24  0750   WBC 5.48 4.97 5.67   Hemoglobin 15.3 15.4 15.9   Hematocrit 45.7 45.5 47.7   MCV 86 86 87   Platelets 196 190 234     BMP  Recent Labs   Lab 12/05/22  1035 12/26/23  0828 11/05/24  0750   Glucose 88 88 80   Sodium 141 142 141   Potassium 4.2 4.6 4.3   Chloride 104 108 108   CO2 30 H 30 H 25   BUN 16 17 18   Creatinine 1.1 1.0 0.9   Calcium 9.6 9.3 9.4     COAGS        Assessment  1. Epistaxis    2. Seasonal allergic rhinitis, unspecified trigger    3. Hypertrophy of inferior nasal turbinate    4. Nasal crusting    5. Hemoptysis    6. Nasal congestion       Plan:  Discussed plan of care with patient in detail and all questions answered. Patient reported understanding of plan of care. I gave the patient the opportunity to ask questions and patient confirmed all questions answered to satisfaction.   Epistaxis: Best strategy is prevention with moisture and decreasing any potential irritants. Would not recommend cautery prior to  this being attempted.    Mupirocin to anterior nares BID for 2 weeks for nasal crusting, counseled on how to administer and handout with instructions given in AVS .     For prevention of nosebleeds recommend the following ( discussed that this is crucial component of mgmt of nosebleeds because even with cauterization or surgical interventions other vessels can grow around those areas)  -Humidifier in the home.   -vaseline once daily.   -Nasal saline mist spray or ayr gel BID     Counseled on behavioral modifications regarding when nose bleeds/to assist with prevention of bleeding as documented below  -No digital manipulation. Stressed importance of this as well as not putting tissue in nose.  - Discussed about issues by putting tissues in nose and then removing them as this will cause problems with scab healing and cause scab to come off and then nose will bleed again more easily.   -Counseled not to lean head back with bleeding as this leads to potential vomiting  -Can apply moustache dressing under nose to catch blood or secretions.     For active bleeding management :  - Instructed patient to use afrin ( not longer than 2 days in a row) and hold pressure ( instructed where to hold pressure and gave photo on AVS) for 15 minutes without letting go. If still with bleeding , repeat afrin and pressure for 15 minutes. If after 2 rounds of afrin sprays and holding pressure, seek medical assistance. (-Advised to call office if during office hours, if after hours go to ER or if unable to contact office go to ER).  -Counseled on risk of rhinitis medicamentosa with prolonged use of afrin     ?hemoptysis: Do not suspect hemoptysis given lack of source on flex scope but areas on septum are caudal septum so unusual that not having any bleeding from front of nose hence why flex scope done- no mass or lesion noted in larynx/oropharynx/hypopharynx nor any blood streaks     Allergic rhinitis(AR):  discussed about pathophysiology  of allergic disease and sinus disease. We discussed about treatment options for this including but not limited to medications and potential surgeries as well as when surgery is indicated.  - I recommend dual nasal spray therapy as combo of steroid and antihistamine nasal spray has been shown in evidence based studies to be better than either alone.  -Discussed medication administration technique ( point toward outer corner of eye and not towards nasal septum) and use nasal saline prior to medication sprays.   -We discussed importance of saline use prior to medication sprays to help sprays work better and to clean the nose.   -Counseled on risk of bleeding, risk of increased intraocular pressure/cataract with long term use.   -Discussed how to increase and decrease nasal spray regimen based on symptoms and that sprays can be used on prn basis but work best when used daily and take about 2-3 weeks to get to max level . If patient using sprays on a prn basis and symptoms not controlled should go back to daily /bid use  -discussed as well as gave patient physical documentation with photos  about the saline and other medication sprays ( paperwork summarized discussion topics)    Will notify me if not resolving      Please be aware that this note has been generated with the assistance of MModal voice-to-text.  Please excuse any spelling or grammatical errors.

## 2025-01-17 NOTE — PATIENT INSTRUCTIONS
Information and instructions from your visit with me today:    Get a humidifier at the bedside    Can use saline gel in the nose    MUPIROCIN: Use a cotton swab to apply gently inside the nostrils.  Do this 2 times a day for 2 weeks. After this is completed, use vaseline in the nose once daily.use saline spray each time before using the ointment      AFRIN (regular strength): Only use if you have significant bleeding. Use 3 large sprays on the side of bleeding then apply pressure against the front part of the nose on the side of bleeding by pinching the nose for 10-15 minutes without releasing pressure. If still bleeding repeat the afrin and hold pressure. Do not use this spray for more than 3 days in a row. This is very successful at resolving minor nose bleeds. The generic drug name for Afrin is oxymetazoline, and it is sold as a nasal decongestant.  NOTE:  You may not need to do this at all.   Hold nose on soft part and squish together     Lean head forward to prevent swallowing blood as that can make stomach upset    Do NOT do this ( do not put tissue in nose, do not press over bony part of nose)      Always use saline every time before a medication spray. You can also use saline on its own. If you are using saline and/or the medication sprays on an as needed basis and you have symptoms use the regimen daily for at least 2 weeks. You can use the flonase and astelin together, or if you prefer to start with just one medication spray, the flonase works better by itself compared to astelin by itself. You can try doing the saline and flonase and if still congested, add on the astelin again doing this regimen daily for up to two weeks when congestion. There may be times of the year that you only need saline and there may be times of the year that you need saline, flonase and astelin to control symptoms.     Start using the following medication nasal sprays:   Fluticasone spray:    This medication is a steroid spray.  "It stays within the nose and does not have absorption into the body that leads to side effects that one has with oral steroid medication. Fluticasone nasal spray is the same as the Flonase brand nasal spray. Discuss with your pharmacist if the price is lower over the counter or with a prescription ( this varies depending on insurance). The medication that is over the counter is the same as the prescription medication. Use this medication as instructed on the prescription, 1-2 sprays on each side of your nose twice daily.     Azelastine  spray:  This medication is an antihistamine used to treat nasal symptoms of allergy, which works specifically in the nose unlike antihistamine pills which have more of an effect on the whole body. Use this medication as instructed on the prescription, 1 spray on each side of your nose twice daily.     Additional instructions for medication sprays  Place the tip of the medication bottle in your nose and aim slightly up and out on each side to get medication high and deep into your nose and sinuses, and not have it all deposit in the very front of your nose. Aim the tip of the nozzle towards the outer corner of your eye . You can imagine aiming towards the back of your eyeball on each side for this, as opposed to straight back to the center of your nose and head.     You need to use this medication every day regardless of symptoms, as it takes time ( a few weeks) to work and get the benefits. It does not work on an "as needed" basis like taking a decongestant. If your symptoms only occur in a particular season, then the medication can be used seasonally instead of year long. For seasonal symptoms, you should start using the spray twice daily a month before when you normally have symptoms ( for example, if symptoms start in August, should start at the end of June).     Start nasal irrigations with saline solution- you can either use a rinse or a mist spray:    NASAL SALINE SPRAY ( " simply saline and arm and hammer are examples) There are several different brands found in the cold and flu aisle of the pharmacy. You can use any brand of saline spray - this will deliver the saline by a gentle mist ( if you have difficulty or discomfort with nasal rinse/ a lot of fluid in the nose, this will be more comfortable).       Always rinse your nose with saline prior to using medication sprays and wait a couple of hours before using again. You can use the saline throughout the day to help with stuffy nose or dry nose.    Do not use nasal decongestant sprays such as Afrin or similar products long term ( over 3 days) .  This can cause long term physical nasal addiction. Afrin should only be used if having nose bleeds, severe nasal congestion , or severe ear pain/fullness and should not be used for more than 2-3 days in a row . It is a not a medication that should be used for a long period of time.          Megan Borden MD    [facility]     Phone  [phone]    Fax      [phone]        Megan Borden MD  Otorhinolaryngology

## 2025-02-10 ENCOUNTER — HOSPITAL ENCOUNTER (OUTPATIENT)
Dept: RADIOLOGY | Facility: HOSPITAL | Age: 50
Discharge: HOME | End: 2025-02-10
Attending: ORTHOPAEDIC SURGERY
Payer: OTHER GOVERNMENT

## 2025-02-10 ENCOUNTER — OFFICE VISIT (OUTPATIENT)
Dept: SPORTS MEDICINE | Facility: CLINIC | Age: 50
End: 2025-02-10
Payer: OTHER GOVERNMENT

## 2025-02-10 VITALS
DIASTOLIC BLOOD PRESSURE: 84 MMHG | HEIGHT: 71 IN | WEIGHT: 188.25 LBS | HEART RATE: 63 BPM | BODY MASS INDEX: 26.35 KG/M2 | SYSTOLIC BLOOD PRESSURE: 139 MMHG

## 2025-02-10 DIAGNOSIS — M23.92 INTERNAL DERANGEMENT OF LEFT KNEE: ICD-10-CM

## 2025-02-10 DIAGNOSIS — M25.562 LEFT KNEE PAIN, UNSPECIFIED CHRONICITY: Primary | ICD-10-CM

## 2025-02-10 DIAGNOSIS — S83.512S RUPTURE OF ANTERIOR CRUCIATE LIGAMENT OF LEFT KNEE, SEQUELA: ICD-10-CM

## 2025-02-10 DIAGNOSIS — M25.562 LEFT KNEE PAIN, UNSPECIFIED CHRONICITY: ICD-10-CM

## 2025-02-10 PROCEDURE — 99214 OFFICE O/P EST MOD 30 MIN: CPT | Mod: PBBFAC,25 | Performed by: ORTHOPAEDIC SURGERY

## 2025-02-10 PROCEDURE — 99999 PR PBB SHADOW E&M-EST. PATIENT-LVL IV: CPT | Mod: PBBFAC,,, | Performed by: ORTHOPAEDIC SURGERY

## 2025-02-10 PROCEDURE — 99214 OFFICE O/P EST MOD 30 MIN: CPT | Mod: S$PBB,,, | Performed by: ORTHOPAEDIC SURGERY

## 2025-02-10 PROCEDURE — 73564 X-RAY EXAM KNEE 4 OR MORE: CPT | Mod: TC,50

## 2025-02-10 PROCEDURE — 73564 X-RAY EXAM KNEE 4 OR MORE: CPT | Mod: 26,50,, | Performed by: RADIOLOGY

## 2025-02-10 NOTE — PROGRESS NOTES
"Subjective:     Chief Complaint: Ramon Carlos is a 49 y.o. male who had concerns including Pain of the Left Knee.    History of Present Illness    CHIEF COMPLAINT:  - Left knee function concerns and desire to return to running following surgery.    HPI:  Ramon presents for follow-up regarding his left knee condition. He desires to return to running, aiming to achieve his previous seven and a half minute mile pace, though he acknowledges this goal may be unrealistic currently. He reports left leg "gives out" episodes, particularly during simple walking activities, more frequently in the mornings. He has experienced falls due to this issue, albeit rarely.    He describes his knee as "boggy" with significant crepitus. Pain and shakiness occur when attempting to hold a 30-degree flexion at the hip and knee on the affected side. He has difficulty with deep squats, particularly in returning to a standing position from a deep squat.    He has been engaging in various exercises to improve single leg balance and strength, including using a Bosu Ball, performing weighted squats, agility ladder exercises, and single leg squats from a step. He uses a treadmill for exercise but previously engaged in biking and elliptical training. He wears a knee sleeve for support.    For treatment, he received stem therapy, though unsure of its proper application. He uses a polar care device for icing and compression post-exercise. He stopped meloxicam on January 1st due to concerns about long-term kidney effects, noting the knee remains swollen without the medication, with slightly more pain but still tolerable. He has completed physical therapy and plans to continue exercises independently.    Ramon denies any formal medical diagnoses.    PREVIOUS TREATMENTS:  - Ramon was doing physical therapy, including exercises with a Bosu Ball, weighted squats on the Bosu Ball, agility ladder exercises, single leg squats from a step, and " single leg get-ups.  - Ramon was previously biking 20-30 minutes 3-4 times per week, followed by elliptical for 20-30 minutes, which provided some benefit.  - Ramon wears a knee sleeve for support.  - Ramon was using a STEM (electrical stimulation) device, but it was not properly applied and likely not providing benefit.    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 Patient states that overall his pain is a 1/10, but he still is not able to run like he was prior to surgery. He states that he has not had swelling and effusion. He was taking mobic last year, and he discontinued it on January 1st. He has not been on any medications for pain since then. He still complains of of decreased sensation in the anterior aspect of the knee.               Past Surgical History:   Procedure Laterality Date    ADENOIDECTOMY      ARTHROSCOPIC CHONDROPLASTY OF KNEE JOINT Left 2/22/2024    Procedure: ARTHROSCOPY, KNEE, WITH CHONDROPLASTY;  Surgeon: Paolo Gibbons MD;  Location: [facility] OR;  Service: Orthopedics;  Laterality: Left;    ARTHROSCOPY,KNEE,WITH MENISCUS REPAIR Left 2/22/2024    Procedure: ARTHROSCOPY,KNEE,WITH MENISCUS REPAIR;  Surgeon: Paolo Gibbons MD;  Location: [facility] OR;  Service: Orthopedics;  Laterality: Left;    COLONOSCOPY N/A 7/10/2023    Procedure: COLONOSCOPY;  Surgeon: Laurent Terry MD;  Location: [facility] ENDO ([address] [place] [address]);  Service: Endoscopy;  Laterality: N/A;  Ref by Dr CHRISTAL Michel, Omar, instr via portal - PC\  7/5/23- Precall confirmed- [place]    EYE SURGERY      KNEE ARTHROSCOPY W/ ACL RECONSTRUCTION Left 2/22/2024    Procedure: RECONSTRUCTION, KNEE, ACL, ARTHROSCOPIC;   Surgeon: Paolo Gibbons MD;  Location: [facility] OR;  Service: Orthopedics;  Laterality: Left;  general, regional w/ catheter (adductor), pericapsular injection, ash 30cc    LYSIS, ADHESIONS, KNEE, ARTHROSCOPIC Left 2/22/2024    Procedure: LYSIS, ADHESIONS, KNEE, ARTHROSCOPIC;  Surgeon: Paolo Gibbons MD;  Location: [facility] OR;  Service: Orthopedics;  Laterality: Left;    RECONSTRUCTION OF LIGAMENT Left 2/22/2024    Procedure: RECONSTRUCTION, LIGAMENT, MCL;  Surgeon: Paolo Gibbons MD;  Location: [facility] OR;  Service: Orthopedics;  Laterality: Left;    TONSILLECTOMY         Objective:     General: Ramon is well-developed, well-nourished, appears stated age, in no acute distress, alert and oriented to time, place and person.     General    Vitals reviewed.  Constitutional: He is oriented to person, place, and time. He appears well-developed and well-nourished. No distress.   HENT: Mouth/Throat: No oropharyngeal exudate.   Eyes: Right eye exhibits no discharge. Left eye exhibits no discharge.   Pulmonary/Chest: Effort normal and breath sounds normal. No respiratory distress.   Neurological: He is alert and oriented to person, place, and time. He has normal reflexes. No cranial nerve deficit. Coordination normal.   Psychiatric: He has a normal mood and affect. His behavior is normal. Judgment and thought content normal.     General Musculoskeletal Exam   Gait: normal       Right Knee Exam     Inspection   Erythema: absent  Scars: absent  Swelling: absent  Effusion: absent  Deformity: absent  Bruising: absent    Tenderness   The patient is experiencing no tenderness.     Range of Motion   Extension:  0   Flexion:  150     Tests   Meniscus   Kim:  Medial - negative Lateral - negative  Ligament Examination   Lachman: normal (-1 to 2mm)   PCL-Posterior Drawer: normal (0 to 2mm)     MCL - Valgus: normal (0 to 2mm)  LCL - Varus: normal  Pivot Shift: normal (Equal)  Reverse Pivot Shift: normal (Equal)  Dial Test at 30  degrees: normal (< 5 degrees)  Dial Test at 90 degrees: normal (< 5 degrees)  Posterior Sag Test: negative  Posterolateral Corner: stable  Patella   Patellar apprehension: negative  Passive Patellar Tilt: neutral  Patellar Tracking: normal  Patellar Glide (quadrants): Lateral - 1   Medial - 2  Q-Angle at 90 degrees: normal  Patellar Grind: negative  J-Sign: none    Other   Meniscal Cyst: absent  Popliteal (Baker's) Cyst: absent  Sensation: normal    Left Knee Exam     Inspection   Erythema: absent  Scars: present  Swelling: absent  Effusion: absent  Deformity: absent  Bruising: absent    Tenderness   The patient is experiencing no tenderness.     Crepitus   The patient has crepitus of the patella.    Range of Motion   Extension:  5   Flexion:  140     Tests   Meniscus   Kim:  Medial - negative Lateral - negative  Stability   Lachman: normal (-1 to 2mm)   PCL-Posterior Drawer: normal (0 to 2mm)  MCL - Valgus: normal (0 to 2mm)  LCL - Varus: normal (0 to 2mm)  Pivot Shift: normal (Equal)  Reverse Pivot Shift: normal (Equal)  Dial Test at 30 degrees: normal (< 5 degrees)  Dial Test at 90 degrees: normal (< 5 degrees)  Posterior Sag Test: negative  Posterolateral Corner: stable  Patella   Patellar apprehension: negative  Passive Patellar Tilt: neutral  Patellar Tracking: normal  Patellar Glide (Quadrants): Lateral - 1 Medial - 2  Q-Angle at 90 degrees: normal  Patellar Grind: negative  J-Sign: J sign absent    Other   Meniscal Cyst: absent  Popliteal (Baker's) Cyst: absent  Sensation: normal    Right Hip Exam     Tests   Dexter: negative  Left Hip Exam     Tests   Dexter: negative  Step-down test: positive          Muscle Strength   Right Lower Extremity   Hip Abduction: 5/5   Quadriceps:  5/5   Hamstrin/5   Left Lower Extremity   Hip Abduction: 5/5   Quadriceps:  5/5   Hamstrin/5     Reflexes     Left Side  Achilles:  2+  Quadriceps:  2+    Right Side   Achilles:  2+  Quadriceps:  2+    Vascular Exam      Right Pulses  Dorsalis Pedis:      2+  Posterior Tibial:      2+        Left Pulses  Dorsalis Pedis:      2+  Posterior Tibial:      2+              Radiographic findings today:    Radiographs ordered and reviewed today in clinic of the bilateral knee.    X-ray Knee Ortho Bilateral with Flexion  Narrative: EXAMINATION:  XR KNEE ORTHO BILAT WITH FLEXION    CLINICAL HISTORY:  Pain in left knee    TECHNIQUE:  AP standing of both knees, PA flexion standing views of both knees, and Merchant views of both knees were performed.  Lateral views of both knees were also performed.    COMPARISON:  September 30, 2024    FINDINGS:  Left knee:    Reconfirmed and stable postsurgical changes related to ACL repair.  No acute fractures.  No definite narrowing of medial compartment.  Mild narrowing lateral compartment.  Mild narrowing lateral patellofemoral compartment and mild lateral patella tilt.  Reconfirmed minimal spurring about medial compartment and patellofemoral compartment.  Reconfirmed small suprapatellar bursal effusion.  No prepatellar soft tissue swelling.  Left knee findings do not appear significantly changed to earlier exam.    Right knee:    No acute fracture.  No definite narrowing of medial or lateral tibiofemoral compartments.  Mild narrowing of lateral patellofemoral compartment.  Reconfirmed minimal spurring about the patellofemoral compartment.  No suprapatellar bursal effusion or prepatellar soft tissue swelling.  Right knee findings do not appear significantly changed to earlier exam.  Impression: As above    Electronically signed by: Robin Mason  Date:    02/10/2025  Time:    08:59      These findings were discussed and reviewed with the patient.     Assessment:     Encounter Diagnoses   Name Primary?    Left knee pain, unspecified chronicity Yes    Internal derangement of left knee     Rupture of anterior cruciate ligament of left knee, sequela         Plan:     Assessment & Plan    FOLLOW UP:  -  Follow up in 6 months.    PATIENT INSTRUCTIONS:  - Ride stationary bike for 45-60 minutes, 3-4 times per week.  - Perform core work, including bridging, marching bridge, and regular bridge exercises.  - Strengthen hip abductors.  - Continue using Bosu ball for balance exercises.  - Use polar care device to compress and ice the lower extremity after exercise.  - Avoid running and high-impact activities.  - Consider swimming and water exercises as low-impact alternatives.           All of the patient's questions were answered and the patient will contact us if they have any questions or concerns in the interim.    This note was generated with the assistance of ambient listening technology. Verbal consent was obtained by the patient and accompanying visitor(s) for the recording of patient appointment to facilitate this note. I attest to having reviewed and edited the generated note for accuracy, though some syntax or spelling errors may persist. Please contact the author of this note for any clarification.

## 2025-05-23 ENCOUNTER — TELEPHONE (OUTPATIENT)
Dept: SPORTS MEDICINE | Facility: CLINIC | Age: 50
End: 2025-05-23
Payer: OTHER GOVERNMENT

## 2025-06-17 ENCOUNTER — PATIENT MESSAGE (OUTPATIENT)
Dept: FAMILY MEDICINE | Facility: CLINIC | Age: 50
End: 2025-06-17
Payer: OTHER GOVERNMENT

## 2025-06-17 DIAGNOSIS — Z11.1 TUBERCULOSIS SCREENING: ICD-10-CM

## 2025-06-17 DIAGNOSIS — Z92.89 HISTORY OF POSITIVE PPD: Primary | ICD-10-CM

## 2025-06-17 NOTE — TELEPHONE ENCOUNTER
Bandar CLAUDIO,     I need an updated TB test for work.  Can you place an order for a QuantiFERON Gold so that I can pass by a the [facility] and have it drawn?  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     Thanks,  Ramon

## 2025-06-19 ENCOUNTER — LAB VISIT (OUTPATIENT)
Dept: LAB | Facility: HOSPITAL | Age: 50
End: 2025-06-19
Attending: FAMILY MEDICINE
Payer: OTHER GOVERNMENT

## 2025-06-19 DIAGNOSIS — Z11.1 TUBERCULOSIS SCREENING: ICD-10-CM

## 2025-06-19 DIAGNOSIS — Z92.89 HISTORY OF POSITIVE PPD: ICD-10-CM

## 2025-06-19 PROCEDURE — 36415 COLL VENOUS BLD VENIPUNCTURE: CPT | Mod: PO

## 2025-06-19 PROCEDURE — 86480 TB TEST CELL IMMUN MEASURE: CPT

## 2025-06-20 LAB
MITOGEN MINUS NIL (OHS): 9.99
NIL TB SYNCED (OHS): 0.01
QUANTIFERON GOLD INTERP (OHS): NEGATIVE
TB1 AG MINUS NIL (OHS): 0.02
TB2 AG MINUS NIL (OHS): 0.03

## 2025-08-04 ENCOUNTER — OFFICE VISIT (OUTPATIENT)
Dept: SPORTS MEDICINE | Facility: CLINIC | Age: 50
End: 2025-08-04
Payer: OTHER GOVERNMENT

## 2025-08-04 VITALS
BODY MASS INDEX: 26.9 KG/M2 | HEART RATE: 58 BPM | WEIGHT: 192.88 LBS | SYSTOLIC BLOOD PRESSURE: 144 MMHG | DIASTOLIC BLOOD PRESSURE: 94 MMHG

## 2025-08-04 DIAGNOSIS — S83.512S RUPTURE OF ANTERIOR CRUCIATE LIGAMENT OF LEFT KNEE, SEQUELA: Primary | ICD-10-CM

## 2025-08-04 PROCEDURE — 99213 OFFICE O/P EST LOW 20 MIN: CPT | Mod: PBBFAC | Performed by: STUDENT IN AN ORGANIZED HEALTH CARE EDUCATION/TRAINING PROGRAM

## 2025-08-04 PROCEDURE — 99999 PR PBB SHADOW E&M-EST. PATIENT-LVL III: CPT | Mod: PBBFAC,,, | Performed by: STUDENT IN AN ORGANIZED HEALTH CARE EDUCATION/TRAINING PROGRAM

## 2025-08-04 PROCEDURE — 99214 OFFICE O/P EST MOD 30 MIN: CPT | Mod: S$PBB,,, | Performed by: STUDENT IN AN ORGANIZED HEALTH CARE EDUCATION/TRAINING PROGRAM

## 2025-08-04 NOTE — PROGRESS NOTES
Subjective:     Chief Complaint: Ramon Carlos is a 49 y.o. male who had concerns including Pain of the Left Knee and Follow-up.    HPI 08/04/2025   History of Present Illness    CHIEF COMPLAINT:  - Left knee follow-up s/p ACL and MCL surgery    HPI:  Client presents for follow-up approximately 1.5 years after undergoing ACL and MCL surgery on his left knee in February of last year. His knee has improved significantly since discontinuing PT, but he reports occasional instability, describing a sensation of the knee giving way slightly. He recalls falling while launching his boat during duck season, landing in the boat.    He experiences left knee stiffness during the first 10 to 12 steps when initiating walking, stating it feels unusual, but improves once he gets moving. Pain worsens later in the day with continued activity. He has not returned to running due to difficulty and discomfort, describing feeling uncoordinated when trying to run, noting that his quad strength is significantly diminished.    He wears a sleeve on the knee for compression during physical activities, which he finds helpful. He has a knee brace but does not use it anymore. He expresses interest in returning to the gym and inquires about the safety of performing exercises such as deadlifts.    He denies any formal medical diagnoses.    PREVIOUS TREATMENTS:  - PT following surgery, discontinued by patient       DATE OF PROCEDURE: 2/22/2024     ATTENDING SURGEON: Surgeon(s) and Role:     * Paolo Gibbons MD - Primary     PROCEDURES(S) PERFORMED: Complex  1. Left  Arthroscopy, anterior cruciate ligament reconstruction 69342  2.  Left  Ligament knee reconstruction, extra-articular 33862, MCL  3.  Left  Arthroscopy, with meniscus repair (medial AND lateral) 46150  4.  Left  Arthroscopy, debridement/shaving of articular cartilage (chondroplasty) 97127  5.  Left  Arthroscopy, with lysis of adhesions 56164    Pain Related Questions  Over  the past 3 days, what was your average pain during activity? (I.e. running, jogging, walking, climbing stairs, getting dressed, ect.): 1  Over the past 3 days, what was your highest pain level?: 2  Over the past 3 days, what was your lowest pain level? : 1    Other  How many nights a week are you awakened by your affected body part?: 0    Past Medical History[1]     Past Surgical History:   Procedure Laterality Date    ADENOIDECTOMY      ARTHROSCOPIC CHONDROPLASTY OF KNEE JOINT Left 2/22/2024    Procedure: ARTHROSCOPY, KNEE, WITH CHONDROPLASTY;  Surgeon: Paolo Gibbons MD;  Location: [facility] OR;  Service: Orthopedics;  Laterality: Left;    ARTHROSCOPY,KNEE,WITH MENISCUS REPAIR Left 2/22/2024    Procedure: ARTHROSCOPY,KNEE,WITH MENISCUS REPAIR;  Surgeon: Paolo Gibbons MD;  Location: [facility] OR;  Service: Orthopedics;  Laterality: Left;    COLONOSCOPY N/A 7/10/2023    Procedure: COLONOSCOPY;  Surgeon: Laurent Terry MD;  Location: [facility] ([address] [place] [address]);  Service: Endoscopy;  Laterality: N/A;  Ref by Dr CHRISTAL Michel, Andre, instr via portal - PC\  7/5/23- Precall confirmed- [place]    EYE SURGERY      KNEE ARTHROSCOPY W/ ACL RECONSTRUCTION Left 2/22/2024    Procedure: RECONSTRUCTION, KNEE, ACL, ARTHROSCOPIC;  Surgeon: Paolo Gibbons MD;  Location: [facility] OR;  Service: Orthopedics;  Laterality: Left;  general, regional w/ catheter (adductor), pericapsular injection, ash 30cc    LYSIS, ADHESIONS, KNEE, ARTHROSCOPIC Left 2/22/2024    Procedure: LYSIS, ADHESIONS, KNEE, ARTHROSCOPIC;  Surgeon: Paolo Gibbons MD;  Location: [facility] OR;  Service: Orthopedics;  Laterality: Left;    RECONSTRUCTION OF LIGAMENT Left 2/22/2024    Procedure: RECONSTRUCTION, LIGAMENT, MCL;  Surgeon: Paolo Gibbons MD;  Location: [facility] OR;  Service: Orthopedics;  Laterality: Left;    TONSILLECTOMY         Objective:     General: Ramon is well-developed, well-nourished, appears stated age, in no acute distress, alert and oriented to time, place and  person.     General    Vitals reviewed.  Constitutional: He is oriented to person, place, and time. He appears well-developed and well-nourished. No distress.   HENT: Mouth/Throat: No oropharyngeal exudate.   Eyes: Right eye exhibits no discharge. Left eye exhibits no discharge.   Pulmonary/Chest: Effort normal and breath sounds normal. No respiratory distress.   Neurological: He is alert and oriented to person, place, and time. He has normal reflexes. No cranial nerve deficit. Coordination normal.   Psychiatric: He has a normal mood and affect. His behavior is normal. Judgment and thought content normal.     General Musculoskeletal Exam   Gait: normal       Right Knee Exam     Inspection   Erythema: absent  Scars: absent  Swelling: absent  Effusion: absent  Deformity: absent  Bruising: absent    Tenderness   The patient is experiencing no tenderness.     Range of Motion   Extension:  0   Flexion:  140     Tests   Meniscus   Kim:  Medial - negative Lateral - negative  Ligament Examination   Lachman: normal (-1 to 2mm)   PCL-Posterior Drawer: normal (0 to 2mm)     MCL - Valgus: normal (0 to 2mm)  LCL - Varus: normal  Pivot Shift: normal (Equal)  Reverse Pivot Shift: normal (Equal)  Dial Test at 30 degrees: normal (< 5 degrees)  Dial Test at 90 degrees: normal (< 5 degrees)  Posterior Sag Test: negative  Posterolateral Corner: stable  Patella   Patellar apprehension: negative  Passive Patellar Tilt: neutral  Patellar Tracking: normal  Patellar Glide (quadrants): Lateral - 1   Medial - 2  Q-Angle at 90 degrees: normal  Patellar Grind: negative  J-Sign: none    Other   Meniscal Cyst: absent  Popliteal (Baker's) Cyst: absent  Sensation: normal    Left Knee Exam     Inspection   Erythema: absent  Scars: present  Swelling: present  Effusion: absent  Deformity: absent  Bruising: absent    Tenderness   The patient is experiencing no tenderness.     Range of Motion   Extension:  0   Flexion:  140     Tests   Meniscus    Kim:  Medial - negative Lateral - negative  Stability   Lachman: normal (-1 to 2mm)   PCL-Posterior Drawer: normal (0 to 2mm)  MCL - Valgus: normal (0 to 2mm)  LCL - Varus: normal (0 to 2mm)  Pivot Shift: normal (Equal)  Reverse Pivot Shift: normal (Equal)  Dial Test at 30 degrees: normal (< 5 degrees)  Dial Test at 90 degrees: normal (< 5 degrees)  Posterior Sag Test: negative  Posterolateral Corner: stable  Patella   Patellar apprehension: negative  Passive Patellar Tilt: neutral  Patellar Tracking: normal  Patellar Glide (Quadrants): Lateral - 1 Medial - 2  Q-Angle at 90 degrees: normal  Patellar Grind: negative  J-Sign: J sign absent    Other   Meniscal Cyst: absent  Popliteal (Baker's) Cyst: absent  Sensation: normal    Right Hip Exam     Tests   Dexter: negative  Left Hip Exam     Tests   Dexter: negative          Muscle Strength   Right Lower Extremity   Hip Abduction: 5/5   Quadriceps:  5/5   Hamstrin/5   Left Lower Extremity   Hip Abduction: 5/5   Quadriceps:  4/5   Hamstrin/5     Reflexes     Left Side  Achilles:  2+  Quadriceps:  2+    Right Side   Achilles:  2+  Quadriceps:  2+    Vascular Exam     Right Pulses  Dorsalis Pedis:      2+  Posterior Tibial:      2+        Left Pulses  Dorsalis Pedis:      2+  Posterior Tibial:      2+                Assessment:   Ramon Carlos is a 49 y.o. male status post above and doing well  Encounter Diagnosis   Name Primary?    Rupture of anterior cruciate ligament of left knee, sequela Yes        Plan:     Assessment & Plan    FOLLOW UP:  - Follow up in 6 months (2 years post-surgery).    PATIENT INSTRUCTIONS:  - Resume gym activities, including deadlifts, French deadlifts, straight leg deadlifts, hamstring and quadriceps exercises.  - Started with low weights and gradually increase as tolerated.  - Begin jogging as comfort allows.  - Use knee sleeve during physical activities.           All of the patient's questions were answered and the  patient will contact us if they have any questions or concerns in the interim.    This note was generated with the assistance of ambient listening technology. Verbal consent was obtained by the patient and accompanying visitor(s) for the recording of patient appointment to facilitate this note. I attest to having reviewed and edited the generated note for accuracy, though some syntax or spelling errors may persist. Please contact the author of this note for any clarification.           [1]   Past Medical History:  Diagnosis Date    Allergic rhinitis, seasonal     Calcaneus fracture     Family history of colon cancer     High-density lipoprotein deficiency     Incomplete right bundle branch block     Meningitis     Rotator cuff injury     Vitamin D deficiency disease

## (undated) DEVICE — GLOVE BIOGEL SKINSENSE PI 8.0

## (undated) DEVICE — WRAP KNEE ACCU THERM GEL PACK

## (undated) DEVICE — SUT MCRYL PLUS 4-0 PS2 27IN

## (undated) DEVICE — DRAPE STERI U-SHAPED 47X51IN

## (undated) DEVICE — SOL NACL IRR 3000ML

## (undated) DEVICE — GLOVE SENSICARE PI SURG 8.5

## (undated) DEVICE — FULLY FLUTED REAMER

## (undated) DEVICE — BOWL STERILE LARGE 32OZ

## (undated) DEVICE — SHAVER ULTRAFFR 4.2MM

## (undated) DEVICE — BLADE SAGITTA 5/BX

## (undated) DEVICE — UNDERGLOVES BIOGEL PI SZ 7 LF

## (undated) DEVICE — MARKER SKIN RULER STERILE

## (undated) DEVICE — GOWN ECLIPSE REINF L4 XLNG XXL

## (undated) DEVICE — Device

## (undated) DEVICE — ADHESIVE DERMABOND ADVANCED

## (undated) DEVICE — BIT DRILL SENTINEL 11MM X 9

## (undated) DEVICE — BLADE SURG #15 CARBON STEEL

## (undated) DEVICE — ORTHOCORD W/OS-6

## (undated) DEVICE — DRAPE ARTHSCP T ORTHOMAX POUCH

## (undated) DEVICE — APPLICATOR CHLORAPREP ORN 26ML

## (undated) DEVICE — SUT MONOCRYL 3-0 PS-2 UND

## (undated) DEVICE — PAD ABDOMINAL STERILE 8X10IN

## (undated) DEVICE — YANKAUER OPEN TIP W/O VENT

## (undated) DEVICE — GOWN SMARTSLEEVE XXL/XLONG

## (undated) DEVICE — SUT ETHILON 4-0 BLK MONO

## (undated) DEVICE — SHAVER SYS 5.5 ULRAFRR

## (undated) DEVICE — GLOVE BIOGEL SKINSENSE PI 7.0

## (undated) DEVICE — PAD CAST SPECIALIST STRL 6

## (undated) DEVICE — GAUZE SPONGE 4X4 12PLY

## (undated) DEVICE — SUT PDS VIL/BLU DUAL ORTHO

## (undated) DEVICE — DRESSING AQUACEL ADH 4X10IN

## (undated) DEVICE — COVER TABLE HVY DTY 60X90IN

## (undated) DEVICE — COVER CAMERA OPERATING ROOM

## (undated) DEVICE — BLADE ACL FINE 9.5X25.5X.4MM

## (undated) DEVICE — GOWN ECLIPSE REINF LV4 XLNG XL

## (undated) DEVICE — SOL NACL STRL BOTTLE 1000ML

## (undated) DEVICE — SYR 30CC LUER LOCK

## (undated) DEVICE — FREE NEEDLE

## (undated) DEVICE — NDL 22GA X1 1/2 REG BEVEL

## (undated) DEVICE — BNDG COFLEX FOAM LF2 ST 6X5YD

## (undated) DEVICE — DRAPE THREE-QTR REINF 53X77IN

## (undated) DEVICE — ELECTRODE REM PLYHSV RETURN 9

## (undated) DEVICE — KIT ACL DISPOSABLE

## (undated) DEVICE — COVER LIGHT HANDLE 80/CA

## (undated) DEVICE — TOWEL OR DISP STRL BLUE 4/PK

## (undated) DEVICE — SEE MEDLINE ITEM 159592

## (undated) DEVICE — XACTPIN GRAFT PASSING GUIDE PIN, STERILE, 2.4 X 432 MM (.093 X 17 IN.)
Type: IMPLANTABLE DEVICE | Site: KNEE | Status: NON-FUNCTIONAL
Brand: XACTPIN
Removed: 2024-02-22

## (undated) DEVICE — DRESSING XEROFORM NONADH 1X8IN

## (undated) DEVICE — ELECTRODE 90 DEGREE ANGLE

## (undated) DEVICE — DRAPE TOP 53X102IN

## (undated) DEVICE — DRAPE STERI INSTRUMENT 1018

## (undated) DEVICE — SUT VICRYL PLUS 3-0 SH 18IN

## (undated) DEVICE — CORING REAMER, 10MM WITH COLLARED PIN
Type: IMPLANTABLE DEVICE | Site: KNEE | Status: NON-FUNCTIONAL
Brand: ARTHREX®
Removed: 2024-02-22

## (undated) DEVICE — SUT VICRYL+ 1 CT1 18IN

## (undated) DEVICE — PAD ELECTRODE STER 1.5X3

## (undated) DEVICE — KIT TOTAL KNEE TKOFG OMC

## (undated) DEVICE — BRACE KNEE T SCOPE PREMIER

## (undated) DEVICE — SUT 4-0 ETHILON 18 PS-2

## (undated) DEVICE — GLOVE SENSICARE PI GRN 9

## (undated) DEVICE — TUBE SET INFLOW/OUTFLOW

## (undated) DEVICE — TOURNIQUET SB QC DP 34X4IN

## (undated) DEVICE — CONTAINER SPECIMEN OR STER 4OZ